# Patient Record
Sex: FEMALE | Race: WHITE | Employment: OTHER | URBAN - METROPOLITAN AREA
[De-identification: names, ages, dates, MRNs, and addresses within clinical notes are randomized per-mention and may not be internally consistent; named-entity substitution may affect disease eponyms.]

---

## 2019-09-18 ENCOUNTER — HOSPITAL ENCOUNTER (OUTPATIENT)
Dept: MAMMOGRAPHY | Age: 64
Discharge: HOME | End: 2019-09-18
Attending: NURSE PRACTITIONER
Payer: MEDICARE

## 2019-09-18 DIAGNOSIS — Z12.31 VISIT FOR SCREENING MAMMOGRAM: ICD-10-CM

## 2019-09-18 PROCEDURE — 77067 SCR MAMMO BI INCL CAD: CPT

## 2020-12-11 LAB — MAMMOGRAPHY, EXTERNAL: NORMAL

## 2021-02-18 ENCOUNTER — HOSPITAL ENCOUNTER (OUTPATIENT)
Dept: GENERAL RADIOLOGY | Age: 66
Discharge: HOME | End: 2021-02-18
Payer: MEDICARE

## 2021-02-18 DIAGNOSIS — J44.9 CHRONIC BRONCHITIS, OBSTRUCTIVE (HCC): ICD-10-CM

## 2021-02-18 PROCEDURE — 71046 X-RAY EXAM CHEST 2 VIEWS: CPT

## 2022-07-11 ENCOUNTER — OFFICE VISIT (OUTPATIENT)
Dept: ORTHOPEDIC SURGERY | Age: 67
End: 2022-07-11
Payer: MEDICARE

## 2022-07-11 VITALS — BODY MASS INDEX: 41.59 KG/M2 | HEIGHT: 62 IN | WEIGHT: 226 LBS

## 2022-07-11 DIAGNOSIS — M25.561 ACUTE PAIN OF BOTH KNEES: Primary | ICD-10-CM

## 2022-07-11 DIAGNOSIS — M17.12 PRIMARY OSTEOARTHRITIS OF LEFT KNEE: ICD-10-CM

## 2022-07-11 DIAGNOSIS — M25.562 ACUTE PAIN OF BOTH KNEES: Primary | ICD-10-CM

## 2022-07-11 DIAGNOSIS — M25.561 ACUTE PAIN OF RIGHT KNEE: ICD-10-CM

## 2022-07-11 DIAGNOSIS — M25.562 ACUTE PAIN OF LEFT KNEE: ICD-10-CM

## 2022-07-11 DIAGNOSIS — M17.11 PRIMARY OSTEOARTHRITIS OF RIGHT KNEE: ICD-10-CM

## 2022-07-11 PROCEDURE — 20610 DRAIN/INJ JOINT/BURSA W/O US: CPT | Performed by: ORTHOPAEDIC SURGERY

## 2022-07-11 PROCEDURE — 1123F ACP DISCUSS/DSCN MKR DOCD: CPT | Performed by: ORTHOPAEDIC SURGERY

## 2022-07-11 PROCEDURE — 99205 OFFICE O/P NEW HI 60 MIN: CPT | Performed by: ORTHOPAEDIC SURGERY

## 2022-07-11 PROCEDURE — G8399 PT W/DXA RESULTS DOCUMENT: HCPCS | Performed by: ORTHOPAEDIC SURGERY

## 2022-07-11 PROCEDURE — 1090F PRES/ABSN URINE INCON ASSESS: CPT | Performed by: ORTHOPAEDIC SURGERY

## 2022-07-11 PROCEDURE — G8427 DOCREV CUR MEDS BY ELIG CLIN: HCPCS | Performed by: ORTHOPAEDIC SURGERY

## 2022-07-11 PROCEDURE — G8417 CALC BMI ABV UP PARAM F/U: HCPCS | Performed by: ORTHOPAEDIC SURGERY

## 2022-07-11 PROCEDURE — 1036F TOBACCO NON-USER: CPT | Performed by: ORTHOPAEDIC SURGERY

## 2022-07-11 PROCEDURE — 3017F COLORECTAL CA SCREEN DOC REV: CPT | Performed by: ORTHOPAEDIC SURGERY

## 2022-07-11 RX ORDER — METHYLPREDNISOLONE ACETATE 40 MG/ML
40 INJECTION, SUSPENSION INTRA-ARTICULAR; INTRALESIONAL; INTRAMUSCULAR; SOFT TISSUE ONCE
Status: COMPLETED | OUTPATIENT
Start: 2022-07-11 | End: 2022-07-11

## 2022-07-11 RX ADMIN — METHYLPREDNISOLONE ACETATE 40 MG: 40 INJECTION, SUSPENSION INTRA-ARTICULAR; INTRALESIONAL; INTRAMUSCULAR; SOFT TISSUE at 09:13

## 2022-07-11 RX ADMIN — METHYLPREDNISOLONE ACETATE 40 MG: 40 INJECTION, SUSPENSION INTRA-ARTICULAR; INTRALESIONAL; INTRAMUSCULAR; SOFT TISSUE at 09:12

## 2022-07-11 NOTE — PROGRESS NOTES
Name: Landen Contreras  YOB: 1955  Gender: female  MRN: [id number]    CC: Bilateral knee pain    HPI: Landen Contreras is a 69 y.o. female who presents with longstanding bilateral knee pain left now worse than right. She states that many years ago she underwent arthroscopy of the right knee under my care and was treated with bracing which did seem to help that knee. She is been ambulating with a cane for greater than 1 year because of pain and insecurity on the left side. She has difficulty walking more than a few blocks without support. She avoids steps and has difficulty getting up and down from a seated position. She has difficulty with uneven ground. She is growing more frustrated with her increased pain on the left side and that she comes in there for further recommendations and treatment. Does have a history of a left hip fracture treated at [facility] with what sounds like a short IMH S type construct. History was obtained from patient and she has a friend with her who has undergone bilateral knee replacement surgery at a single sitting by Dr. Anthoney Leyden with the right complicated by postoperative infection requiring staged revision surgery ultimately by Dr. Demetrice Penn in [facility]. ROS/Meds/PSH/PMH/FH/SH: I personally reviewed the patients standard intake form. Below are the pertinents    Tobacco:  reports that she has never smoked.  She has never used smokeless tobacco.  Past Medical History:   Diagnosis Date    Allergic rhinitis     Anxiety     Arthritis     Chronic pain syndrome     Depression     Ear problems     Facet arthritis of lumbar region     GERD (gastroesophageal reflux disease)     Hearing reduced     Hyperlipemia     Hypertension     Menopausal disorder     Metabolic syndrome     Osteoporosis     Thickened endometrium       Past Surgical History:   Procedure Laterality Date    CARPAL TUNNEL RELEASE  03/09/2010    CHOLECYSTECTOMY      ORTHOPEDIC SURGERY Left 03/13/2014    hip fracture and sugery- fell at work    OTHER SURGICAL HISTORY  2001    sphincterotomy- Dr. Buckley Brunner        Physical Examination:  Physical exam: On examination of the patient's gait there is there is varus deformity in the bilateral knee she uses a cane properly in the right hand    On examination while standing there is decreased flexion in the lumbosacral spine without acute list or spasm. While seated ,  the Bilateral hip is examined there is full range of motion without obvious issue . On examination of the  Left knee :ROM is 0 to 125 The knee is in varus which corrects with valgus stress to some degree, There is significant tenderness to direct palpation and There is a mild effusion. On examination of the  Right knee :ROM is 0 to 125 The knee is in varus which corrects with valgus stress to some degree, There is significant tenderness to direct palpation and There is no obvious effusion. Patient is neurologically intact distally. Skin is intact. Imaging:   Radiographs: An AP standing, skiers, flexion lateral, and sunrise view of the bilateral knees were obtained  This demonstrated  Severe joint space narrowing of the medial compartment with bone-on-bone articulation and Marked osteophyte formation in all 3 compartments. Radiographic impression: severe DJD bilateral Knee    Assessment:   Degenerative joint disease of the bilateral Knee. Left more symptomatic than right with a previous left hip fracture and some history of back pain. I did discuss with the patient the source of the pain and treatment options. We discussed nonsurgical measures including:Injection therapy, Bracing, Weight Loss, Activity Modification and Use of assistive device. We also discussed the definitive option of total Knee arthroplasty. I counseled the patient regarding the nature of the procedure the preoperative preparation necessary postop recovery and expected outcome.     I counseled them regarding the risks of surgery including risk of infection, DVT formation, loss of motion, dislocation and stiffness. This patient has significant factors which may increase their surgical risk which include:, Morbid obesity with a BMI in excess of 40. This increases the risk for medical complications perioperatively including pulmonary issues, cardiovascular issues, and surgical complications including wound healing problems and increased risk for infection. and History of significant lumbar spinal issues which may complicate recovery and increased risk for persistent postoperative pain. We discussed time return to work , general activity and long-term expectations. We discussed in detail given the patient's friend's experience the complication of infection postoperatively in detail. While this is certainly always a possibility, I do not think this patient represents particular high risk for infection postoperatively. We talked about our perioperative protocols and measures that we use to prevent perioperative infection within our best abilities. I described the [address] program for the patient and expected time for hospitalization. This patient, with their level of home support, medical comorbidities, and general ambulatory function expected to have an overnight stay in the hospital prior to discharge. I would plan a Cemented Fixed Bearing Depuy Attune TKA. I would address the left knee first and the right knee at a later date per protocol. I would use Conventional Instrumentation  I discussed my implant selection process and rationale with the patient. Patient would like to consider options, if they would like to proceed with surgery they will let us know. If so, it will be a category 2 in technical complexity. This reflects my expectation for surgical time and difficulty but does not indicate the overall complexity of the patient's care.     Plan:       Follow up: Return for Surgery.      Jayden Emanuel MD      Name: Toya Moulton  YOB: 1955  Gender: female  MRN: [id number]        Current Outpatient Medications:     albuterol sulfate  (90 Base) MCG/ACT inhaler, Inhale 1 puff into the lungs every 4 hours as needed, Disp: , Rfl:     amLODIPine (NORVASC) 10 MG tablet, Take 10 mg by mouth daily, Disp: , Rfl:     aspirin 81 MG EC tablet, Take 81 mg by mouth daily, Disp: , Rfl:     carvedilol (COREG) 6.25 MG tablet, Take 6.25 mg by mouth 2 times daily (with meals), Disp: , Rfl:     Diclofenac Sodium 2 % SOLN, Apply 4 Act topically 2 times daily, Disp: , Rfl:     DULoxetine (CYMBALTA) 60 MG extended release capsule, Take 60 mg by mouth 2 times daily, Disp: , Rfl:     fenofibrate micronized (LOFIBRA) 200 MG capsule, Take 200 mg by mouth, Disp: , Rfl:     fluticasone (FLONASE) 50 MCG/ACT nasal spray, 1 to 2 sprays each nostril daily, Disp: , Rfl:     gabapentin (NEURONTIN) 100 MG capsule, Take 200 mg by mouth., Disp: , Rfl:     hydroCHLOROthiazide (HYDRODIURIL) 25 MG tablet, Take 25 mg by mouth daily, Disp: , Rfl:     Magnesium Oxide 500 MG TABS, Take by mouth, Disp: , Rfl:     meloxicam (MOBIC) 7.5 MG tablet, Take 7.5 mg by mouth daily, Disp: , Rfl:     naloxone 4 MG/0.1ML LIQD nasal spray, 1 spray by Nasal route once, Disp: , Rfl:     potassium chloride (KLOR-CON) 10 MEQ extended release tablet, Take 10 mEq by mouth 2 times daily, Disp: , Rfl:     QUEtiapine (SEROQUEL) 25 MG tablet, Take 25 mg by mouth, Disp: , Rfl:     rosuvastatin (CRESTOR) 20 MG tablet, Take 20 mg by mouth, Disp: , Rfl:     valsartan (DIOVAN) 320 MG tablet, Take 320 mg by mouth daily, Disp: , Rfl:   Allergies   Allergen Reactions    Benazepril Other (See Comments)    Erythromycin Other (See Comments)     Other reaction(s): Abdominal pain-I  Other reaction(s): Abdominal pain-I  GI upset       CC:bilateral knee pain    Impression: Osteoarthritis of the bilateral knee    Procedure: Depomedrol injection     Procedure Note: The bilateral knee was prepped with alcohol. Then the bilateral knee was injected with 1 mL of 0.5% Marcaine and 40mg of Depo-Medrol. The patient tolerated the procedure without difficulty.       Frank Duval MD

## 2022-07-28 DIAGNOSIS — M17.12 PRIMARY OSTEOARTHRITIS OF LEFT KNEE: Primary | ICD-10-CM

## 2022-08-23 ENCOUNTER — HOSPITAL ENCOUNTER (OUTPATIENT)
Dept: REHABILITATION | Age: 67
Discharge: HOME | End: 2022-08-26
Payer: MEDICARE

## 2022-08-23 ENCOUNTER — HOSPITAL ENCOUNTER (OUTPATIENT)
Dept: SURGERY | Age: 67
Discharge: HOME | End: 2022-08-26
Payer: MEDICARE

## 2022-08-23 DIAGNOSIS — Z96.652 STATUS POST LEFT KNEE REPLACEMENT: Primary | ICD-10-CM

## 2022-08-23 DIAGNOSIS — R29.818 SUSPECTED SLEEP APNEA: ICD-10-CM

## 2022-08-23 LAB
ANION GAP SERPL CALC-SCNC: 4 MMOL/L (ref 7–16)
APTT PPP: 26.7 SEC (ref 24.1–35.1)
BACTERIA SPEC CULT: NORMAL
BASOPHILS # BLD: 0 K/UL (ref 0–0.2)
BASOPHILS NFR BLD: 0 % (ref 0–2)
BUN SERPL-MCNC: 15 MG/DL (ref 8–23)
CALCIUM SERPL-MCNC: 9.2 MG/DL (ref 8.3–10.4)
CHLORIDE SERPL-SCNC: 106 MMOL/L (ref 98–107)
CO2 SERPL-SCNC: 29 MMOL/L (ref 21–32)
CREAT SERPL-MCNC: 0.87 MG/DL (ref 0.6–1)
DIFFERENTIAL METHOD BLD: ABNORMAL
EKG ATRIAL RATE: 79 BPM
EKG DIAGNOSIS: NORMAL
EKG P AXIS: 52 DEGREES
EKG P-R INTERVAL: 166 MS
EKG Q-T INTERVAL: 388 MS
EKG QRS DURATION: 116 MS
EKG QTC CALCULATION (BAZETT): 444 MS
EKG R AXIS: 76 DEGREES
EKG T AXIS: -24 DEGREES
EKG VENTRICULAR RATE: 79 BPM
EOSINOPHIL # BLD: 0.5 K/UL (ref 0–0.8)
EOSINOPHIL NFR BLD: 5 % (ref 0.5–7.8)
ERYTHROCYTE [DISTWIDTH] IN BLOOD BY AUTOMATED COUNT: 14.5 % (ref 11.9–14.6)
GLUCOSE SERPL-MCNC: 171 MG/DL (ref 65–100)
HCT VFR BLD AUTO: 41.8 % (ref 35.8–46.3)
HGB BLD-MCNC: 13.5 G/DL (ref 11.7–15.4)
IMM GRANULOCYTES # BLD AUTO: 0 K/UL (ref 0–0.5)
IMM GRANULOCYTES NFR BLD AUTO: 0 % (ref 0–5)
INR PPP: 0.9
LYMPHOCYTES # BLD: 1 K/UL (ref 0.5–4.6)
LYMPHOCYTES NFR BLD: 11 % (ref 13–44)
MCH RBC QN AUTO: 29.4 PG (ref 26.1–32.9)
MCHC RBC AUTO-ENTMCNC: 32.3 G/DL (ref 31.4–35)
MCV RBC AUTO: 91.1 FL (ref 79.6–97.8)
MONOCYTES # BLD: 0.9 K/UL (ref 0.1–1.3)
MONOCYTES NFR BLD: 10 % (ref 4–12)
NEUTS SEG # BLD: 6.6 K/UL (ref 1.7–8.2)
NEUTS SEG NFR BLD: 73 % (ref 43–78)
NRBC # BLD: 0 K/UL (ref 0–0.2)
PLATELET # BLD AUTO: 312 K/UL (ref 150–450)
PMV BLD AUTO: 10.5 FL (ref 9.4–12.3)
POTASSIUM SERPL-SCNC: 3.2 MMOL/L (ref 3.5–5.1)
PROTHROMBIN TIME: 12.3 SEC (ref 12.6–14.5)
RBC # BLD AUTO: 4.59 M/UL (ref 4.05–5.2)
SERVICE CMNT-IMP: NORMAL
SODIUM SERPL-SCNC: 139 MMOL/L (ref 136–145)
WBC # BLD AUTO: 9 K/UL (ref 4.3–11.1)

## 2022-08-23 PROCEDURE — 85730 THROMBOPLASTIN TIME PARTIAL: CPT

## 2022-08-23 PROCEDURE — 87641 MR-STAPH DNA AMP PROBE: CPT

## 2022-08-23 PROCEDURE — 80048 BASIC METABOLIC PNL TOTAL CA: CPT

## 2022-08-23 PROCEDURE — 85610 PROTHROMBIN TIME: CPT

## 2022-08-23 PROCEDURE — 85025 COMPLETE CBC W/AUTO DIFF WBC: CPT

## 2022-08-23 PROCEDURE — 93005 ELECTROCARDIOGRAM TRACING: CPT | Performed by: ANESTHESIOLOGY

## 2022-08-23 PROCEDURE — 97161 PT EVAL LOW COMPLEX 20 MIN: CPT

## 2022-08-23 RX ORDER — LORAZEPAM 1 MG/1
1 TABLET ORAL 2 TIMES DAILY PRN
COMMUNITY

## 2022-08-23 RX ORDER — TRAMADOL HYDROCHLORIDE 50 MG/1
50 TABLET ORAL EVERY 6 HOURS PRN
Status: ON HOLD | COMMUNITY
End: 2022-09-07 | Stop reason: HOSPADM

## 2022-08-23 ASSESSMENT — KOOS JR
RISING FROM SITTING: 1
KOOS JR TOTAL INTERVAL SCORE: 54.84
STANDING UPRIGHT: 1
TWISING OR PIVOTING ON KNEE: 1
BENDING TO THE FLOOR TO PICK UP OBJECT: 2
GOING UP OR DOWN STAIRS: 3
STRAIGHTENING KNEE FULLY: 2
HOW SEVERE IS YOUR KNEE STIFFNESS AFTER FIRST WAKING IN MORNING: 3

## 2022-08-23 ASSESSMENT — PAIN SCALES - GENERAL: PAINLEVEL_OUTOF10: 6

## 2022-08-23 NOTE — PERIOP NOTE
Patient verified name and . Order for consent NOT found in EHR; patient verified. Type 3 surgery, joint assessment complete. Labs per surgeon: No orders received. Labs per anesthesia protocol: cbc, bmp, pt/ptt, hgba1c; results pending. T&S DOS; order signed and held in EHR. EKG: Completed today; results to be reviewed by anesthesia. Charge nurse to f/u. Pt was seen by pulmonary on 21 with c/o SOB. Per note pt has restrictive lung disease and a 6 month f/u was recommended. Pt never followed up. Anesthesia to review the above-charge nurse to f/u. MRSA/MSSA swab collected; pharmacy to review and dose antibiotic as appropriate. Hospital approved surgical skin cleanser and instructions to return bottle on DOS given per hospital policy. Patient provided with handouts including Guide to Surgery, Pain Management, Hand Hygiene, Blood Transfusion Education, and [facility] Brochure. Patient answered medical/surgical history questions at their best of ability. All prior to admission medications documented in [facility]. Original medication prescription bottle NOT visualized during patient appointment. Patient instructed to hold all vitamins, supplements, herbals 3 weeks prior to surgery and NSAIDS 5 days prior to surgery. Patient teach back successful and patient demonstrates knowledge of instruction.

## 2022-08-23 NOTE — PROGRESS NOTES
Waldo Goodrich  : 1955  Primary: [facility]  Secondary:  [facility] at [facility]  [address]  [phone]        Physical Therapy Prehab Evaluation Summary:2022   Time In/Out   PT Charge Capture  Episode     MEDICAL/REFERRING DIAGNOSIS: Unilateral primary osteoarthritis, left knee [M17.12]  REFERRING PHYSICIAN: Sophie Maloney MD    ICD-10: Treatment Diagnosis:   Pain in Left Knee (M25.562)  Stiffness of Left Knee, Not elsewhere classified (M25.662)  Difficulty in walking, Not elsewhere classified (R26.2)    DATE OF SURGERY: 22  Assessment:   COMMENTS:  Patient presents prior to L TKA. She needs a R TKA as well. Patient plans on spending the night. She will go home with help from her sister. PROBLEM LIST:   (Impacting functional limitations):  Ms. Alvin Campa presents with the following lower extremity(s) problems:  Transfers  Gait  Strength  Range of Motion  Home Exercise Program  Pain INTERVENTIONS PLANNED:   (Benefits and precautions of physical therapy have been discussed with the patient.)  Home Exercise Program  Educational Discussion       GOALS: (Goals have been discussed and agreed upon with patient.)  Discharge Goals: Time Frame: 1 Day  Patient will demonstrate independence with a home exercise program designed to increase strength, range of motion, balance, coordination, functional technique, and pain control to minimize functional deficits and optimize patient for total joint replacement.     Subjective:   Past Medical History/Comorbidities:   Ms. Alvin Campa  has a past medical history of Allergic rhinitis, Anxiety, Arthritis, Autoimmune hepatitis ([address] Chronic pain syndrome, Depression, Ear problems, Elevated liver enzymes, Facet arthritis of lumbar region, GERD (gastroesophageal reflux disease), Hearing reduced, Hyperlipemia, Hypertension, Iron deficiency anemia, Menopausal disorder, Metabolic syndrome, Osteoporosis, Restrictive lung disease, and Thickened endometrium. Ms. Kimberley Garrett  has a past surgical history that includes orthopedic surgery (Left, 03/13/2014); Cholecystectomy; other surgical history (2001); and ERCP (05/2021).     Allergies:  Benazepril and Erythromycin    Current Medications:  Refer to pre-assessment nursing note    Home Set-Up/Prior Level of Function:  Lives With: Family  Type of Home: House  Home Layout: One level  Home Access: Stairs to enter with rails  Entrance Stairs - Number of Steps: 1  Entrance Stairs - Rails: Left  Bathroom Shower/Tub: Walk-in shower  Bathroom Equipment: Grab bars in shower, Built-in shower seat  Home Equipment: Cane  Ambulation Assistance: Independent  Occupation: Retired    Dominant Side:  No data recorded    Current Functional Status:   Ambulation:  [] Independent  [] [address] Only  [] Walk Outdoors  [x] Use Assistive Device  [] Use Wheelchair Only Dressing:  [x] [email or web address] from Someone for:  [] Sock/Shoes  [] Pants  [] Everything   Bathing/Showering:   [x] Independent  [] [address] from Someone  [] [address] Only Household Activities:  [x] Routine house and yard work  [] Light Housework Only  [] None     Objective:   PAIN:   Pre-Treatment Pain  Pain Assessment: 0-10  Pain Level: 6    Post Treatment: 6    Outcome Measure:   HOOS-JR:       KOOS-JR:  KOOS, Jr. Knee Survey Score: 13    LOWER EXTREMITY GROSS EVALUATION:  RIGHT LE   Within Functional Limits   Abnormal   Comments   Strength [] [] Strength RLE  R Hip Flexion: 4+/5  R Knee Flexion: 4+/5  R Knee Extension: 4+/5   Range of Motion [] []        LEFT LE Within Functional Limits   Abnormal   Comments   Strength [] [] Strength LLE  L Hip Flexion: 4+/5  L Knee Flexion: 4+/5  L Knee Extension: 4+/5   Range of Motion [] [] AROM LLE (degrees)  L Knee Flexion 0-145: 109  L Knee Extension 0: 0     UPPER EXTREMITY GROSS EVALUATION:     Within Functional Limits   Abnormal   Comments Strength [x] []    Range of Motion [x] []      SENSATION  Sensation []       COGNITION/  PERCEPTION: Intact Impaired (Comments):   Orientation [x]     Vision [x]     Hearing [x]     Cognition  [x]       TRANSFERS: I Mod I S SBA CGA Min Mod Max Total  NT x2 Comments:   Sit to Stand [x] [] [] [] [] [] [] [] [] [] []    Stand to Sit [x] [] [] [] [] [] [] [] [] [] []    Stand pivot [] [] [] [] [] [] [] [] [] [] []     [] [] [] [] [] [] [] [] [] [] []    I=Independent, Mod I=Modified Independent, S=Supervision, SBA=Standby Assistance, CGA=Contact Guard Assistance,   Min=Minimal Assistance, Mod=Moderate Assistance, Max=Maximal Assistance, Total=Total Assistance, NT=Not Tested    BALANCE: Good Fair+ Fair Fair- Poor NT Comments   Sitting Static [x] [] [] [] [] []    Sitting Dynamic [x] [] [] [] [] []              Standing Static [x] [] [] [] [] []    Standing Dynamic [] [x] [] [] [] []      Postural Assessment:   Genu Varus Right  Genu Varus Left    GAIT: I Mod I S SBA CGA Min Mod Max Total  NT x2 Comments:   Level of Assistance [] [x] [] [] [] [] [] [] [] [] []    Weightbearing Status       Distance  200 feet    Gait Quality Antalgic and Trunk sway increased    DME SPC     Stairs      Ramp     I=Independent, Mod I=Modified Independent, S=Supervision, SBA=Standby Assistance, CGA=Contact Guard Assistance,   Min=Minimal Assistance, Mod=Moderate Assistance, Max=Maximal Assistance, Total=Total Assistance, NT=Not Tested    TREATMENT:   EVALUATION: LOW COMPLEXITY: (Untimed Charge)    TREATMENT PLAN:   Effective Dates: 8/23/2022 TO 8/23/2022. Treatment/Session Assessment:  Patient was instructed in PT- HEP to increase strength and ROM in LEs. Answered all questions. Frequency/Duration: Patient to continue to perform home exercise program at least twice per day up until her surgery.     EDUCATION: Education Given To: Patient  Education Provided: Role of Therapy, Plan of Care, Home Exercise Program  Education Outcome: Verbalized understanding    MEDICAL NECESSITY: Ms. Fawad Shen is expected to optimize herlower extremity strength and ROM in preparation for joint replacement surgery. REASON FOR CONTINUED SERVICES: Achieve baseline assesment of musculoskeletal system, functional mobility and home environment. , educate in PT HEP in preparation for surgery, educate in hospital plan of care. COMPLIANCE WITH PROGRAM/EXERCISE: Will assess as treatment progresses. TOTAL TREATMENT DURATION:  Time In: 1015  Time Out: 56  Minutes: 15    Regarding Anjana Luke's therapy, I certify that the treatment plan above will be carried out by a therapist or under their direction.   Thank you for this referral,  Pia Abdi, PT

## 2022-08-23 NOTE — PERIOP NOTE
Glucose results to be reviewed by anesthesia. All other lab results listed below are within anesthesia protocol.       Latest Reference Range & Units 8/23/22 09:13   Sodium 136 - 145 mmol/L 139   Potassium 3.5 - 5.1 mmol/L 3.2 (L)   Chloride 98 - 107 mmol/L 106   CO2 21 - 32 mmol/L 29   BUN,BUNPL 8 - 23 MG/DL 15   Creatinine 0.6 - 1.0 MG/DL 0.87   Anion Gap 7 - 16 mmol/L 4 (L)   GFR Non-African American >60 ml/min/1.73m2 >60   GFR  >60 ml/min/1.73m2 >60   Glucose, Random 65 - 100 mg/dL 171 (H)   CALCIUM, SERUM, 234615 8.3 - 10.4 MG/DL 9.2   WBC 4.3 - 11.1 K/uL 9.0   RBC 4.05 - 5.2 M/uL 4.59   Hemoglobin Quant 11.7 - 15.4 g/dL 13.5   Hematocrit 35.8 - 46.3 % 41.8   MCV 79.6 - 97.8 FL 91.1   MCH 26.1 - 32.9 PG 29.4   MCHC 31.4 - 35.0 g/dL 32.3   MPV 9.4 - 12.3 FL 10.5   RDW 11.9 - 14.6 % 14.5   Platelet Count 065 - 450 K/uL 312   Absolute Mono # 0.1 - 1.3 K/UL 0.9   Eosinophils % 0.5 - 7.8 % 5   Basophils Absolute 0.0 - 0.2 K/UL 0.0   Differential Type -   AUTOMATED   Seg Neutrophils 43 - 78 % 73   Segs Absolute 1.7 - 8.2 K/UL 6.6   Lymphocytes 13 - 44 % 11 (L)   Absolute Lymph # 0.5 - 4.6 K/UL 1.0   Monocytes 4.0 - 12.0 % 10   Absolute Eos # 0.0 - 0.8 K/UL 0.5   Basophils 0.0 - 2.0 % 0   Immature Granulocytes 0.0 - 5.0 % 0   Nucleated Red Blood Cells 0.0 - 0.2 K/uL 0.00   Absolute Immature Granulocyte 0.0 - 0.5 K/UL 0.0   Prothrombin Time 12.6 - 14.5 sec 12.3 (L)   INR -   0.9   PTT 24.1 - 35.1 SEC 26.7   MSSA/MRSA SCREEN BY PCR  Rpt   (L): Data is abnormally low  (H): Data is abnormally high  Rpt: View report in Results Review for more information

## 2022-08-23 NOTE — CARE COORDINATION
Joint [facility] Case Management note:  Patient screened in Prehab for discharge planning needs. Patient scheduled for a future total joint replacement. We discussed Home Health and equipment needed after surgery. List of Home Health providers offered. Patient w/o preference towards provider. We will arrange [facility] on the day of surgery.   Will need a walker and 3-1 BSC

## 2022-08-23 NOTE — PERIOP NOTE
PLEASE CONTINUE TAKING ALL PRESCRIPTION MEDICATIONS UP TO THE DAY OF SURGERY UNLESS OTHERWISE DIRECTED BELOW. DISCONTINUE all vitamins, herbals and supplements 21 days prior to surgery. DISCONTINUE Non-Steriodal Anti-Inflammatory (NSAIDS) such as Advil, Ibuprofen, and Aleve 5 days prior to surgery. Home Medications to HOLD       All vitamins, supplements, and herbals stop today. *Please continue prescribed potassium up until the day of surgery*       All NSAIDs such as Meloxicam, Advil, Aleve, Ibuprofen, Diclofenac, Naproxen, etc. And Aspirin (aspirin products) stop 5 days prior to surgery. Home Medications to take  the day of surgery   Tramadol if needed, Ativan if needed, Albuterol inhaler if needed, Cymbalta, Rosuvastatin, Carvedilol, Amlodipine, Flonase if needed,           Comments      BRING:inhaler,  incentive spirometer             Please do not bring home medications with you on the day of surgery unless otherwise directed by your nurse. If you are instructed to bring home medications, please give them to your nurse as they will be administered by the nursing staff. If you have any questions, please call [facility] Melissa De Amaya [phone] or [address] [place] [address] [phone].

## 2022-08-24 VITALS
WEIGHT: 223 LBS | OXYGEN SATURATION: 96 % | SYSTOLIC BLOOD PRESSURE: 170 MMHG | BODY MASS INDEX: 39.51 KG/M2 | HEART RATE: 81 BPM | RESPIRATION RATE: 18 BRPM | DIASTOLIC BLOOD PRESSURE: 72 MMHG | TEMPERATURE: 97.6 F | HEIGHT: 63 IN

## 2022-08-24 PROBLEM — R29.818 SUSPECTED SLEEP APNEA: Status: ACTIVE | Noted: 2022-08-24

## 2022-08-24 PROCEDURE — 94664 DEMO&/EVAL PT USE INHALER: CPT

## 2022-08-24 PROCEDURE — 94760 N-INVAS EAR/PLS OXIMETRY 1: CPT

## 2022-08-24 ASSESSMENT — PULMONARY FUNCTION TESTS
FEV1 (%PREDICTED): 55
FEV1 (LITERS): 1.13

## 2022-08-24 NOTE — PROGRESS NOTES
Initial respiratory Assessment completed with pt. Pt was interviewed and evaluated in Joint camp prior to surgery. Patient ID:  Ricki Allen  [id number]  86 y.o.  1955  Surgeon: DR Cayden Garcia  Date of Surgery: [email or web address]  Procedure: Total Left Knee Arthroplasty  Primary Care Physician: Aundrea Bautista -[phone]  Specialists: [facility]    BS:DIMINISHED      Pt taught proper COUGH technique  IS REVIEWED WITH PT AS WELL AS BENEFITS OF USING IS IN SEDENTARY PTS. DIAPHRAGMATIC BREATHING EXERCISE INSTRUCTIONS GIVEN    History of smoking:   DENIES                 Quit date:         Secondhand smoke:PARENTS    Past procedures with Oxygen desaturation or delayed awakening:DENIES    Past Medical History:   Diagnosis Date    Allergic rhinitis     Anxiety     Arthritis     Autoimmune hepatitis ([address]     Chronic pain syndrome     Depression     Ear problems     Elevated liver enzymes     resolved per pt    Facet arthritis of lumbar region     GERD (gastroesophageal reflux disease)     Hearing reduced     Hyperlipemia     managed with medication    Hypertension     managed with medication    Iron deficiency anemia     hx    Menopausal disorder     Metabolic syndrome     Osteoporosis     Restrictive lung disease     PRN inhaler-has not had to use    Thickened endometrium     HX OF COVID JAN 2021- MILD  PNA April 2021, MILD RESTRICTIVE LUNG DISEASE  FREQUENT BRONCHITIS, HX OF WHEEZING  Respiratory history:                                 SOB  ON EXERTION                                    Respiratory meds:  PT uses MDI PRN with PROAIR. MDI instructions given verbally & written along with spacer.   Pt to use home MDI's morning of surgery & bring to [address]:             NO     PAST SLEEP STUDY:                         DENIES  HX OF ANTON:                                      DENIES  ANTON assessment:     DANGERS OF UNTREATED ANTON EXPLAINED TO PT. SLEEPS ON SIDE        PHYSICAL EXAM   Body mass index is 40.14 kg/m². Vitals:    08/24/22 1039   BP:    Pulse: (P) 81   Resp:    Temp:    SpO2: (P) 96%     Neck circumference:   49   cm    Loud snoring:                                                 YES            Witnessed apnea or wakening gasping or choking:        DENIES         Awakens with headaches:                                               DENIES  Morning or daytime tiredness/ sleepiness:                               TIRED  Dry mouth or sore throat in morning:                     DENIES                                   Del Valle stage:  4                                   SACS score:69  Stop Bang 6                                       CS HS  RESPIRATORY ASSESSMENT Q SHIFT   O2 PRN    ALBUTEROL  NEBULIZER Q6 PRN WHEEZING                                       Referrals:  [facility]  Pt.  Phone Number:

## 2022-08-24 NOTE — PROGRESS NOTES
Reconciliation     Allergies   Allergen Reactions    Benazepril Other (See Comments)    Erythromycin Other (See Comments)     Other reaction(s): Abdominal pain-I  Other reaction(s): Abdominal pain-I  GI upset          Objective:     Physical Exam:   No data found. ECG:    No results found for this or any previous visit (from the past 4464 hour(s)). Data Review:   Labs:   Labs reviewed    Problem List:  )  Patient Active Problem List   Diagnosis    Hypertension    Recurrent major depressive disorder, in partial remission (HCC)    Arthralgia of hip    Osteoporosis    Hyperlipemia    Unilateral primary osteoarthritis, left knee    Suspected sleep apnea       Total Joint Surgery Pre-Assessment Recommendations:           Patient reports the symptoms of snoring, fatigue, observed apnea and /or excessive daytime sleepiness. Will refer patient for HST based on above assessment. Recommend continuous saturation monitoring hours of sleep, during hospitalization.     Albuterol every 6 hours as need during hospitalization. ;    Signed By: FLY Calix - CNP-C    August 24, 2022

## 2022-08-24 NOTE — PROGRESS NOTES
Pt's symptoms include:    Snoring  Tiredness- excessive daytime sleepiness  HTN  GERD  Neck size        49      cm  Modified Del Valle stage 4  SACS Score 68  STOP BANG 6  Height    5  '   3 \"   Weight  223   lbs  BMI 40.14    Sleepiness Scale:     Sitting and reading 2    Watching TV 3    Sitting inactive in a public place 2    As a passenger in a car for an hour without a break 3    Lying down to rest in the afternoon when circumstances Permits 3    Sitting and talking to someone 1    Sitting quietly after lunch without alcohol 3    In a car, while stopped for a few minutes 0    Total :   15    Refer patient for sleep study based on above assessment.   [facility]  Phone number:  [phone]

## 2022-09-02 ENCOUNTER — OFFICE VISIT (OUTPATIENT)
Dept: ORTHOPEDIC SURGERY | Age: 67
End: 2022-09-02

## 2022-09-02 DIAGNOSIS — M17.12 PRIMARY OSTEOARTHRITIS OF LEFT KNEE: Primary | ICD-10-CM

## 2022-09-02 PROCEDURE — PREOPEXAM PRE-OP EXAM: Performed by: ORTHOPAEDIC SURGERY

## 2022-09-02 NOTE — H&P (VIEW-ONLY)
Name: Allison Bateman  YOB: 1955  Gender: female  MRN: [id number]    Pre-Op     CC: LEFT KNEE PAIN       This patient comes in for pre-op exam prior to LEFT TKA. The patient has been cleared preoperatively. I counseled the patient once again about the risks of infection, DVT formation, expected time of hospitalization, anticipated recovery time as well as rehab needs and expectations for recovery. The patient would like to proceed and we will do so as planned. The patient was provided with pain medications as well as DVT prophylaxis to have on hand postoperatively at the time of discharge from hospital. All pertinent questions asked by the patient were answered.     JENNIFER Burnham

## 2022-09-02 NOTE — PROGRESS NOTES
Name: Jd Whipple  YOB: 1955  Gender: female  MRN: [id number]    Pre-Op     CC: LEFT KNEE PAIN       This patient comes in for pre-op exam prior to LEFT TKA. The patient has been cleared preoperatively. I counseled the patient once again about the risks of infection, DVT formation, expected time of hospitalization, anticipated recovery time as well as rehab needs and expectations for recovery. The patient would like to proceed and we will do so as planned. The patient was provided with pain medications as well as DVT prophylaxis to have on hand postoperatively at the time of discharge from hospital. All pertinent questions asked by the patient were answered.     JENNIFER Pepper

## 2022-09-05 ENCOUNTER — PREP FOR PROCEDURE (OUTPATIENT)
Dept: ORTHOPEDIC SURGERY | Age: 67
End: 2022-09-05

## 2022-09-05 DIAGNOSIS — M17.12 PRIMARY OSTEOARTHRITIS OF LEFT KNEE: Primary | ICD-10-CM

## 2022-09-05 RX ORDER — ACETAMINOPHEN 325 MG/1
1000 TABLET ORAL ONCE
Status: CANCELLED | OUTPATIENT
Start: 2022-09-05 | End: 2022-09-05

## 2022-09-06 ENCOUNTER — HOME HEALTH ADMISSION (OUTPATIENT)
Dept: HOME HEALTH SERVICES | Facility: HOME HEALTH | Age: 67
End: 2022-09-06
Payer: MEDICARE

## 2022-09-06 ENCOUNTER — HOSPITAL ENCOUNTER (INPATIENT)
Age: 67
LOS: 1 days | Discharge: HOME HEALTH | DRG: 470 | End: 2022-09-07
Attending: ORTHOPAEDIC SURGERY | Admitting: ORTHOPAEDIC SURGERY
Payer: MEDICARE

## 2022-09-06 ENCOUNTER — ANESTHESIA EVENT (OUTPATIENT)
Dept: SURGERY | Age: 67
DRG: 470 | End: 2022-09-06
Payer: MEDICARE

## 2022-09-06 ENCOUNTER — ANESTHESIA (OUTPATIENT)
Dept: SURGERY | Age: 67
DRG: 470 | End: 2022-09-06
Payer: MEDICARE

## 2022-09-06 DIAGNOSIS — Z96.652 TOTAL KNEE REPLACEMENT STATUS, LEFT: Primary | ICD-10-CM

## 2022-09-06 PROBLEM — M17.12 ARTHRITIS OF LEFT KNEE: Status: ACTIVE | Noted: 2022-09-06

## 2022-09-06 PROCEDURE — 6370000000 HC RX 637 (ALT 250 FOR IP): Performed by: ORTHOPAEDIC SURGERY

## 2022-09-06 PROCEDURE — 6360000002 HC RX W HCPCS: Performed by: PHYSICIAN ASSISTANT

## 2022-09-06 PROCEDURE — 2720000010 HC SURG SUPPLY STERILE: Performed by: ORTHOPAEDIC SURGERY

## 2022-09-06 PROCEDURE — 2500000003 HC RX 250 WO HCPCS: Performed by: ORTHOPAEDIC SURGERY

## 2022-09-06 PROCEDURE — 97165 OT EVAL LOW COMPLEX 30 MIN: CPT

## 2022-09-06 PROCEDURE — 97535 SELF CARE MNGMENT TRAINING: CPT

## 2022-09-06 PROCEDURE — 97110 THERAPEUTIC EXERCISES: CPT

## 2022-09-06 PROCEDURE — C1776 JOINT DEVICE (IMPLANTABLE): HCPCS | Performed by: ORTHOPAEDIC SURGERY

## 2022-09-06 PROCEDURE — 97530 THERAPEUTIC ACTIVITIES: CPT

## 2022-09-06 PROCEDURE — 0SRD0J9 REPLACEMENT OF LEFT KNEE JOINT WITH SYNTHETIC SUBSTITUTE, CEMENTED, OPEN APPROACH: ICD-10-PCS | Performed by: ORTHOPAEDIC SURGERY

## 2022-09-06 PROCEDURE — 8E0Y0CZ ROBOTIC ASSISTED PROCEDURE OF LOWER EXTREMITY, OPEN APPROACH: ICD-10-PCS | Performed by: ORTHOPAEDIC SURGERY

## 2022-09-06 PROCEDURE — 2500000003 HC RX 250 WO HCPCS: Performed by: PHYSICIAN ASSISTANT

## 2022-09-06 PROCEDURE — 27447 TOTAL KNEE ARTHROPLASTY: CPT | Performed by: PHYSICIAN ASSISTANT

## 2022-09-06 PROCEDURE — 6370000000 HC RX 637 (ALT 250 FOR IP): Performed by: STUDENT IN AN ORGANIZED HEALTH CARE EDUCATION/TRAINING PROGRAM

## 2022-09-06 PROCEDURE — 6360000002 HC RX W HCPCS: Performed by: REGISTERED NURSE

## 2022-09-06 PROCEDURE — 2580000003 HC RX 258: Performed by: STUDENT IN AN ORGANIZED HEALTH CARE EDUCATION/TRAINING PROGRAM

## 2022-09-06 PROCEDURE — 6360000002 HC RX W HCPCS: Performed by: STUDENT IN AN ORGANIZED HEALTH CARE EDUCATION/TRAINING PROGRAM

## 2022-09-06 PROCEDURE — 7100000000 HC PACU RECOVERY - FIRST 15 MIN: Performed by: ORTHOPAEDIC SURGERY

## 2022-09-06 PROCEDURE — 3600000015 HC SURGERY LEVEL 5 ADDTL 15MIN: Performed by: ORTHOPAEDIC SURGERY

## 2022-09-06 PROCEDURE — 1100000000 HC RM PRIVATE

## 2022-09-06 PROCEDURE — C1713 ANCHOR/SCREW BN/BN,TIS/BN: HCPCS | Performed by: ORTHOPAEDIC SURGERY

## 2022-09-06 PROCEDURE — 2580000003 HC RX 258: Performed by: ORTHOPAEDIC SURGERY

## 2022-09-06 PROCEDURE — 2500000003 HC RX 250 WO HCPCS: Performed by: REGISTERED NURSE

## 2022-09-06 PROCEDURE — 3700000000 HC ANESTHESIA ATTENDED CARE: Performed by: ORTHOPAEDIC SURGERY

## 2022-09-06 PROCEDURE — 2700000000 HC OXYGEN THERAPY PER DAY

## 2022-09-06 PROCEDURE — 7100000001 HC PACU RECOVERY - ADDTL 15 MIN: Performed by: ORTHOPAEDIC SURGERY

## 2022-09-06 PROCEDURE — 27447 TOTAL KNEE ARTHROPLASTY: CPT | Performed by: ORTHOPAEDIC SURGERY

## 2022-09-06 PROCEDURE — 6370000000 HC RX 637 (ALT 250 FOR IP): Performed by: PHYSICIAN ASSISTANT

## 2022-09-06 PROCEDURE — 3600000005 HC SURGERY LEVEL 5 BASE: Performed by: ORTHOPAEDIC SURGERY

## 2022-09-06 PROCEDURE — 3700000001 HC ADD 15 MINUTES (ANESTHESIA): Performed by: ORTHOPAEDIC SURGERY

## 2022-09-06 PROCEDURE — 97161 PT EVAL LOW COMPLEX 20 MIN: CPT

## 2022-09-06 PROCEDURE — 64447 NJX AA&/STRD FEMORAL NRV IMG: CPT | Performed by: STUDENT IN AN ORGANIZED HEALTH CARE EDUCATION/TRAINING PROGRAM

## 2022-09-06 PROCEDURE — 2709999900 HC NON-CHARGEABLE SUPPLY: Performed by: ORTHOPAEDIC SURGERY

## 2022-09-06 PROCEDURE — 94760 N-INVAS EAR/PLS OXIMETRY 1: CPT

## 2022-09-06 PROCEDURE — 6360000002 HC RX W HCPCS: Performed by: ORTHOPAEDIC SURGERY

## 2022-09-06 DEVICE — ATTUNE KNEE SYSTEM TIBIAL INSERT FIXED BEARING POSTERIOR STABILIZED 5 10MM AOX
Type: IMPLANTABLE DEVICE | Site: KNEE | Status: FUNCTIONAL
Brand: ATTUNE

## 2022-09-06 DEVICE — ATTUNE KNEE SYSTEM FEMORAL POSTERIOR STABILIZED SIZE 5 LEFT CEMENTED
Type: IMPLANTABLE DEVICE | Site: KNEE | Status: FUNCTIONAL
Brand: ATTUNE

## 2022-09-06 DEVICE — ATTUNE KNEE SYSTEM REVISION FIXED BEARING TIBIAL BASE CEMENTED SIZE 5
Type: IMPLANTABLE DEVICE | Site: KNEE | Status: FUNCTIONAL
Brand: ATTUNE

## 2022-09-06 DEVICE — CEMENT BONE 40GM HI VISC PALACOS R: Type: IMPLANTABLE DEVICE | Site: KNEE | Status: FUNCTIONAL

## 2022-09-06 DEVICE — KNEE K1 TOT HEMI STD CEM IMPL CAPPED SYNTHES: Type: IMPLANTABLE DEVICE | Status: FUNCTIONAL

## 2022-09-06 DEVICE — ATTUNE KNEE SYSTEM REVISION CEMENTED STEM CEMENTED 14X50MM
Type: IMPLANTABLE DEVICE | Site: KNEE | Status: FUNCTIONAL
Brand: ATTUNE

## 2022-09-06 RX ORDER — ACETAMINOPHEN 500 MG
1000 TABLET ORAL ONCE
Status: DISCONTINUED | OUTPATIENT
Start: 2022-09-06 | End: 2022-09-06 | Stop reason: SDUPTHER

## 2022-09-06 RX ORDER — SODIUM CHLORIDE 9 MG/ML
INJECTION, SOLUTION INTRAVENOUS PRN
Status: DISCONTINUED | OUTPATIENT
Start: 2022-09-06 | End: 2022-09-07 | Stop reason: HOSPADM

## 2022-09-06 RX ORDER — SODIUM CHLORIDE 9 MG/ML
INJECTION, SOLUTION INTRAVENOUS PRN
Status: DISCONTINUED | OUTPATIENT
Start: 2022-09-06 | End: 2022-09-06 | Stop reason: HOSPADM

## 2022-09-06 RX ORDER — DULOXETIN HYDROCHLORIDE 60 MG/1
60 CAPSULE, DELAYED RELEASE ORAL 2 TIMES DAILY
Status: DISCONTINUED | OUTPATIENT
Start: 2022-09-06 | End: 2022-09-07 | Stop reason: HOSPADM

## 2022-09-06 RX ORDER — OXYCODONE HYDROCHLORIDE 5 MG/1
10 TABLET ORAL PRN
Status: COMPLETED | OUTPATIENT
Start: 2022-09-06 | End: 2022-09-06

## 2022-09-06 RX ORDER — HYDROCHLOROTHIAZIDE 25 MG/1
25 TABLET ORAL DAILY
Status: DISCONTINUED | OUTPATIENT
Start: 2022-09-07 | End: 2022-09-07 | Stop reason: HOSPADM

## 2022-09-06 RX ORDER — LABETALOL HYDROCHLORIDE 5 MG/ML
10 INJECTION, SOLUTION INTRAVENOUS
Status: DISCONTINUED | OUTPATIENT
Start: 2022-09-06 | End: 2022-09-06 | Stop reason: HOSPADM

## 2022-09-06 RX ORDER — PROMETHAZINE HYDROCHLORIDE 25 MG/1
25 TABLET ORAL EVERY 6 HOURS PRN
Status: DISCONTINUED | OUTPATIENT
Start: 2022-09-06 | End: 2022-09-07 | Stop reason: HOSPADM

## 2022-09-06 RX ORDER — ROSUVASTATIN CALCIUM 5 MG/1
10 TABLET, COATED ORAL NIGHTLY
Status: DISCONTINUED | OUTPATIENT
Start: 2022-09-06 | End: 2022-09-07 | Stop reason: HOSPADM

## 2022-09-06 RX ORDER — CARVEDILOL 6.25 MG/1
6.25 TABLET ORAL 2 TIMES DAILY WITH MEALS
Status: DISCONTINUED | OUTPATIENT
Start: 2022-09-06 | End: 2022-09-07 | Stop reason: HOSPADM

## 2022-09-06 RX ORDER — SODIUM CHLORIDE 0.9 % (FLUSH) 0.9 %
5-40 SYRINGE (ML) INJECTION PRN
Status: DISCONTINUED | OUTPATIENT
Start: 2022-09-06 | End: 2022-09-07 | Stop reason: HOSPADM

## 2022-09-06 RX ORDER — SODIUM CHLORIDE, SODIUM LACTATE, POTASSIUM CHLORIDE, CALCIUM CHLORIDE 600; 310; 30; 20 MG/100ML; MG/100ML; MG/100ML; MG/100ML
INJECTION, SOLUTION INTRAVENOUS CONTINUOUS
Status: DISCONTINUED | OUTPATIENT
Start: 2022-09-06 | End: 2022-09-06 | Stop reason: HOSPADM

## 2022-09-06 RX ORDER — OXYCODONE HYDROCHLORIDE 5 MG/1
5 TABLET ORAL PRN
Status: COMPLETED | OUTPATIENT
Start: 2022-09-06 | End: 2022-09-06

## 2022-09-06 RX ORDER — POTASSIUM CHLORIDE 750 MG/1
10 TABLET, EXTENDED RELEASE ORAL 2 TIMES DAILY
Status: DISCONTINUED | OUTPATIENT
Start: 2022-09-06 | End: 2022-09-07 | Stop reason: HOSPADM

## 2022-09-06 RX ORDER — MIDAZOLAM HYDROCHLORIDE 2 MG/2ML
2 INJECTION, SOLUTION INTRAMUSCULAR; INTRAVENOUS
Status: COMPLETED | OUTPATIENT
Start: 2022-09-06 | End: 2022-09-06

## 2022-09-06 RX ORDER — EPHEDRINE SULFATE/0.9% NACL/PF 50 MG/5 ML
SYRINGE (ML) INTRAVENOUS PRN
Status: DISCONTINUED | OUTPATIENT
Start: 2022-09-06 | End: 2022-09-06 | Stop reason: SDUPTHER

## 2022-09-06 RX ORDER — DEXAMETHASONE SODIUM PHOSPHATE 10 MG/ML
INJECTION INTRAMUSCULAR; INTRAVENOUS PRN
Status: DISCONTINUED | OUTPATIENT
Start: 2022-09-06 | End: 2022-09-06 | Stop reason: SDUPTHER

## 2022-09-06 RX ORDER — LIDOCAINE HYDROCHLORIDE 10 MG/ML
1 INJECTION, SOLUTION INFILTRATION; PERINEURAL
Status: DISCONTINUED | OUTPATIENT
Start: 2022-09-06 | End: 2022-09-06 | Stop reason: HOSPADM

## 2022-09-06 RX ORDER — SODIUM CHLORIDE 9 MG/ML
INJECTION, SOLUTION INTRAVENOUS CONTINUOUS
Status: DISCONTINUED | OUTPATIENT
Start: 2022-09-06 | End: 2022-09-07 | Stop reason: HOSPADM

## 2022-09-06 RX ORDER — ASPIRIN 81 MG/1
81 TABLET ORAL 2 TIMES DAILY
Status: DISCONTINUED | OUTPATIENT
Start: 2022-09-06 | End: 2022-09-07 | Stop reason: HOSPADM

## 2022-09-06 RX ORDER — HYDROMORPHONE HYDROCHLORIDE 1 MG/ML
0.5 INJECTION, SOLUTION INTRAMUSCULAR; INTRAVENOUS; SUBCUTANEOUS
Status: DISCONTINUED | OUTPATIENT
Start: 2022-09-06 | End: 2022-09-07 | Stop reason: HOSPADM

## 2022-09-06 RX ORDER — MAGNESIUM HYDROXIDE/ALUMINUM HYDROXICE/SIMETHICONE 120; 1200; 1200 MG/30ML; MG/30ML; MG/30ML
15 SUSPENSION ORAL EVERY 6 HOURS PRN
Status: DISCONTINUED | OUTPATIENT
Start: 2022-09-06 | End: 2022-09-07 | Stop reason: HOSPADM

## 2022-09-06 RX ORDER — ACETAMINOPHEN 500 MG
1000 TABLET ORAL EVERY 6 HOURS PRN
COMMUNITY

## 2022-09-06 RX ORDER — DIPHENHYDRAMINE HYDROCHLORIDE 50 MG/ML
12.5 INJECTION INTRAMUSCULAR; INTRAVENOUS
Status: DISCONTINUED | OUTPATIENT
Start: 2022-09-06 | End: 2022-09-06 | Stop reason: HOSPADM

## 2022-09-06 RX ORDER — OXYCODONE HYDROCHLORIDE 5 MG/1
10 TABLET ORAL EVERY 4 HOURS PRN
Status: DISCONTINUED | OUTPATIENT
Start: 2022-09-06 | End: 2022-09-07 | Stop reason: HOSPADM

## 2022-09-06 RX ORDER — ROPIVACAINE HYDROCHLORIDE 2 MG/ML
INJECTION, SOLUTION EPIDURAL; INFILTRATION; PERINEURAL
Status: COMPLETED | OUTPATIENT
Start: 2022-09-06 | End: 2022-09-06

## 2022-09-06 RX ORDER — FENTANYL CITRATE 50 UG/ML
100 INJECTION, SOLUTION INTRAMUSCULAR; INTRAVENOUS
Status: COMPLETED | OUTPATIENT
Start: 2022-09-06 | End: 2022-09-06

## 2022-09-06 RX ORDER — ACETAMINOPHEN 500 MG
1000 TABLET ORAL ONCE
Status: COMPLETED | OUTPATIENT
Start: 2022-09-06 | End: 2022-09-06

## 2022-09-06 RX ORDER — SODIUM CHLORIDE 0.9 % (FLUSH) 0.9 %
5-40 SYRINGE (ML) INJECTION PRN
Status: DISCONTINUED | OUTPATIENT
Start: 2022-09-06 | End: 2022-09-06 | Stop reason: HOSPADM

## 2022-09-06 RX ORDER — HALOPERIDOL 5 MG/ML
1 INJECTION INTRAMUSCULAR
Status: DISCONTINUED | OUTPATIENT
Start: 2022-09-06 | End: 2022-09-06 | Stop reason: HOSPADM

## 2022-09-06 RX ORDER — FENTANYL CITRATE 50 UG/ML
25 INJECTION, SOLUTION INTRAMUSCULAR; INTRAVENOUS
Status: COMPLETED | OUTPATIENT
Start: 2022-09-06 | End: 2022-09-06

## 2022-09-06 RX ORDER — LIDOCAINE HYDROCHLORIDE 20 MG/ML
INJECTION, SOLUTION EPIDURAL; INFILTRATION; INTRACAUDAL; PERINEURAL PRN
Status: DISCONTINUED | OUTPATIENT
Start: 2022-09-06 | End: 2022-09-06 | Stop reason: SDUPTHER

## 2022-09-06 RX ORDER — TRANEXAMIC ACID 100 MG/ML
INJECTION, SOLUTION INTRAVENOUS PRN
Status: DISCONTINUED | OUTPATIENT
Start: 2022-09-06 | End: 2022-09-06 | Stop reason: SDUPTHER

## 2022-09-06 RX ORDER — IPRATROPIUM BROMIDE AND ALBUTEROL SULFATE 2.5; .5 MG/3ML; MG/3ML
1 SOLUTION RESPIRATORY (INHALATION)
Status: DISCONTINUED | OUTPATIENT
Start: 2022-09-06 | End: 2022-09-06 | Stop reason: HOSPADM

## 2022-09-06 RX ORDER — ONDANSETRON 2 MG/ML
INJECTION INTRAMUSCULAR; INTRAVENOUS PRN
Status: DISCONTINUED | OUTPATIENT
Start: 2022-09-06 | End: 2022-09-06 | Stop reason: SDUPTHER

## 2022-09-06 RX ORDER — ALBUTEROL SULFATE 2.5 MG/3ML
2.5 SOLUTION RESPIRATORY (INHALATION) EVERY 6 HOURS PRN
Status: DISCONTINUED | OUTPATIENT
Start: 2022-09-06 | End: 2022-09-07 | Stop reason: HOSPADM

## 2022-09-06 RX ORDER — HYDRALAZINE HYDROCHLORIDE 20 MG/ML
10 INJECTION INTRAMUSCULAR; INTRAVENOUS
Status: DISCONTINUED | OUTPATIENT
Start: 2022-09-06 | End: 2022-09-06 | Stop reason: HOSPADM

## 2022-09-06 RX ORDER — ONDANSETRON 2 MG/ML
4 INJECTION INTRAMUSCULAR; INTRAVENOUS EVERY 6 HOURS PRN
Status: DISCONTINUED | OUTPATIENT
Start: 2022-09-06 | End: 2022-09-07 | Stop reason: HOSPADM

## 2022-09-06 RX ORDER — FLUTICASONE PROPIONATE 50 MCG
2 SPRAY, SUSPENSION (ML) NASAL DAILY
Status: DISCONTINUED | OUTPATIENT
Start: 2022-09-07 | End: 2022-09-07 | Stop reason: HOSPADM

## 2022-09-06 RX ORDER — ROPIVACAINE HYDROCHLORIDE 2 MG/ML
INJECTION, SOLUTION EPIDURAL; INFILTRATION; PERINEURAL PRN
Status: DISCONTINUED | OUTPATIENT
Start: 2022-09-06 | End: 2022-09-06 | Stop reason: ALTCHOICE

## 2022-09-06 RX ORDER — LORAZEPAM 1 MG/1
1 TABLET ORAL 2 TIMES DAILY PRN
Status: DISCONTINUED | OUTPATIENT
Start: 2022-09-06 | End: 2022-09-07 | Stop reason: HOSPADM

## 2022-09-06 RX ORDER — CELECOXIB 200 MG/1
200 CAPSULE ORAL DAILY
Status: DISCONTINUED | OUTPATIENT
Start: 2022-09-07 | End: 2022-09-07 | Stop reason: HOSPADM

## 2022-09-06 RX ORDER — SODIUM CHLORIDE 0.9 % (FLUSH) 0.9 %
5-40 SYRINGE (ML) INJECTION EVERY 12 HOURS SCHEDULED
Status: DISCONTINUED | OUTPATIENT
Start: 2022-09-06 | End: 2022-09-07 | Stop reason: HOSPADM

## 2022-09-06 RX ORDER — AMLODIPINE BESYLATE 10 MG/1
10 TABLET ORAL DAILY
Status: DISCONTINUED | OUTPATIENT
Start: 2022-09-07 | End: 2022-09-07 | Stop reason: HOSPADM

## 2022-09-06 RX ORDER — PROPOFOL 10 MG/ML
INJECTION, EMULSION INTRAVENOUS PRN
Status: DISCONTINUED | OUTPATIENT
Start: 2022-09-06 | End: 2022-09-06 | Stop reason: SDUPTHER

## 2022-09-06 RX ORDER — DIPHENHYDRAMINE HYDROCHLORIDE 50 MG/ML
25 INJECTION INTRAMUSCULAR; INTRAVENOUS EVERY 6 HOURS PRN
Status: DISCONTINUED | OUTPATIENT
Start: 2022-09-06 | End: 2022-09-07 | Stop reason: HOSPADM

## 2022-09-06 RX ORDER — HYDROMORPHONE HYDROCHLORIDE 1 MG/ML
0.5 INJECTION, SOLUTION INTRAMUSCULAR; INTRAVENOUS; SUBCUTANEOUS EVERY 5 MIN PRN
Status: DISCONTINUED | OUTPATIENT
Start: 2022-09-06 | End: 2022-09-06 | Stop reason: HOSPADM

## 2022-09-06 RX ORDER — CARVEDILOL 6.25 MG/1
6.25 TABLET ORAL ONCE
Status: COMPLETED | OUTPATIENT
Start: 2022-09-06 | End: 2022-09-06

## 2022-09-06 RX ORDER — OXYCODONE HYDROCHLORIDE 5 MG/1
5 TABLET ORAL EVERY 4 HOURS PRN
Status: DISCONTINUED | OUTPATIENT
Start: 2022-09-06 | End: 2022-09-07 | Stop reason: HOSPADM

## 2022-09-06 RX ORDER — ACETAMINOPHEN 325 MG/1
650 TABLET ORAL EVERY 6 HOURS
Status: DISCONTINUED | OUTPATIENT
Start: 2022-09-06 | End: 2022-09-07 | Stop reason: HOSPADM

## 2022-09-06 RX ORDER — AMLODIPINE BESYLATE 10 MG/1
10 TABLET ORAL ONCE
Status: COMPLETED | OUTPATIENT
Start: 2022-09-06 | End: 2022-09-06

## 2022-09-06 RX ORDER — ALBUTEROL SULFATE 90 UG/1
1 AEROSOL, METERED RESPIRATORY (INHALATION) EVERY 4 HOURS PRN
Status: DISCONTINUED | OUTPATIENT
Start: 2022-09-06 | End: 2022-09-07 | Stop reason: HOSPADM

## 2022-09-06 RX ORDER — PROCHLORPERAZINE EDISYLATE 5 MG/ML
5 INJECTION INTRAMUSCULAR; INTRAVENOUS
Status: DISCONTINUED | OUTPATIENT
Start: 2022-09-06 | End: 2022-09-06 | Stop reason: HOSPADM

## 2022-09-06 RX ORDER — SODIUM CHLORIDE 0.9 % (FLUSH) 0.9 %
5-40 SYRINGE (ML) INJECTION EVERY 12 HOURS SCHEDULED
Status: DISCONTINUED | OUTPATIENT
Start: 2022-09-06 | End: 2022-09-06 | Stop reason: HOSPADM

## 2022-09-06 RX ORDER — HYDROMORPHONE HYDROCHLORIDE 1 MG/ML
1 INJECTION, SOLUTION INTRAMUSCULAR; INTRAVENOUS; SUBCUTANEOUS
Status: DISCONTINUED | OUTPATIENT
Start: 2022-09-06 | End: 2022-09-07 | Stop reason: HOSPADM

## 2022-09-06 RX ORDER — SENNA AND DOCUSATE SODIUM 50; 8.6 MG/1; MG/1
1 TABLET, FILM COATED ORAL 2 TIMES DAILY
Status: DISCONTINUED | OUTPATIENT
Start: 2022-09-06 | End: 2022-09-07 | Stop reason: HOSPADM

## 2022-09-06 RX ORDER — KETOROLAC TROMETHAMINE 30 MG/ML
INJECTION, SOLUTION INTRAMUSCULAR; INTRAVENOUS PRN
Status: DISCONTINUED | OUTPATIENT
Start: 2022-09-06 | End: 2022-09-06 | Stop reason: ALTCHOICE

## 2022-09-06 RX ORDER — QUETIAPINE FUMARATE 25 MG/1
25 TABLET, FILM COATED ORAL NIGHTLY
Status: DISCONTINUED | OUTPATIENT
Start: 2022-09-06 | End: 2022-09-07 | Stop reason: HOSPADM

## 2022-09-06 RX ORDER — DIPHENHYDRAMINE HCL 25 MG
25 CAPSULE ORAL EVERY 6 HOURS PRN
Status: DISCONTINUED | OUTPATIENT
Start: 2022-09-06 | End: 2022-09-07 | Stop reason: HOSPADM

## 2022-09-06 RX ADMIN — Medication 3 AMPULE: at 08:45

## 2022-09-06 RX ADMIN — LIDOCAINE HYDROCHLORIDE 30 MG: 20 INJECTION, SOLUTION EPIDURAL; INFILTRATION; INTRACAUDAL; PERINEURAL at 11:06

## 2022-09-06 RX ADMIN — Medication 5 MG: at 11:13

## 2022-09-06 RX ADMIN — Medication 15 MG: at 11:42

## 2022-09-06 RX ADMIN — PROPOFOL 80 MG: 10 INJECTION, EMULSION INTRAVENOUS at 11:06

## 2022-09-06 RX ADMIN — ASPIRIN 81 MG: 81 TABLET, COATED ORAL at 20:20

## 2022-09-06 RX ADMIN — CARVEDILOL 6.25 MG: 6.25 TABLET, FILM COATED ORAL at 09:31

## 2022-09-06 RX ADMIN — HYDROMORPHONE HYDROCHLORIDE 0.5 MG: 1 INJECTION, SOLUTION INTRAMUSCULAR; INTRAVENOUS; SUBCUTANEOUS at 13:05

## 2022-09-06 RX ADMIN — Medication 2000 MG: at 10:50

## 2022-09-06 RX ADMIN — SODIUM CHLORIDE, SODIUM LACTATE, POTASSIUM CHLORIDE, AND CALCIUM CHLORIDE: 600; 310; 30; 20 INJECTION, SOLUTION INTRAVENOUS at 11:33

## 2022-09-06 RX ADMIN — ACETAMINOPHEN 650 MG: 325 TABLET, FILM COATED ORAL at 17:37

## 2022-09-06 RX ADMIN — OXYCODONE 10 MG: 5 TABLET ORAL at 17:41

## 2022-09-06 RX ADMIN — ONDANSETRON 4 MG: 2 INJECTION INTRAMUSCULAR; INTRAVENOUS at 11:09

## 2022-09-06 RX ADMIN — DEXAMETHASONE SODIUM PHOSPHATE 10 MG: 10 INJECTION INTRAMUSCULAR; INTRAVENOUS at 11:09

## 2022-09-06 RX ADMIN — GABAPENTIN 400 MG: 300 CAPSULE ORAL at 20:20

## 2022-09-06 RX ADMIN — POTASSIUM CHLORIDE 10 MEQ: 10 TABLET, EXTENDED RELEASE ORAL at 20:20

## 2022-09-06 RX ADMIN — SODIUM CHLORIDE, SODIUM LACTATE, POTASSIUM CHLORIDE, AND CALCIUM CHLORIDE 125 ML/HR: 600; 310; 30; 20 INJECTION, SOLUTION INTRAVENOUS at 08:44

## 2022-09-06 RX ADMIN — SENNOSIDES AND DOCUSATE SODIUM 1 TABLET: 50; 8.6 TABLET ORAL at 20:20

## 2022-09-06 RX ADMIN — MEPIVACAINE HYDROCHLORIDE 60 MG: 20 INJECTION, SOLUTION EPIDURAL; INFILTRATION at 10:52

## 2022-09-06 RX ADMIN — PHENYLEPHRINE HYDROCHLORIDE 100 MCG: 0.1 INJECTION, SOLUTION INTRAVENOUS at 12:10

## 2022-09-06 RX ADMIN — ROPIVACAINE HYDROCHLORIDE 20 ML: 2 INJECTION, SOLUTION EPIDURAL; INFILTRATION at 09:58

## 2022-09-06 RX ADMIN — ACETAMINOPHEN 1000 MG: 500 TABLET, FILM COATED ORAL at 08:45

## 2022-09-06 RX ADMIN — HYDROMORPHONE HYDROCHLORIDE 0.5 MG: 1 INJECTION, SOLUTION INTRAMUSCULAR; INTRAVENOUS; SUBCUTANEOUS at 13:00

## 2022-09-06 RX ADMIN — OXYCODONE 5 MG: 5 TABLET ORAL at 13:15

## 2022-09-06 RX ADMIN — CEFAZOLIN 2000 MG: 10 INJECTION, POWDER, FOR SOLUTION INTRAVENOUS at 18:21

## 2022-09-06 RX ADMIN — PHENYLEPHRINE HYDROCHLORIDE 100 MCG: 0.1 INJECTION, SOLUTION INTRAVENOUS at 11:50

## 2022-09-06 RX ADMIN — Medication 10 MG: at 11:27

## 2022-09-06 RX ADMIN — PROPOFOL 75 MCG/KG/MIN: 10 INJECTION, EMULSION INTRAVENOUS at 11:07

## 2022-09-06 RX ADMIN — ROSUVASTATIN CALCIUM 10 MG: 5 TABLET, FILM COATED ORAL at 20:20

## 2022-09-06 RX ADMIN — FENTANYL CITRATE 50 MCG: 50 INJECTION, SOLUTION INTRAMUSCULAR; INTRAVENOUS at 09:58

## 2022-09-06 RX ADMIN — AMLODIPINE BESYLATE 10 MG: 10 TABLET ORAL at 09:30

## 2022-09-06 RX ADMIN — MIDAZOLAM HYDROCHLORIDE 2 MG: 1 INJECTION, SOLUTION INTRAMUSCULAR; INTRAVENOUS at 09:58

## 2022-09-06 RX ADMIN — Medication 10 MG: at 11:38

## 2022-09-06 RX ADMIN — Medication 5 MG: at 11:08

## 2022-09-06 RX ADMIN — Medication 5 MG: at 11:22

## 2022-09-06 RX ADMIN — TRANEXAMIC ACID 1000 MG: 100 INJECTION, SOLUTION INTRAVENOUS at 10:51

## 2022-09-06 RX ADMIN — CARVEDILOL 6.25 MG: 6.25 TABLET, FILM COATED ORAL at 17:40

## 2022-09-06 ASSESSMENT — PAIN - FUNCTIONAL ASSESSMENT
PAIN_FUNCTIONAL_ASSESSMENT: 0-10
PAIN_FUNCTIONAL_ASSESSMENT: PREVENTS OR INTERFERES SOME ACTIVE ACTIVITIES AND ADLS

## 2022-09-06 ASSESSMENT — PAIN DESCRIPTION - LOCATION
LOCATION: KNEE

## 2022-09-06 ASSESSMENT — PAIN DESCRIPTION - ORIENTATION
ORIENTATION: LEFT

## 2022-09-06 ASSESSMENT — PAIN SCALES - GENERAL
PAINLEVEL_OUTOF10: 7
PAINLEVEL_OUTOF10: 8
PAINLEVEL_OUTOF10: 5
PAINLEVEL_OUTOF10: 10
PAINLEVEL_OUTOF10: 4
PAINLEVEL_OUTOF10: 5
PAINLEVEL_OUTOF10: 5

## 2022-09-06 ASSESSMENT — PAIN DESCRIPTION - DESCRIPTORS
DESCRIPTORS: ACHING;THROBBING
DESCRIPTORS: ACHING;BURNING
DESCRIPTORS: ACHING

## 2022-09-06 ASSESSMENT — PAIN SCALES - WONG BAKER: WONGBAKER_NUMERICALRESPONSE: 2

## 2022-09-06 ASSESSMENT — PAIN DESCRIPTION - PAIN TYPE: TYPE: SURGICAL PAIN

## 2022-09-06 NOTE — ANESTHESIA PRE PROCEDURE
Department of Anesthesiology  Preprocedure Note       Name:  Azeb Yeung   Age:  69 y.o.  :  1955                                          MRN:  [id number]         Date:  2022      Surgeon: Paulette Jesus):  Enid Randhawa MD    Procedure: Procedure(s):  LEFT KNEE TOTAL ARTHROPLASTY    Medications prior to admission:   Prior to Admission medications    Medication Sig Start Date End Date Taking? Authorizing Provider   acetaminophen (TYLENOL) 500 MG tablet Take 1,000 mg by mouth every 6 hours as needed for Pain   Yes Historical Provider, MD   traMADol (ULTRAM) 50 MG tablet Take 50 mg by mouth every 6 hours as needed for Pain. Historical Provider, MD   LORazepam (ATIVAN) 1 MG tablet Take 1 mg by mouth 2 times daily as needed. Historical Provider, MD   linaclotide (LINZESS) 145 MCG capsule Take 145 mcg by mouth every morning (before breakfast)    Historical Provider, MD   albuterol sulfate  (90 Base) MCG/ACT inhaler Inhale 1 puff into the lungs every 4 hours as needed 21   Ar Automatic Reconciliation   amLODIPine (NORVASC) 10 MG tablet Take 10 mg by mouth daily 18   Ar Automatic Reconciliation   aspirin 81 MG EC tablet Take 81 mg by mouth daily    Ar Automatic Reconciliation   carvedilol (COREG) 6.25 MG tablet Take 6.25 mg by mouth 2 times daily (with meals) 17   Ar Automatic Reconciliation   Diclofenac Sodium 2 % SOLN Apply 4 Act topically 2 times daily  Patient not taking: No sig reported 17   Ar Automatic Reconciliation   DULoxetine (CYMBALTA) 60 MG extended release capsule Take 60 mg by mouth 2 times daily 17   Ar Automatic Reconciliation   fenofibrate micronized (LOFIBRA) 200 MG capsule Take 200 mg by mouth 17   Ar Automatic Reconciliation   fluticasone (FLONASE) 50 MCG/ACT nasal spray 1 to 2 sprays each nostril daily    Ar Automatic Reconciliation   gabapentin (NEURONTIN) 100 MG capsule Take 400 mg by mouth at bedtime.  17   Ar Automatic Reconciliation hydroCHLOROthiazide (HYDRODIURIL) 25 MG tablet Take 25 mg by mouth daily 3/3/17   Ar Automatic Reconciliation   Magnesium Oxide 500 MG TABS Take by mouth  Patient not taking: Reported on 9/6/2022    Ar Automatic Reconciliation   meloxicam (MOBIC) 7.5 MG tablet Take 7.5 mg by mouth daily 3/3/17   Ar Automatic Reconciliation   naloxone 4 MG/0.1ML LIQD nasal spray 1 spray by Nasal route once  Patient not taking: Reported on 9/6/2022 2/12/18   Ar Automatic Reconciliation   potassium chloride (KLOR-CON) 10 MEQ extended release tablet Take 10 mEq by mouth 2 times daily 4/27/17   Ar Automatic Reconciliation   QUEtiapine (SEROQUEL) 25 MG tablet Take 25 mg by mouth at bedtime 5/30/17   Ar Automatic Reconciliation   rosuvastatin (CRESTOR) 20 MG tablet Take 20 mg by mouth 9/18/17   Ar Automatic Reconciliation       Current medications:    Current Facility-Administered Medications   Medication Dose Route Frequency Provider Last Rate Last Admin    lidocaine 1 % injection 1 mL  1 mL IntraDERmal Once PRN Linda Guzmán MD        lactated ringers infusion   IntraVENous Continuous Linda Guzmán  mL/hr at 09/06/22 0934 NoRateChange at 09/06/22 0934    sodium chloride flush 0.9 % injection 5-40 mL  5-40 mL IntraVENous 2 times per day Linda Guzmán MD        sodium chloride flush 0.9 % injection 5-40 mL  5-40 mL IntraVENous PRN Linda Guzmán MD        0.9 % sodium chloride infusion   IntraVENous PRN Linda Guzmán MD        ceFAZolin (ANCEF) 2000 mg in sterile water 20 mL IV syringe  2,000 mg IntraVENous On Call to [address] [place] [address] Rd, [place]           Allergies:     Allergies   Allergen Reactions    Benazepril Other (See Comments)    Erythromycin Other (See Comments)     Other reaction(s): Abdominal pain-I  Other reaction(s): Abdominal pain-I  GI upset       Problem List:    Patient Active Problem List   Diagnosis Code    Hypertension I10    Recurrent major depressive disorder, in partial remission ([facility] [address] F33.41    Arthralgia of hip M25.559    Osteoporosis M81.0    Hyperlipemia E78.5    Unilateral primary osteoarthritis, left knee M17.12    Suspected sleep apnea R29.818    Arthritis of left knee M17.12       Past Medical History:        Diagnosis Date    Allergic rhinitis     Anxiety     Arthritis     Autoimmune hepatitis ([address]     Chronic pain syndrome     Depression     Ear problems     Elevated liver enzymes     resolved per pt    Facet arthritis of lumbar region     GERD (gastroesophageal reflux disease)     Hearing reduced     Hyperlipemia     managed with medication    Hypertension     managed with medication    Iron deficiency anemia     hx    Menopausal disorder     Metabolic syndrome     Osteoporosis     Restrictive lung disease     PRN inhaler-has not had to use    Thickened endometrium        Past Surgical History:        Procedure Laterality Date    CHOLECYSTECTOMY      ERCP  05/2021    ORTHOPEDIC SURGERY Left 03/13/2014    hip fracture and sugery- fell at work    OTHER SURGICAL HISTORY  2001    sphincterotomy- Dr. Buzzy Bernheim       Social History:    Social History     Tobacco Use    Smoking status: Never    Smokeless tobacco: Never   Substance Use Topics    Alcohol use:  No                                Counseling given: Not Answered      Vital Signs (Current):   Vitals:    09/06/22 0820 09/06/22 0958 09/06/22 1001 09/06/22 1006   BP:  (!) 166/81 (!) 170/81 (!) 165/79   Pulse:  79 76 74   Resp:  16 16 16   Temp:       TempSrc:       SpO2:  98% 98% 97%   Weight:       Height: 5' 2\" (1.575 m)                                                 BP Readings from Last 3 Encounters:   09/06/22 (!) 165/79   08/23/22 (!) 170/72   06/25/21 (!) 148/85       NPO Status: Time of last liquid consumption: 2355                        Time of last solid consumption: 2000                        Date of last liquid consumption: 09/05/22                        Date of last solid food consumption: 09/05/22    BMI:   Wt Readings from Last 3 Encounters:   09/06/22 221 lb 3.2 oz (100.3 kg)   08/23/22 223 lb (101.2 kg)   07/11/22 226 lb (102.5 kg)     Body mass index is 40.46 kg/m². CBC:   Lab Results   Component Value Date/Time    WBC 9.0 08/23/2022 09:13 AM    RBC 4.59 08/23/2022 09:13 AM    HGB 13.5 08/23/2022 09:13 AM    HCT 41.8 08/23/2022 09:13 AM    MCV 91.1 08/23/2022 09:13 AM    RDW 14.5 08/23/2022 09:13 AM     08/23/2022 09:13 AM       CMP:   Lab Results   Component Value Date/Time     08/23/2022 09:13 AM    K 3.2 08/23/2022 09:13 AM     08/23/2022 09:13 AM    CO2 29 08/23/2022 09:13 AM    BUN 15 08/23/2022 09:13 AM    CREATININE 0.87 08/23/2022 09:13 AM    GFRAA >60 08/23/2022 09:13 AM    LABGLOM >60 08/23/2022 09:13 AM    GLUCOSE 171 08/23/2022 09:13 AM    CALCIUM 9.2 08/23/2022 09:13 AM       POC Tests: No results for input(s): POCGLU, POCNA, POCK, POCCL, POCBUN, POCHEMO, POCHCT in the last 72 hours. Coags:   Lab Results   Component Value Date/Time    PROTIME 12.3 08/23/2022 09:13 AM    INR 0.9 08/23/2022 09:13 AM    APTT 26.7 08/23/2022 09:13 AM       HCG (If Applicable): No results found for: PREGTESTUR, PREGSERUM, HCG, HCGQUANT     ABGs: No results found for: PHART, PO2ART, HGJ6DGB, JCI0XRV, BEART, C1VOVIKZ     Type & Screen (If Applicable):  No results found for: LABABO, LABRH    Drug/Infectious Status (If Applicable):  No results found for: HIV, HEPCAB    COVID-19 Screening (If Applicable): No results found for: COVID19        Anesthesia Evaluation  Patient summary reviewed and Nursing notes reviewed no history of anesthetic complications:   Airway: Mallampati: II  TM distance: <3 FB   Neck ROM: full  Mouth opening: < 3 FB   Dental: normal exam         Pulmonary:normal exam                              ROS comment: PFT 2021: Abnormal spirometry consistent with a mild restrictive defect. This is confirmed by lung volumes which are also reduced.   This is at least in part secondary to obesity. Diffusion was normal.    Cardiovascular:  Exercise tolerance: no interval change,   (+) hypertension:, hyperlipidemia                  Neuro/Psych:   (+) depression/anxiety             GI/Hepatic/Renal:   (+) GERD:, liver disease:, morbid obesity          Endo/Other:    (+) : arthritis:., .                 Abdominal:             Vascular: Other Findings:           Anesthesia Plan      spinal     ASA 3             Anesthetic plan and risks discussed with patient.               Post-op pain plan if not by surgeon: single peripheral nerve block            Santiago Momin MD   9/6/2022

## 2022-09-06 NOTE — PERIOP NOTE
TRANSFER - OUT REPORT:    Verbal report given to ADRIAN Amaral on [employer]  being transferred to [address] [place] [address] for routine progression of patient care       Report consisted of patient's Situation, Background, Assessment and   Recommendations(SBAR). Information from the following report(s) Nurse Handoff Report and Cardiac Rhythm SR  was reviewed with the receiving nurse. Lines:   Peripheral IV 09/06/22 Left; Anterior Forearm (Active)   Site Assessment Clean, dry & intact 09/06/22 1337   Line Status Infusing 09/06/22 1337   Phlebitis Assessment No symptoms 09/06/22 1337   Infiltration Assessment 0 09/06/22 1337   Alcohol Cap Used No 09/06/22 1337   Dressing Status Clean, dry & intact 09/06/22 1337   Dressing Type Transparent 09/06/22 1337        Opportunity for questions and clarification was provided.       Patient transported with:  O2 @ 4lpm

## 2022-09-06 NOTE — PROGRESS NOTES
OCCUPATIONAL THERAPY Initial Assessment, Daily Note, and PM      (Link to Caseload Tracking:    OT Orders   Time  OT Charge Capture  Rehab Caseload Tracker  Episode     Merlene Perdue is a 69 y.o. female   PRIMARY DIAGNOSIS: Arthritis of left knee  Unilateral primary osteoarthritis, left knee [M17.12]  Arthritis of left knee [M17.12]  Procedure(s) (LRB):  ROBOTIC ASSISTED LEFT KNEE TOTAL ARTHROPLASTY (Left)  Day of Surgery  Reason for Referral: Pain in Left Knee (M25.562)  Stiffness of Left Knee, Not elsewhere classified (F70.204)  Inpatient: Payor: Juan Breed / Plan: [employer] / Product Type: *No Product type* /     ASSESSMENT:     REHAB RECOMMENDATIONS:   Recommendation to date pending progress:  Setting:  Home Health Therapy    Equipment:    3 in 1 Bedside Commode  Rolling Walker     ASSESSMENT:  Ms. Fawad Shen is s/p left TKA and presents with decreased independence with functional mobility and activities of daily living as compared to baseline level of function and safety. Patient would benefit from skilled Occupational Therapy to maximize independence and safety with self-care task and functional mobility. Patient up to edge of bed declined dressing but donned a second gown as a robe at edge of bed with stand by assist assist.  Mobilized from hospital bed to hallway using a rolling walker with assist. Patient is hopeful to spend the night to better prepare for safe discharge home. Pt should do well tomorrow and will go home with support from sister.       [address] [address] AM-PAC 6 Clicks Daily Activity Inpatient Short Form:    AM-PAC Daily Activity Inpatient   How much help for putting on and taking off regular lower body clothing?: A Little  How much help for Bathing?: A Little  How much help for Toileting?: A Little  How much help for putting on and taking off regular upper body clothing?: None  How much help for taking care of personal grooming?: None  How much help for eating meals?: None  AM-PAC Inpatient Daily Activity Raw Score: 21  AM-PAC Inpatient ADL T-Scale Score : 44.27  ADL Inpatient CMS 0-100% Score: 32.79  ADL Inpatient CMS G-Code Modifier : CJ     SUBJECTIVE:     Ms. Tawnya Muniz states she is staying the night        Social/Functional   Lives With: Family  Type of Home: House  Home Layout: One level  Home Access: Stairs to enter with rails  Entrance Stairs - Number of Steps: 1  Entrance Stairs - Rails: Left  Bathroom Shower/Tub: Walk-in shower  Bathroom Equipment: Built-in shower seat, Grab bars in shower  Home Equipment: Cane    OBJECTIVE:     Crista Sours / Green Bindu / Melanie Fillers: None    RESTRICTIONS/PRECAUTIONS:  Restrictions/Precautions: Weight Bearing  Left Lower Extremity Weight Bearing: Weight Bearing As Tolerated    PAIN: VITALS / O2:   Pre Treatment:          Post Treatment: none Vitals          Oxygen        GROSS EVALUATION: INTACT IMPAIRED   (See Comments)   UE AROM [x] []   UE PROM [x] []   Strength [x]       Posture / Balance []  Slightly decreased standing balance    Sensation [x]     Coordination [x]       Tone [x]       Edema []    Activity Tolerance [x]       Hand Dominance R [] L []      COGNITION/  PERCEPTION: INTACT IMPAIRED   (See Comments)   Orientation [x]     Vision [x]     Hearing [x]     Cognition  [x]     Perception [x]       MOBILITY: I Mod I S SBA CGA Min Mod Max Total  NT x2 Comments:   Bed Mobility    Rolling [] [] [] [] [] [] [] [] [] [] []    Supine to Sit [] [] [] [x] [] [] [] [] [] [] []    Scooting [] [] [] [x] [] [] [] [] [] [] []    Sit to Supine [] [] [] [] [] [] [] [] [] [] []    Transfers    Sit to Stand [] [] [] [] [] [x] [] [] [] [] []    Bed to Chair [] [] [] [] [] [x] [] [] [] [] []    Stand to Sit [] [] [] [] [] [x] [] [] [] [] []    Tub/Shower [] [] [] [] [] [x] [] [] [] [] []       Toilet [] [] [] [] [] [x] [] [] [] [] []        [] [] [] [] [] [] [] [] [] [] []    I=Independent, Mod I=Modified Independent, S=Supervision/Setup, SBA=Standby Assistance, CGA=Contact Abdulaziz Schwab, Min=Minimal Assistance, Mod=Moderate Assistance, Max=Maximal Assistance, Total=Total Assistance, NT=Not Tested    ACTIVITIES OF DAILY LIVING: I Mod I S SBA CGA Min Mod Max Total NT Comments   BASIC ADLs:              Upper Body Bathing [] [] [] [x] [] [] [] [] [] []    Lower Body Bathing [] [] [] [] [x] [] [] [] [] []    Toileting [] [] [] [] [] [x] [] [] [] []    Upper Body Dressing [] [] [] [x] [] [] [] [] [] []    Lower Body Dressing [] [] [] [] [] [x] [] [] [] []    Feeding [x] [] [] [] [] [] [] [] [] []    Grooming [] [] [] [x] [] [] [] [] [] []    Personal Device Care [] [] [] [] [] [] [] [] [] []    Functional Mobility [] [] [] [] [] [x] [] [] [] []    I=Independent, Mod I=Modified Independent, S=Supervision/Setup, SBA=Standby Assistance, CGA=Contact Guard Assistance, Min=Minimal Assistance, Mod=Moderate Assistance, Max=Maximal Assistance, Total=Total Assistance, NT=Not Tested    PLAN:     FREQUENCY/DURATION   OT Plan of Care: 1 time/week for duration of hospital stay or until stated goals are met, whichever comes first.    ACUTE OCCUPATIONAL THERAPY GOALS:   (Developed with and agreed upon by patient and/or caregiver.)    GOALS:   DISCHARGE GOALS (in preparation for going home/rehab):  3 days  1. Ms. Kate Santamaria will perform lower body dressing activity with stand by assist required to demonstrate improved functional mobility and safety. 2.  Ms. Kate Santamaria will perform bathing activity with stand by assist required to demonstrate improved functional mobility and safety. 3.  Ms. Kate Santamaria will perform toileting activity with  stand by assist to demonstrate improved functional mobility and safety. 4.  Ms. Kate Santamaria will perform all functional transfers transfer with stand by assist to demonstrate improved functional mobility and safety.       PROBLEM LIST:   (Skilled intervention is medically necessary to address:)  Decreased ADL/Functional Activities  Decreased Balance  Increased Pain   INTERVENTIONS PLANNED:   (Benefits and precautions of occupational therapy have been discussed with the patient.)  Self Care Training  Education         TREATMENT:     EVALUATION: LOW COMPLEXITY: (Untimed Charge)    TREATMENT:   Self Care: (10 min): Procedure(s) (per grid) utilized to improve and/or restore self-care/home management as related to dressing and functional mobility . Required minimal verbal, manual, and   cueing to facilitate activities of daily living skills and compensatory activities.       AFTER TREATMENT PRECAUTIONS: Bed/Chair Locked, Call light within reach, Chair, Needs within reach, and Visitors at bedside    INTERDISCIPLINARY COLLABORATION:  RN/ PCT, PT/ PTA, and OT/ MERINO    EDUCATION:  Education Given To: Patient, Family  Education Provided: Role of Therapy, Plan of Care, Transfer Training, Equipment, Fall Prevention Strategies  [] Adrienne And Brandon Alonzo  [x] Fall Precautions  [] Hip Precautions  [] D/C Instruction Review [] Prosthesis Review  [x] Walker Management/Safety  [x] [employer] Equipment as Needed  [x] Therapeutic Resting Position of Joint       TOTAL TREATMENT DURATION AND TIME:  Time In: 1306  Time Out: 1600  Minutes: [address], OT

## 2022-09-06 NOTE — PROGRESS NOTES
09/06/22 1521   Oxygen Therapy/Pulse Ox   O2 Therapy Room air   O2 Flow Rate (L/min) 0 L/min   Heart Rate 81   SpO2 91 %   Patient achieved 1200 MI/sec on IS. Patient encouraged to do 10 breaths every hour while awake-patient agreed and demonstrated. No shortness of breath or distress noted. BS are clear b/l. [facility] [place] notes reviewed- continuous Sat monitor order noted HS.

## 2022-09-06 NOTE — PROGRESS NOTES
PHYSICAL THERAPY JOINT CAMP: TOTAL KNEE ARTHROPLASTY Initial Assessment and PM  (Link to Caseload Tracking:    Acknowledge Orders  Time In/Out  PT Charge Capture  Rehab Caseload Tracker  Episode   Isidoro Mann is a 69 y.o. female   PRIMARY DIAGNOSIS: Arthritis of left knee  Unilateral primary osteoarthritis, left knee [M17.12]  Arthritis of left knee [M17.12]  Procedure(s) (LRB):  ROBOTIC ASSISTED LEFT KNEE TOTAL ARTHROPLASTY (Left)  Day of Surgery  Reason for Referral: Pain in Left Knee (M25.562)  Stiffness of Left Knee, Not elsewhere classified (M25.662)  Difficulty in walking, Not elsewhere classified (R26.2)  Inpatient: Payor: Magdaleno Rendon / Plan: [employer] / Product Type: *No Product type* /     REHAB RECOMMENDATIONS:   Recommendation to date pending progress:  Setting:  Home Health Therapy    Equipment:    Rolling Walker     RANGE OF MOTION:   Will assess at next session     GAIT: I Mod I S SBA CGA Min Mod Max Total  NT x2 Comments:   Level of Assistance [] [] [] [x] [x] [] [] [] [] [] []            Weightbearing Status  Left Lower Extremity Weight Bearing: Weight Bearing As Tolerated    Distance  85 feet    Gait Quality Antalgic, Decreased adams , Decreased step clearance, Decreased step length, and Decreased stance    DME Rolling Walker     Stairs      Ramp     I=Independent, Mod I=Modified Independent, S=Supervision, SBA=Standby Assistance, CGA=Contact Guard Assistance,   Min=Minimal Assistance, Mod=Moderate Assistance, Max=Maximal Assistance, Total=Total Assistance, NT=Not Tested    ASSESSMENT:   ASSESSMENT:  Ms. Chucho Guerrero presents with decreased strength and range of motion left lower extremity and with decreased independence with functional mobility s/p left total knee arthroplasty. Pt will benefit from skilled PT interventions to maximize independence with functional mobility and TKA management. Pt did well with assessment and had already been up to the bathroom twice.    Today's treatment focused on transfer and gait training. Worked on walking in the lucio with RW and verbal cues. Pt practiced TKA exercises as below with verbal cues while reviewing HEP. Reviewed use of cryocuff as needed for pain and swelling. Pt instructed not to get up without assist. Pt plans to discharge to home from the hospital with continued therapy for follow up. Patient is hopeful to spend the night to better prepare for safe discharge home.  .     Outcome Measure:   KOOS-JR:   KOJr JOHN. Knee Survey Score: 13    SUBJECTIVE:   Ms. Christie Gee states, \"I have already been up\"     Home Environment/Prior Level of Function Lives With: Family  Type of Home: House  Home Layout: One level  Home Access: Stairs to enter with rails  Entrance Stairs - Number of Steps: 1  Entrance Stairs - Rails: Left  Bathroom Shower/Tub: Walk-in shower  Bathroom Equipment: Built-in shower seat, Grab bars in shower  Home Equipment: Cane    OBJECTIVE:     PAIN: VITAL SIGNS: LINES/DRAINS:   Pre Treatment: no reports of pain         Post Treatment: no reports of pain Vitals        Oxygen    None    RESTRICTIONS/PRECAUTIONS:  Restrictions/Precautions: Weight Bearing  Left Lower Extremity Weight Bearing: Weight Bearing As Tolerated        Restrictions/Precautions: Weight Bearing        LOWER EXTREMITY GROSS EVALUATION:  RIGHT LE   Within Functional Limits   Abnormal   Comments   Strength [x] []     Range of Motion [x] []        LEFT LE Within Functional Limits   Abnormal   Comments   Strength [] []  Generally decreased, s/p TKA   Range of Motion [] []  Generally decreased, s/p TKA     UPPER EXTREMITY GROSS EVALUATION:     Within Functional Limits   Abnormal   Comments   Strength [x] []    Range of Motion [x] []      SENSATION  Sensation [x]       COGNITION/  PERCEPTION: Intact Impaired (Comments):   Orientation [x]     Vision [x]     Hearing [x]     Cognition  [x]       MOBILITY: I Mod I S SBA CGA Min Mod Max Total  NT x2 Comments:   Bed Mobility Ability, Decreased Strength, Decreased Transfer Abilities, and Increased Pain   INTERVENTIONS PLANNED:   (Benefits and precautions of physical therapy have been discussed with the patient.)  Therapeutic Activity, Therapeutic Exercise/HEP, Gait Training, Modalities, and Education       TREATMENT:   EVALUATION: LOW COMPLEXITY: (Untimed Charge)    TREATMENT:   Therapeutic Activity (13 Minutes): Therapeutic activity included Supine to Sit, Transfer Training, Ambulation on level ground, Sitting balance , and Standing balance to improve functional Activity tolerance, Mobility, and Strength. Therapeutic Exercise (10 Minutes): Therapeutic exercises noted below to improve functional AROM, strength, and mobility. TREATMENT GRID:  THERAPEUTIC  EXERCISES: DATE:  9/6 DATE:   DATE:      AM PM AM PM AM PM    [] [] [] [] [] []   Ankle Pumps  10       Quad Sets  10       Gluteal Sets  10       Hip Abd/ADduction  10       Straight Leg Raises  10       Knee Slides  10       Short Arc Quads         Chair Slides                           B = bilateral; AA = active assistive; A = active; P = passive      EDUCATION: Education Given To: Patient  Education Provided: Role of Therapy, Plan of Care, Home Exercise Program  Barriers to Learning: None  Education Outcome: Verbalized understanding, Demonstrated understanding  EDUCATION:  [x] Home Exercises  [x] Fall Precautions  [] No Pillow Under Knee  [] D/C Instruction Review [] Cryocuff  [] Walker Management/Safety  [] Adaptive Equipment as Needed     AFTER TREATMENT PRECAUTIONS: Bed/Chair Locked, Chair, Needs within reach, and Visitors at bedside    INTERDISCIPLINARY COLLABORATION:  RN/ PCT and OT/ MERINO    COMPLIANCE WITH PROGRAM/EXERCISE: compliant all of the time, Will assess as treatment progresses. RECOMMENDATIONS/INTENT FOR NEXT TREATMENT SESSION: Treatment next visit will focus on increasing Ms. Luke's independence with bed mobility, transfers, gait training, strength/ROM exercises, modalities for pain, and patient education.      TIME IN/OUT:  Time In: 1538  Time Out: [address]  Minutes: [address] [facility],

## 2022-09-06 NOTE — ANESTHESIA PROCEDURE NOTES
Peripheral Block    Patient location during procedure: pre-op  Reason for block: post-op pain management and at surgeon's request  Start time: 9/6/2022 9:58 AM  End time: 9/6/2022 10:01 AM  Staffing  Performed: anesthesiologist   Anesthesiologist: Mary Lima MD  Preanesthetic Checklist  Completed: patient identified, IV checked, site marked, risks and benefits discussed, surgical/procedural consents, equipment checked, pre-op evaluation, timeout performed, anesthesia consent given, oxygen available and monitors applied/VS acknowledged  Peripheral Block   Patient position: supine  Prep: ChloraPrep  Provider prep: mask  Patient monitoring: cardiac monitor, continuous pulse ox, frequent blood pressure checks, IV access, oxygen and responsive to questions  Block type: Femoral  Adductor canal  Laterality: left  Injection technique: single-shot  Guidance: ultrasound guided    Needle   Needle type: insulated echogenic nerve stimulator needle   Needle gauge: 20 G  Needle localization: ultrasound guidance  Assessment   Injection assessment: negative aspiration for heme, no paresthesia on injection, local visualized surrounding nerve on ultrasound and no intravascular symptoms  Paresthesia pain: none  Slow fractionated injection: yes  Hemodynamics: stable  Real-time US image taken/store: yes  Outcomes: patient tolerated procedure well and uncomplicated    Additional Notes  Ultrasound image taken/on chart.   Medications Administered  ropivacaine (NAROPIN) injection 0.2% - Perineural   20 mL - 9/6/2022 9:58:00 AM  dexamethasone 4 MG/ML - Perineural   4 mg - 9/6/2022 9:58:00 AM

## 2022-09-06 NOTE — OP NOTE
[address] - PS  Patient:Anjana Luke   : 1955  Medical Record Number:[id number]  Pre-operative Diagnosis:  Unilateral primary osteoarthritis, left knee [M17.12]  Arthritis of left knee [M17.12]  Post-operative Diagnosis: Unilateral primary osteoarthritis, left knee [M17.12]  Arthritis of left knee [M17.12]    Surgeon: Clarence Vivas MD  Assistant: Andrew Garcia PA-C    This surgical assistant was present for the procedure and vital for the performance of the procedure. He assisted with exposure and retraction during the procedure as well as wound closure and dressing application after the procedure. Anesthesia: Spinal    Procedure: Total Knee Arthroplasty   The complexity of the total joint surgery requires the use of a first assistant for positioning, retraction and assistance in closure. The patient's Body mass index is 40.46 kg/m²., BMI's greater then 35 make surgical exposure and retraction extremely difficult and increase operative time. Tourniquet Time: none  EBL: 200cc  Additional Findings: Severe Varus DJD/ Osteoporosis  Releases none    Anjana Espinosa was brought to the operating room and positioned on the operating table. She was anethestized  IV antibiotics were administered per CMS protocol. Prior to the incision being made a timeout was called identifying the patient, procedure ,operative side and surgeon. The left leg was prepped and draped in the usual sterile manner  An anterior longitudinal incision was accomplished just medial to the tibial tubercle and extending approximal 6 centimeters proximal to the superior pole of the patella. A medial parapatellar capsular incision was performed. The medial capsular flap was elevated around to the insertion of the semimembranous tendon. The patella was everted and the knee flexed and externally rotated. The medial and external menisci were excised.   The

## 2022-09-06 NOTE — CONSULTS
[facility] Hospitalist Consult   Admit Date:  2022  7:37 AM   Name:  Waldo Goodrich   Age:  69 y.o. Sex:  female  :  1955   MRN:  [id number]   Room:  [facility] [place] 2]    Presenting Complaint: No chief complaint on file. Reason(s) for Admission: Unilateral primary osteoarthritis, left knee [M17.12]  Arthritis of left knee [M17.12]     Hospitalists consulted by Leobardo Lawson MD for: medical management    History of Presenting Illness:   Waldo Goodrich is a 69 y.o. female with history of HTN, ANTON, HLD who was admitted for left knee arthroplasty. Hospitalist asked to consult for medical management. A&O x3, left knee dressing dry/intact. Pain controlled. Review of Systems:  10 systems reviewed and negative except as noted in HPI. Assessment & Plan:     Principal Problem:    Arthritis of left knee  Plan: per ortho. Active Problems:    Suspected sleep apnea  Plan:       Hypertension  Plan: home meds resumed  Controlled      Recurrent major depressive disorder, in partial remission ([address]  Plan: home meds      Hyperlipemia  Plan: statin resumed      Discharge Planning:      Per ortho. Diet:  ADULT DIET; Regular  DVT PPx: ASA, SCDs  Code status: Prior    Principal Problem:    Arthritis of left knee  Active Problems:    Suspected sleep apnea    Hypertension    Recurrent major depressive disorder, in partial remission ([address]    Hyperlipemia  Resolved Problems:    * No resolved hospital problems.  *      Past History:    Past Medical History:   Diagnosis Date    Allergic rhinitis     Anxiety     Arthritis     Autoimmune hepatitis ([address]     Chronic pain syndrome     Depression     Ear problems     Elevated liver enzymes     resolved per pt    Facet arthritis of lumbar region     GERD (gastroesophageal reflux disease)     Hearing reduced     Hyperlipemia     managed with medication    Hypertension     managed with medication    Iron deficiency anemia     hx    Menopausal disorder     Metabolic syndrome     Osteoporosis     Restrictive lung disease     PRN inhaler-has not had to use    Thickened endometrium        Past Surgical History:   Procedure Laterality Date    CHOLECYSTECTOMY      ERCP  05/2021    ORTHOPEDIC SURGERY Left 03/13/2014    hip fracture and sugery- fell at work    OTHER SURGICAL HISTORY  2001    sphincterotomy- Dr. Servin Duty History     Tobacco Use    Smoking status: Never    Smokeless tobacco: Never   Substance Use Topics    Alcohol use: No      Social History     Substance and Sexual Activity   Drug Use Not on file       Family History   Problem Relation Age of Onset    Cancer Brother         lung    Cancer Mother         breast    Breast Cancer Mother 15          Immunization History   Administered Date(s) Administered    COVID-19, MODERNA BLUE border, Primary or Immunocompromised, (age 12y+), IM, 100 mcg/0.5mL 05/04/2021, 06/07/2021    INFLUENZA, INTRADERMAL, QUADRIVALENT, PRESERVATIVE FREE 10/10/2017    Influenza Quadv 10/01/2019    Influenza Virus Vaccine 10/13/2015, 10/14/2016    Influenza, FLUAD, (age 72 y+), Adjuvanted, 0.5mL 10/19/2020    Influenza, FLUARIX, FLULAVAL, FLUZONE (age 10 mo+) AND AFLURIA, (age 1 y+), PF, 0.5mL 10/09/2018    Influenza, Triv, inactivated, subunit, adjuvanted, IM (Fluad 65 yrs and older) 10/17/2019    Pneumococcal Conjugate 13-valent (Ofuttyr31) 09/04/2019    Pneumococcal Polysaccharide (Xpeinzlzr51) 09/30/2020    Tdap (Boostrix, Adacel) 07/14/2006    Zoster Live (Zostavax) 05/06/2015     Allergies   Allergen Reactions    Benazepril Other (See Comments)    Erythromycin Other (See Comments)     Other reaction(s): Abdominal pain-I  Other reaction(s): Abdominal pain-I  GI upset      Current Facility-Administered Medications   Medication Dose Route Frequency    amLODIPine (NORVASC) tablet 10 mg  10 mg Oral Daily    albuterol sulfate HFA (PROVENTIL;VENTOLIN;PROAIR) 108 (90 Base) MCG/ACT inhaler 1 puff  1 puff Inhalation Q4H PRN    carvedilol (COREG) tablet 6.25 mg  6.25 mg Oral BID WC    DULoxetine (CYMBALTA) extended release capsule 60 mg  60 mg Oral BID    [START ON 9/7/2022] fluticasone (FLONASE) 50 MCG/ACT nasal spray 2 spray  2 spray Each Nostril Daily    gabapentin (NEURONTIN) capsule 400 mg  400 mg Oral Nightly    hydroCHLOROthiazide (HYDRODIURIL) tablet 25 mg  25 mg Oral Daily    [START ON 9/7/2022] linaclotide (LINZESS) capsule 145 mcg  145 mcg Oral QAM AC    LORazepam (ATIVAN) tablet 1 mg  1 mg Oral BID PRN    potassium chloride (KLOR-CON) extended release tablet 10 mEq  10 mEq Oral BID    QUEtiapine (SEROQUEL) tablet 25 mg  25 mg Oral Nightly    rosuvastatin (CRESTOR) tablet 20 mg  20 mg Oral Nightly    0.9 % sodium chloride infusion   IntraVENous Continuous    sodium chloride flush 0.9 % injection 5-40 mL  5-40 mL IntraVENous 2 times per day    sodium chloride flush 0.9 % injection 5-40 mL  5-40 mL IntraVENous PRN    0.9 % sodium chloride infusion   IntraVENous PRN    acetaminophen (TYLENOL) tablet 650 mg  650 mg Oral Q6H    oxyCODONE (ROXICODONE) immediate release tablet 5 mg  5 mg Oral Q4H PRN    Or    oxyCODONE (ROXICODONE) immediate release tablet 10 mg  10 mg Oral Q4H PRN    HYDROmorphone HCl PF (DILAUDID) injection 0.5 mg  0.5 mg IntraVENous Q3H PRN    Or    HYDROmorphone HCl PF (DILAUDID) injection 1 mg  1 mg IntraVENous Q3H PRN    ceFAZolin (ANCEF) 2000 mg in sterile water 20 mL IV syringe  2,000 mg IntraVENous Q8H    promethazine (PHENERGAN) tablet 25 mg  25 mg Oral Q6H PRN    Or    ondansetron (ZOFRAN) injection 4 mg  4 mg IntraVENous Q6H PRN    sennosides-docusate sodium (SENOKOT-S) 8.6-50 MG tablet 1 tablet  1 tablet Oral BID    aspirin EC tablet 81 mg  81 mg Oral BID    aluminum & magnesium hydroxide-simethicone (MAALOX) 200-200-20 MG/5ML suspension 15 mL  15 mL Oral Q6H PRN    diphenhydrAMINE (BENADRYL) capsule 25 mg  25 mg Oral Q6H PRN    Or    diphenhydrAMINE (BENADRYL) injection 25 mg  25 mg IntraVENous Q6H PRN    [START ON 9/7/2022] celecoxib (CELEBREX) capsule 200 mg  200 mg Oral Daily    albuterol (PROVENTIL) nebulizer solution 2.5 mg  2.5 mg Nebulization Q6H PRN       Objective:   Patient Vitals for the past 24 hrs:   Temp Pulse Resp BP SpO2   09/06/22 1407 -- 63 18 (!) 170/71 98 %   09/06/22 1340 -- 79 -- (!) 119/54 96 %   09/06/22 1335 -- 78 (P) 18 (!) 140/64 94 %   09/06/22 1330 -- 78 18 137/60 93 %   09/06/22 1325 -- 79 -- (!) 148/56 91 %   09/06/22 1320 -- 80 18 135/64 91 %   09/06/22 1315 -- 79 18 137/63 94 %   09/06/22 1310 -- 79 -- 139/66 96 %   09/06/22 1305 -- 78 16 130/67 96 %   09/06/22 1300 -- 76 16 135/71 96 %   09/06/22 1259 98 °F (36.7 °C) 75 12 135/71 96 %   09/06/22 1043 -- 74 16 (!) 160/72 98 %   09/06/22 1021 -- 73 16 (!) 169/79 97 %   09/06/22 1016 -- 75 16 (!) 173/79 99 %   09/06/22 1011 -- 74 16 (!) 167/79 97 %   09/06/22 1006 -- 74 16 (!) 165/79 97 %   09/06/22 1001 -- 76 16 (!) 170/81 98 %   09/06/22 0958 -- 79 16 (!) 166/81 98 %   09/06/22 0745 98.1 °F (36.7 °C) 81 16 (!) 186/90 97 %       Oxygen Therapy  SpO2: 98 %  Pulse via Oximetry: 79 beats per minute  Pulse Oximeter Device Mode: Continuous  Pulse Oximeter Device Location: Finger, Right  O2 Device: Nasal cannula  O2 Flow Rate (L/min): 4 L/min  O2 Delivery Method: Nasal cannula    Estimated body mass index is 40.46 kg/m² as calculated from the following:    Height as of this encounter: 5' 2\" (1.575 m). Weight as of this encounter: 221 lb 3.2 oz (100.3 kg). Intake/Output Summary (Last 24 hours) at 9/6/2022 1512  Last data filed at 9/6/2022 1250  Gross per 24 hour   Intake 1500 ml   Output 150 ml   Net 1350 ml         Physical Exam:  Blood pressure (!) 170/71, pulse 63, temperature 98 °F (36.7 °C), temperature source Temporal, resp. rate 18, height 5' 2\" (1.575 m), weight 221 lb 3.2 oz (100.3 kg), SpO2 98 %. General:    Well nourished. Head:  Normocephalic, atraumatic  Eyes:  Sclerae appear normal.  Pupils equally round.   ENT:  Nares appear normal, no drainage. Moist oral mucosa  Neck:  No restricted ROM. Trachea midline   CV:   RRR. No m/r/g. No jugular venous distension. Lungs:   CTAB. No wheezing, rhonchi, or rales. Symmetric expansion. Abdomen: Bowel sounds present. Soft, nontender, nondistended. Extremities: No cyanosis or clubbing. No edema, left knee dressing dry/intact. Skin:     No rashes and normal coloration. Warm and dry. Neuro:  CN II-XII grossly intact. Sensation intact. A&Ox3  Psych:  Normal mood and affect. I have personally reviewed labs and tests showing:  Recent Labs:  No results found for this or any previous visit (from the past 24 hour(s)). I have personally reviewed imaging studies showing:  No results found. Echocardiogram:  No results found for this or any previous visit.         Signed:  Claude Pouch, APRN - [facility]

## 2022-09-06 NOTE — INTERVAL H&P NOTE
Update History & Physical    The patient's History and Physical of September 2, 22 was reviewed with the patient and I examined the patient. There was no change. The surgical site was confirmed by the patient and me. Plan: The risks, benefits, expected outcome, and alternative to the recommended procedure have been discussed with the patient. Patient understands and wants to proceed with the procedure.      Electronically signed by JENNIFER Fleming on 9/6/2022 at 10:21 AM

## 2022-09-06 NOTE — ANESTHESIA PROCEDURE NOTES
Spinal Block    Patient location during procedure: OR  End time: 9/6/2022 11:02 AM  Reason for block: primary anesthetic  Staffing  Performed: anesthesiologist   Anesthesiologist: Grace Raymond MD  Spinal Block  Patient position: sitting  Prep: ChloraPrep  Patient monitoring: cardiac monitor, continuous pulse ox, frequent blood pressure checks and oxygen  Approach: midline  Location: L3/L4  Provider prep: mask and sterile gloves  Local infiltration: lidocaine  Needle  Needle type: Quincke   Needle gauge: 22 G  Assessment  CSF: clear  Attempts: 3+  Hemodynamics: stable  Additional Notes  First attempt anette, second spinocan, third 22g quincke.    Preanesthetic Checklist  Completed: patient identified, IV checked, risks and benefits discussed, surgical/procedural consents, equipment checked, pre-op evaluation, timeout performed, anesthesia consent given, oxygen available and monitors applied/VS acknowledged

## 2022-09-06 NOTE — PERIOP NOTE
PT'S SISTER JORGE L MAY BE REACHED -[phone]. PT'S BELONGINGS X2 BAGS LABELED AND PLACED IN LOCKER ROOM.

## 2022-09-06 NOTE — PROGRESS NOTES
1400 Patient oriented to room. Ambulate to bathroom with assistance with walker. SCDs on when returned to bed. Ice on knee. Gripper socks on    1748 Patient walked to bathroom with assistance and walker. Ambulated back to the bed.

## 2022-09-06 NOTE — CARE COORDINATION
Patient is a 69y.o. year old female admitted for Left TKA . Patient plans to return home on discharge. Order received to arrange home health. Patient without preference towards agency. Referral sent to [facility]. Patient requesting we arrange a walker and bedside commode. Referral sent to [address] [place], [place] [address] delivered to the hospital room prior to discharge. Will follow until discharge. 09/06/22 7897   Service Assessment   Patient Orientation Alert and Oriented   History Provided By Patient   Primary Caregiver Self   Community Resources Other (Comment)   Services At/After Discharge   Transition of Care Consult (CM Consult) [facility];PT   Condition of Participation: Discharge Planning   The Plan for Transition of Care is related to the following treatment goals: improve mobility   The Patient and/or Patient Representative was provided with a Choice of Provider? Patient   The Patient and/Or Patient Representative agree with the Discharge Plan? Yes   Freedom of Choice list was provided with basic dialogue that supports the patient's individualized plan of care/goals, treatment preferences, and shares the quality data associated with the providers?   Yes

## 2022-09-07 ENCOUNTER — TELEPHONE (OUTPATIENT)
Dept: ORTHOPEDIC SURGERY | Age: 67
End: 2022-09-07

## 2022-09-07 VITALS
BODY MASS INDEX: 40.7 KG/M2 | TEMPERATURE: 98.1 F | DIASTOLIC BLOOD PRESSURE: 61 MMHG | WEIGHT: 221.2 LBS | OXYGEN SATURATION: 93 % | HEART RATE: 86 BPM | HEIGHT: 62 IN | SYSTOLIC BLOOD PRESSURE: 137 MMHG | RESPIRATION RATE: 18 BRPM

## 2022-09-07 PROCEDURE — 97535 SELF CARE MNGMENT TRAINING: CPT

## 2022-09-07 PROCEDURE — 6370000000 HC RX 637 (ALT 250 FOR IP): Performed by: PHYSICIAN ASSISTANT

## 2022-09-07 PROCEDURE — 2500000003 HC RX 250 WO HCPCS: Performed by: PHYSICIAN ASSISTANT

## 2022-09-07 PROCEDURE — 97530 THERAPEUTIC ACTIVITIES: CPT

## 2022-09-07 PROCEDURE — 6360000002 HC RX W HCPCS: Performed by: PHYSICIAN ASSISTANT

## 2022-09-07 RX ORDER — ASPIRIN 81 MG/1
81 TABLET ORAL 2 TIMES DAILY
Qty: 60 TABLET | Refills: 0 | Status: SHIPPED | OUTPATIENT
Start: 2022-09-07 | End: 2022-10-07

## 2022-09-07 RX ORDER — OXYCODONE HYDROCHLORIDE 5 MG/1
5-10 TABLET ORAL EVERY 4 HOURS PRN
Qty: 60 TABLET | Refills: 0 | Status: SHIPPED | OUTPATIENT
Start: 2022-09-07 | End: 2022-09-14

## 2022-09-07 RX ADMIN — CARVEDILOL 6.25 MG: 6.25 TABLET, FILM COATED ORAL at 08:22

## 2022-09-07 RX ADMIN — CELECOXIB 200 MG: 200 CAPSULE ORAL at 08:22

## 2022-09-07 RX ADMIN — CEFAZOLIN 2000 MG: 10 INJECTION, POWDER, FOR SOLUTION INTRAVENOUS at 05:26

## 2022-09-07 RX ADMIN — ACETAMINOPHEN 650 MG: 325 TABLET, FILM COATED ORAL at 05:24

## 2022-09-07 RX ADMIN — ACETAMINOPHEN 650 MG: 325 TABLET, FILM COATED ORAL at 00:08

## 2022-09-07 RX ADMIN — HYDROCHLOROTHIAZIDE 25 MG: 25 TABLET ORAL at 08:22

## 2022-09-07 RX ADMIN — OXYCODONE 10 MG: 5 TABLET ORAL at 00:08

## 2022-09-07 RX ADMIN — ASPIRIN 81 MG: 81 TABLET, COATED ORAL at 08:22

## 2022-09-07 RX ADMIN — POTASSIUM CHLORIDE 10 MEQ: 10 TABLET, EXTENDED RELEASE ORAL at 08:22

## 2022-09-07 RX ADMIN — OXYCODONE 10 MG: 5 TABLET ORAL at 05:25

## 2022-09-07 RX ADMIN — SENNOSIDES AND DOCUSATE SODIUM 1 TABLET: 50; 8.6 TABLET ORAL at 08:22

## 2022-09-07 RX ADMIN — DULOXETINE HYDROCHLORIDE 60 MG: 60 CAPSULE, DELAYED RELEASE ORAL at 08:22

## 2022-09-07 RX ADMIN — AMLODIPINE BESYLATE 10 MG: 10 TABLET ORAL at 08:22

## 2022-09-07 ASSESSMENT — PAIN DESCRIPTION - LOCATION: LOCATION: KNEE

## 2022-09-07 ASSESSMENT — PAIN DESCRIPTION - DESCRIPTORS: DESCRIPTORS: ACHING

## 2022-09-07 ASSESSMENT — PAIN DESCRIPTION - ORIENTATION: ORIENTATION: LEFT

## 2022-09-07 ASSESSMENT — PAIN SCALES - GENERAL
PAINLEVEL_OUTOF10: 5
PAINLEVEL_OUTOF10: 3

## 2022-09-07 NOTE — PROGRESS NOTES
OCCUPATIONAL THERAPY Daily Note, Discharge, and AM      (Link to Caseload Tracking:    OT Orders   Time  OT Charge Capture  Rehab Caseload Tracker  Episode     Landen Contreras is a 69 y.o. female   PRIMARY DIAGNOSIS: Arthritis of left knee  Unilateral primary osteoarthritis, left knee [M17.12]  Arthritis of left knee [M17.12]  Procedure(s) (LRB):  ROBOTIC ASSISTED LEFT KNEE TOTAL ARTHROPLASTY (Left)  1 Day Post-Op  Reason for Referral: Pain in Left Knee (M25.562)  Stiffness of Left Knee, Not elsewhere classified (T58.444)  Inpatient: Payor: Paula Carnes / Plan: [employer]MO / Product Type: *No Product type* /     ASSESSMENT:     REHAB RECOMMENDATIONS:   Recommendation to date pending progress:  Setting:  Home Health Therapy    Equipment:    3 in 1 Bedside Commode  Rolling Walker     ASSESSMENT:  Ms. Sergio Aguayo is s/p left TKA and presents with decreased independence with functional mobility and activities of daily living as compared to baseline level of function and safety. Patient would benefit from skilled Occupational Therapy to maximize independence and safety with self-care task and functional mobility. Patient up to edge of bed declined dressing but donned a second gown as a robe at edge of bed with stand by assist assist.  Mobilized from hospital bed to hallway using a rolling walker with assist. Patient is hopeful to spend the night to better prepare for safe discharge home. Pt should do well tomorrow and will go home with support from sister. 9/7/2022  Ms. Sergio Aguayo is s/p left TKA. Patient completed shower and dressing as charted below in ADL grid and is ambulating with rolling walker and stand by assist.  Patient has met 4/4 goals and plans to return home with good family support. Family able to provide patient with appropriate level of assistance at this time. OT reviewed safety precautions throughout session and therapy schedule for the remainder of today.   Patient instructed to call for assistance when needing to get up from recliner and all needs in reach. Patient verbalized understanding of call light. Pt's daughter present and assisted with self care as needed. Pt will discharge with daughter and should do well.       [address]\"" [address] [address] AM-PAC 6 Clicks Daily Activity Inpatient Short Form:    AM-PAC Daily Activity Inpatient   How much help for putting on and taking off regular lower body clothing?: A Little  How much help for Bathing?: A Little  How much help for Toileting?: A Little  How much help for putting on and taking off regular upper body clothing?: None  How much help for taking care of personal grooming?: None  How much help for eating meals?: None  AM-PAC Inpatient Daily Activity Raw Score: 21  AM-PAC Inpatient ADL T-Scale Score : 44.27  ADL Inpatient CMS 0-100% Score: 32.79  ADL Inpatient CMS G-Code Modifier : CJ     SUBJECTIVE:     Ms. Ruben Jhaveri states she is ready for a shower        Social/Functional   Lives With: Family  Type of Home: House  Home Layout: One level  Home Access: Stairs to enter with rails  Entrance Stairs - Number of Steps: 1  Entrance Stairs - Rails: Left  Bathroom Shower/Tub: Walk-in shower  Bathroom Equipment: Built-in shower seat, Grab bars in shower  Home Equipment: Cane    OBJECTIVE:     Faheem Banker / Genesis Hidden / Tyson Magdaleno: None    RESTRICTIONS/PRECAUTIONS:  Restrictions/Precautions: Weight Bearing  Left Lower Extremity Weight Bearing: Weight Bearing As Tolerated    PAIN: VITALS / O2:   Pre Treatment:          Post Treatment: none Vitals          Oxygen        GROSS EVALUATION: INTACT IMPAIRED   (See Comments)   UE AROM [x] []   UE PROM [x] []   Strength [x]       Posture / Balance []  Slightly decreased standing balance    Sensation [x]     Coordination [x]       Tone [x]       Edema []    Activity Tolerance [x]       Hand Dominance R [] L []      COGNITION/  PERCEPTION: INTACT IMPAIRED   (See Comments)   Orientation [x]     Vision [x]     Hearing [x] Cognition  [x]     Perception [x]       MOBILITY: I Mod I S SBA CGA Min Mod Max Total  NT x2 Comments:   Bed Mobility    Rolling [] [] [] [] [] [] [] [] [] [] []    Supine to Sit [] [] [] [x] [] [] [] [] [] [] []    Scooting [] [] [] [x] [] [] [] [] [] [] []    Sit to Supine [] [] [] [] [] [] [] [] [] [] []    Transfers    Sit to Stand [] [] [] [x] [] [] [] [] [] [] []    Bed to Chair [] [] [] [x] [] [] [] [] [] [] []    Stand to Sit [] [] [] [x] [] [] [] [] [] [] []    Tub/Shower [] [] [] [x] [] [] [] [] [] [] []       Toilet [] [] [] [x] [] [] [] [] [] [] []        [] [] [] [] [] [] [] [] [] [] []    I=Independent, Mod I=Modified Independent, S=Supervision/Setup, SBA=Standby Assistance, CGA=Contact Guard Assistance, Min=Minimal Assistance, Mod=Moderate Assistance, Max=Maximal Assistance, Total=Total Assistance, NT=Not Tested    ACTIVITIES OF DAILY LIVING: I Mod I S SBA CGA Min Mod Max Total NT Comments   BASIC ADLs:              Upper Body Bathing [] [] [] [x] [] [] [] [] [] []    Lower Body Bathing [] [] [] [x] [x] [] [] [] [] []    Toileting [] [] [] [x] [] [] [] [] [] []    Upper Body Dressing [] [] [] [x] [] [] [] [] [] []    Lower Body Dressing [] [] [] [x] [] [] [] [] [] []    Feeding [x] [] [] [] [] [] [] [] [] []    Grooming [] [] [] [x] [] [] [] [] [] []    Personal Device Care [] [] [] [] [] [] [] [] [] []    Functional Mobility [] [] [] [x] [] [] [] [] [] []    I=Independent, Mod I=Modified Independent, S=Supervision/Setup, SBA=Standby Assistance, CGA=Contact Guard Assistance, Min=Minimal Assistance, Mod=Moderate Assistance, Max=Maximal Assistance, Total=Total Assistance, NT=Not Tested    PLAN:     FREQUENCY/DURATION   OT Plan of Care: 1 time/week for duration of hospital stay or until stated goals are met, whichever comes first.    ACUTE OCCUPATIONAL THERAPY GOALS:   (Developed with and agreed upon by patient and/or caregiver.)    GOALS:   DISCHARGE GOALS (in preparation for going home/rehab):  3 days  1. Ms. Tawnya Muniz will perform lower body dressing activity with stand by assist required to demonstrate improved functional mobility and safety. 2.  Ms. Tawnya Muniz will perform bathing activity with stand by assist required to demonstrate improved functional mobility and safety. 3.  Ms. Tawnya Muniz will perform toileting activity with  stand by assist to demonstrate improved functional mobility and safety. 4.  Ms. Tawnya Muniz will perform all functional transfers transfer with stand by assist to demonstrate improved functional mobility and safety. PROBLEM LIST:   (Skilled intervention is medically necessary to address:)  Decreased ADL/Functional Activities  Decreased Balance  Increased Pain   INTERVENTIONS PLANNED:   (Benefits and precautions of occupational therapy have been discussed with the patient.)  Self Care Training  Education         TREATMENT:       TREATMENT:   Self Care: (60 min min): Procedure(s) (per grid) utilized to improve and/or restore self-care/home management as related to dressing, bathing, toileting, grooming, and functional mobility . Required min verbal cueing to facilitate activities of daily living skills and compensatory activities.       AFTER TREATMENT PRECAUTIONS: Bed/Chair Locked, Call light within reach, Chair, Needs within reach, and Visitors at bedside    INTERDISCIPLINARY COLLABORATION:  RN/ PCT, PT/ PTA, and OT/ MERINO    EDUCATION:  Education Given To: Patient, Family  Education Provided: Role of Therapy, Plan of Care, Transfer Training, Equipment, Fall Prevention Strategies  [] Adrienne And Brandon Alonzo  [x] Fall Precautions  [] Hip Precautions  [x] D/C Instruction Review [] Prosthesis Review  [x] Walker Management/Safety  [x] [employer] Equipment as Needed  [x] Therapeutic Resting Position of Joint       TOTAL TREATMENT DURATION AND TIME:  Time In: 0910  Time Out: [address]  Minutes: [address] [facility],

## 2022-09-07 NOTE — PLAN OF CARE
Problem: Pain  Goal: Verbalizes/displays adequate comfort level or baseline comfort level  Outcome: Progressing     Problem: Safety - Adult  Goal: Free from fall injury  Outcome: Progressing     Problem: Discharge Planning  Goal: Discharge to home or other facility with appropriate resources  Outcome: Progressing     Problem: ABCDS Injury Assessment  Goal: Absence of physical injury  Outcome: Progressing

## 2022-09-07 NOTE — DISCHARGE INSTRUCTIONS
[facility]      Patient Discharge Instructions    Azeb Yeung / [id number] : 1955    Admitted 2022 Discharged: 2022     IF YOU HAVE ANY PROBLEMS ONCE YOU ARE AT HOME CALL THE FOLLOWING NUMBERS:   Main office number: [phone]      Medications    The medications you are to continue on are listed on the medication reconciliation sheet. Narcotic pain medications as well as supplemental iron can cause constipation. If this occurs try stopping the narcotic pain medication and/or the iron. It is important that you take the medication exactly as they are prescribed. Medications which increase your risk of blood clots are listed to stop for 5 weeks after surgery as well as medications or supplements which increase your risk of bleeding complications. Keep your medication in the bottles provided by the pharmacist and keep a list of the medication names, dosages, and times to be taken in your wallet. Do not take other medications without consulting your doctor. Important Information    Do NOT smoke as this will greatly increase your risk of infection! Resume your prehospital diet. If you have excessive nausea or vomitting call your doctor's office     Leg swelling and warmth is normal for 6 months after surgery. If you experience swelling in your leg elevate you leg while laying down with your toes above your heart. If you have sudden onset severe swelling with leg pain call our office. Use Isreal Hose stockings until we see you in the office for your follow up appointment. The stitches deep inside take approximately 6 months to dissolve. There will be sharp shooting, stinging and burning pain. This is normal and will resolve between 3-6 months after surgery. Difficulty sleeping is normal following total Knee and Hip replacement. You may try melatonin, an over-the-counter sleep aid or benadryl to help with sleep.  Most patients will resume sleeping through the night 8 weeks after surgery. Home Physical Therapy is arranged. Home Health will contact you within 48 hrs of discharge that you have chosen. If you have not received a call within this time frame please contact that provider you chose. You should be given this information before you leave the hospital.     You are at a risk for falls. Use the rolling walker when walking. Patients who have had a joint replacement should not drive if they are still taking narcotic pain mediation during the daytime hours. Most patients wean themselves off of pain medication within 2-5 weeks after surgery. When to Call the office    - If you have a temperature greater then 101  - Uncontrolled vomiting   - Loose control of your bladder or bowel function  - Are unable to bear any wieght   - Need a pain medication refill          Total Knee Replacement: What to Expect at  [address] had a total knee replacement. The doctor replaced the worn ends of the bones that connect to your knee (thighbone and lower leg bone) with plastic andmetal parts. When you leave the hospital, you should be able to move around with a walker or crutches. But you will need someone to help you at home until you have moreenergy and can move around better. You will go home with a bandage and stitches, staples, skin glue, or tape strips. Change the bandage as your doctor tells you to. If you have stitches or staples, your doctor will remove them about 2 weeks after your surgery. Glue or tape strips will fall off on their own over time. You may still have some mildpain, and the area may be swollen for a few months after surgery. Your knee will continue to improve for up to a year. You will probably use a walker for some time after surgery. When you are ready, you can use a cane. You may be able to walk without support after a couple weeks, or when you arecomfortable.   You will need to do months of physical rehabilitation (rehab) after a knee replacement. Rehab will help you strengthen the muscles of the knee and help you regain movement. After you recover, your artificial knee will allow you to do normal daily activities with less pain or no pain at all. You may be able to hike, dance, or ride a bike. Talk to your doctor about whether you can do more strenuous activities. Always tell your caregivers that you have an artificialknee. How long it will take to walk on your own, return to normal activities, and go back to work depends on your health and how well your rehabilitation (rehab) program goes. The better you do with your rehab exercises, the quicker you willget your strength and movement back. This care sheet gives you a general idea about how long it will take for you to recover. But each person recovers at a different pace. Follow the steps belowto get better as quickly as possible. How can you care for yourself at home? Activity    Rest when you feel tired. You may take a nap, but don't stay in bed all day. When you sit, use a chair with arms. You can use the arms to help you stand up. Work with your physical therapist to find the best way to exercise. What you can do as your knee heals will depend on whether your new knee is cemented or uncemented. You may not be able to do certain things for a while if your new knee is uncemented. After your knee has healed enough, you can do more strenuous activities with caution. You can golf, but you may want to use a golf cart for some time. And don't wear shoes with spikes. You can bike on a flat road or on a stationary bike. Talk to your doctor before biking uphill. Your doctor may suggest that you stay away from activities that put stress on your knee. These include tennis, badminton, contact sports like football, jumping (such as in basketball), jogging, and running. Avoid activities where you might fall. Do not sit for more than 1 hour at a time.  Get up and walk around for a while before you sit again. If you must sit for a long time, prop up your leg with a chair or footstool. This will help you avoid swelling. Ask your doctor when you can drive again. It may take several weeks after knee replacement surgery before it's safe for you to drive. When you get into a car, sit on the edge of the seat. Then pull in your legs, and turn to face the front. You should be able to do many everyday activities 3 to 6 weeks after your surgery. You will probably need to take 4 to 16 weeks off from work. When you can go back to work depends on the type of work you do and how you feel. Ask your doctor when it is okay for you to have sex. For 12 weeks, do not lift anything heavier than 10 pounds and do not lift weights. Diet    By the time you leave the hospital, you should be eating your normal diet. If your stomach is upset, try bland, low-fat foods like plain rice, broiled chicken, toast, and yogurt. Your doctor may suggest that you take iron and vitamin supplements. Drink plenty of fluids (unless your doctor tells you not to). Eat healthy foods, and watch your portion sizes. Try to stay at your ideal weight. Too much weight puts more stress on your new knee. You may notice that your bowel movements are not regular right after your surgery. This is common. Try to avoid constipation and straining with bowel movements. Drinking enough fluids, taking a stool softener, and eating foods that are good sources of fiber can help you avoid constipation. If you have not had a bowel movement after a couple of days, talk to your doctor. Medicines    Your doctor will tell you if and when you can restart your medicines. You will also get instructions about taking any new medicines. If you take aspirin or some other blood thinner, ask your doctor if and when to start taking it again. Make sure that you understand exactly what your doctor wants you to do.      Your doctor may give you a blood-thinning medicine to prevent blood clots. If you take a blood thinner, be sure you get instructions about how to take your medicine safely. Blood thinners can cause serious bleeding problems. This medicine could be in pill form or as a shot (injection). If a shot is needed, your doctor will tell you how to do this. Be safe with medicines. Take pain medicines exactly as directed. If the doctor gave you a prescription medicine for pain, take it as prescribed. If you are not taking a prescription pain medicine, ask your doctor if you can take an over-the-counter medicine. Plan to take your pain medicine 30 minutes before exercises. It is easier to prevent pain before it starts than to stop it after it has started. If you think your pain medicine is making you sick to your stomach: Take your medicine after meals (unless your doctor has told you not to). Ask your doctor for a different pain medicine. If your doctor prescribed antibiotics, take them as directed. Do not stop taking them just because you feel better. You need to take the full course of antibiotics. Incision care    If your doctor told you how to care for your cut (incision), follow your doctor's instructions. You will have a dressing over the cut. A dressing helps the incision heal and protects it. Your doctor will tell you how to take care of this. If you did not get instructions, follow this general advice: If you have strips of tape on the cut the doctor made, leave the tape on for a week or until it falls off. If you have stitches or staples, your doctor will tell you when to come back to have them removed. If you have skin glue on the cut, leave it on until it falls off. Skin glue is also called skin adhesive or liquid stitches. Change the bandage every day. Wash the area daily with warm water, and pat it dry. Don't use hydrogen peroxide or alcohol. They can slow healing.   You may cover the area with a gauze bandage if it oozes fluid or rubs against clothing. You may shower 24 to 48 hours after surgery. Pat the incision dry. Don't swim or take a bath for the first 2 weeks, or until your doctor tells you it is okay. Exercise    Your rehab program will give you a number of exercises to do to help you get back your knee's range of motion and strength. Always do them as your therapist tells you. Ice    For pain and swelling, put ice or a cold pack on the area for 10 to 20 minutes at a time. Put a thin cloth between the ice and your skin. If your doctor recommended cold therapy using a portable machine, follow the instructions that came with the machine. Other instructions    Wear compression stockings if your doctor told you to. These may help to prevent blood clots. Your doctor will tell you how long you need to keep wearing the compression stockings. Carry a medical alert card that says you have an artificial joint. You have metal pieces in your knee. These may set off some airport metal detectors. Follow-up care is a key part of your treatment and safety. Be sure to make and go to all appointments, and call your doctor if you are having problems. It's also a good idea to know your test results and keep alist of the medicines you take. When should you call for help? Call 911 anytime you think you may need emergency care. For example, call if:    You passed out (lost consciousness). You have severe trouble breathing. You have sudden chest pain and shortness of breath, or you cough up blood. Call your doctor now or seek immediate medical care if:    You have signs of infection, such as: Increased pain, swelling, warmth, or redness. Red streaks leading from the incision. Pus draining from the incision. A fever. You have signs of a blood clot, such as:  Pain in your calf, back of the knee, thigh, or groin. Redness and swelling in your leg or groin.      Your incision comes open and begins to bleed, or the bleeding increases. You have pain that does not get better after you take pain medicine. Watch closely for changes in your health, and be sure to contact your doctor if:    You do not have a bowel movement after taking a laxative. Where can you learn more? Go to [email or web address]. org and sign in to your [employer] account. Enter P316 in the [facility] box to learn more about \"Total Knee Replacement: What to Expect at Home. \"     If you do not have an account, please click on the \"Sign Up Now\" link. Current as of: March 9, 2022               Content Version: 13.3  © 1641-3743 [employer], [place]. Care instructions adapted under license by [facility] ([facility]). If you have questions about a medical condition or this instruction, always ask your healthcare professional. [place] [address] any warranty or liability for your use of this information. Information obtained by :  I understand that if any problems occur once I am at home I am to contact my physician. I understand and acknowledge receipt of the instructions indicated above.                                                                                                                                            Physician's or R.N.'s Signature                                                                  Date/Time                                                                                                                                              Patient or Representative Signature                                                          Date/Time

## 2022-09-07 NOTE — PROGRESS NOTES
Had therapy. Prescriptions were sent to pharmacy. Home health arranged for therapy. Has follow up appt scheduled with Dr Craig Asher. Instructed to call doctor if having fever or excessive drainage. Discharge instructions given. Going to car via wheelchair.

## 2022-09-07 NOTE — PROGRESS NOTES
Name: Alan Fam  YOB: 1955  Gender: female  MRN: [id number]           2022         Post Op day: 1 Day Post-Op     Admit Date: 2022  Admit Diagnosis: Unilateral primary osteoarthritis, left knee [M17.12]  Arthritis of left knee [M17.12]  Procedure: Procedure(s) (LRB):  ROBOTIC ASSISTED LEFT KNEE TOTAL ARTHROPLASTY (Left)     LAB:    No results found for this or any previous visit (from the past 24 hour(s)). Vital Signs:    Patient Vitals for the past 8 hrs:   BP Temp Temp src Pulse Resp SpO2   22 0243 118/69 98 °F (36.7 °C) Temporal 84 18 95 %     Temp (24hrs), Av °F (36.7 °C), Min:97.8 °F (36.6 °C), Max:98.1 °F (36.7 °C)    Body mass index is 40.46 kg/m².   Pain Control:        Subjective: Awake and alert     Objective: Vital Signs are Stable, No Acute Distress, Alert and Oriented, Dressing is Dry, Calves soft, Neurovascular exam is normal.        PT/OT:     Ambulation/Gait (if applicable):   Ambulation  Distance: 85      Weight Bearing Status: WBAT    Meds:  amLODIPine, 10 mg, Daily  carvedilol, 6.25 mg, BID WC  DULoxetine, 60 mg, BID  fluticasone, 2 spray, Daily  gabapentin, 400 mg, Nightly  hydroCHLOROthiazide, 25 mg, Daily  linaclotide, 145 mcg, QAM AC  potassium chloride, 10 mEq, BID  QUEtiapine, 25 mg, Nightly  rosuvastatin, 10 mg, Nightly  sodium chloride flush, 5-40 mL, 2 times per day  acetaminophen, 650 mg, Q6H  sennosides-docusate sodium, 1 tablet, BID  aspirin, 81 mg, BID  celecoxib, 200 mg, Daily      sodium chloride  sodium chloride      albuterol sulfate HFA, 1 puff, Q4H PRN  LORazepam, 1 mg, BID PRN  sodium chloride flush, 5-40 mL, PRN  sodium chloride, , PRN  oxyCODONE, 5 mg, Q4H PRN   Or  oxyCODONE, 10 mg, Q4H PRN  HYDROmorphone, 0.5 mg, Q3H PRN   Or  HYDROmorphone, 1 mg, Q3H PRN  promethazine, 25 mg, Q6H PRN   Or  ondansetron, 4 mg, Q6H PRN  aluminum & magnesium hydroxide-simethicone, 15 mL, Q6H PRN  diphenhydramine, 25 mg, Q6H PRN

## 2022-09-07 NOTE — ANESTHESIA POSTPROCEDURE EVALUATION
Department of Anesthesiology  Postprocedure Note    Patient: Beata Giles  MRN: [id number]  YOB: 1955  Date of evaluation: 9/7/2022      Procedure Summary     Date: 09/06/22 Room / [facility]  / [facility]    Anesthesia Start: 2276 Anesthesia Stop: 1300    Procedure: ROBOTIC ASSISTED LEFT KNEE TOTAL ARTHROPLASTY (Left: Knee) Diagnosis:       Unilateral primary osteoarthritis, left knee      (Unilateral primary osteoarthritis, left knee [M17.12])    Surgeons: Kailyn Vallejo MD Responsible Provider: Glen Marquez MD    Anesthesia Type: spinal ASA Status: 3          Anesthesia Type: No value filed.     Basim Phase I: Basim Score: 10    Basim Phase II:        Anesthesia Post Evaluation    Patient location during evaluation: bedside  Patient participation: complete - patient participated  Level of consciousness: awake and alert  Pain score: 1  Airway patency: patent  Nausea & Vomiting: no vomiting  Complications: no  Cardiovascular status: hemodynamically stable  Respiratory status: acceptable  Hydration status: euvolemic

## 2022-09-07 NOTE — PROGRESS NOTES
PHYSICAL THERAPY JOINT CAMP: TOTAL KNEE ARTHROPLASTY Daily Note and AM  (Link to Caseload Tracking: PT Visit Days : 2  Acknowledge Orders  Time In/Out  PT Charge Capture  Rehab Caseload Tracker  Episode   Tata Mckenzie is a 69 y.o. female   PRIMARY DIAGNOSIS: Arthritis of left knee  Unilateral primary osteoarthritis, left knee [M17.12]  Arthritis of left knee [M17.12]  Procedure(s) (LRB):  ROBOTIC ASSISTED LEFT KNEE TOTAL ARTHROPLASTY (Left)  1 Day Post-Op  Reason for Referral: Pain in Left Knee (M25.562)  Stiffness of Left Knee, Not elsewhere classified (M25.662)  Difficulty in walking, Not elsewhere classified (R26.2)  Inpatient: Payor: Xu Jang / Plan: [facility] HMO / Product Type: *No Product type* /     REHAB RECOMMENDATIONS:   Recommendation to date pending progress:  Setting:  Home Health Therapy    Equipment:    Rolling Walker     RANGE OF MOTION:   Left Knee Flexion:  95  Left Knee Extension:  5     GAIT: I Mod I S SBA CGA Min Mod Max Total  NT x2 Comments:   Level of Assistance [] [] [] [x] [] [] [] [] [] [] []            Weightbearing Status  Left Lower Extremity Weight Bearing: Weight Bearing As Tolerated    Distance  172 (+ 172) feet    Gait Quality Antalgic, Decreased adams , Decreased step clearance, Decreased step length, and Decreased stance    DME Rolling Walker     Stairs Stairs/Curb  Stairs?: Yes  Stairs  # Steps : 6  Rails: Bilateral    Ramp     I=Independent, Mod I=Modified Independent, S=Supervision, SBA=Standby Assistance, CGA=Contact Guard Assistance,   Min=Minimal Assistance, Mod=Moderate Assistance, Max=Maximal Assistance, Total=Total Assistance, NT=Not Tested    ASSESSMENT:   ASSESSMENT:  Ms. Carmen Dougherty presents with decreased strength and range of motion left lower extremity and with decreased independence with functional mobility s/p left total knee arthroplasty.  Pt will benefit from skilled PT interventions to maximize independence with functional mobility and TKA management. Pt did well with assessment and had already been up to the bathroom twice. 9/7 supine upon arrival.  Participated well with therapy. Performs and review L knee HEP  with good technique. Work on bed mobility as follows:supine>eob with SBA. Sit>stand with walker in front and SBA. Ambulated 172 ft down to the gym using RW with SBA. Therapist instructed/pt demonstrated going up/down the stairs with B rails and good technique. Rested few min. Then ambulated another 172 ft back to the room. Return to the eob with SBA. Therapist issued and review PT D/C sheet. Therapist explain the purpose for the ice is to help with swelling. Left sitting on the eob with needs in reach, ice to L knee and instructed to call for assists. All question answer and ready for D/C.      Outcome Measure:   KOOS-JR:   Jr ROBIN. Knee Survey Score: 13    SUBJECTIVE:   Ms. Ana Hsu states, \"I am leaving this morning\"    Home Environment/Prior Level of Function Lives With: Family  Type of Home: House  Home Layout: One level  Home Access: Stairs to enter with rails  Entrance Stairs - Number of Steps: 1  Entrance Stairs - Rails: Left  Bathroom Shower/Tub: Walk-in shower  Bathroom Equipment: Built-in shower seat, Grab bars in shower  Home Equipment: Cane    OBJECTIVE:     PAIN: VITAL SIGNS: LINES/DRAINS:   Pre Treatment: 2/10         Post Treatment: ice to L knee Vitals        Oxygen    None    RESTRICTIONS/PRECAUTIONS:  Restrictions/Precautions: Weight Bearing  Left Lower Extremity Weight Bearing: Weight Bearing As Tolerated        Restrictions/Precautions: Weight Bearing        LOWER EXTREMITY GROSS EVALUATION:  RIGHT LE   Within Functional Limits   Abnormal   Comments   Strength [] []     Range of Motion [] []        LEFT LE Within Functional Limits   Abnormal   Comments   Strength [] []     Range of Motion [] []       UPPER EXTREMITY GROSS EVALUATION:     Within Functional Limits   Abnormal   Comments   Strength [] [] Range of Motion [] []      SENSATION  Sensation []       COGNITION/  PERCEPTION: Intact Impaired (Comments):   Orientation []     Vision []     Hearing []     Cognition  []       MOBILITY: I Mod I S SBA CGA Min Mod Max Total  NT x2 Comments:   Bed Mobility    Rolling [] [] [] [x] [] [] [] [] [] [] []    Supine to Sit [] [] [] [x] [] [] [] [] [] [] []    Scooting [] [] [] [x] [] [] [] [] [] [] []    Sit to Supine [] [] [] [] [] [] [] [] [] [] [] On eob   Transfers    Sit to Stand [] [] [] [x] [] [] [] [] [] [] []    Bed to Chair [] [] [] [x] [] [] [] [] [] [] []    Stand to Sit [] [] [] [x] [] [] [] [] [] [] []    Stand Pivot [] [] [] [x] [] [] [] [] [] [] []    Toilet [] [] [] [] [] [] [] [] [] [] []     [] [] [] [] [] [] [] [] [] [] []    I=Independent, Mod I=Modified Independent, S=Supervision, SBA=Standby Assistance, CGA=Contact Guard Assistance,   Min=Minimal Assistance, Mod=Moderate Assistance, Max=Maximal Assistance, Total=Total Assistance, NT=Not Tested    BALANCE: Good Fair+ Fair Fair- Poor NT Comments   Sitting Static [] [x] [] [] [] []    Sitting Dynamic [] [] [] [] [] []              Standing Static [] [x] [] [] [] []    Standing Dynamic [] [] [] [] [] []      PLAN:   ACUTE PHYSICAL THERAPY GOALS:   (Developed with and agreed upon by patient and/or caregiver.)  GOALS (1-4 days):  (1.)Ms. Tawnya Muniz will move from supine to sit and sit to supine  in bed with SUPERVISION. (2.)Ms. Tawnya Muniz will transfer from bed to chair and chair to bed with STAND BY ASSIST using the least restrictive device. -GOAL MET 9/7/22    (3.)Ms. Tawnya Muniz will ambulate with STAND BY ASSIST for 300 feet with the least restrictive device. -GOAL MET 9/7/22    (4.)Ms. Tawnya Muniz will ambulate up/down 3 steps with bilateral  railing with CONTACT GUARD ASSIST.-GOAL MET 9/7/22    (5.)Ms. Tawnya Muniz will increase left knee ROM to 0-90°.  ________________________________________________________________________________________________    FREQUENCY AND DURATION: BID for duration of hospital stay or until stated goals are met, whichever comes first.    THERAPY PROGNOSIS: Good    PROBLEM LIST:   (Skilled intervention is medically necessary to address:)  Decreased ADL/Functional Activities, Decreased Activity Tolerance, Decreased AROM/PROM, Decreased Gait Ability, Decreased Strength, Decreased Transfer Abilities, and Increased Pain   INTERVENTIONS PLANNED:   (Benefits and precautions of physical therapy have been discussed with the patient.)  Therapeutic Activity, Therapeutic Exercise/HEP, Gait Training, Modalities, and Education       TREATMENT:   EVALUATION: LOW COMPLEXITY: (Untimed Charge)    TREATMENT:   Therapeutic Activity (40 Minutes): Therapeutic activity included Supine to Sit, Transfer Training, Ambulation on level ground, Sitting balance , and Standing balance to improve functional Activity tolerance, Mobility, Strength, and ROM.     TREATMENT GRID:  THERAPEUTIC  EXERCISES: DATE:  9/6 DATE:  9/7   DATE:      AM PM AM PM AM PM    [] [] [] [] [] []   Ankle Pumps  10 15      Quad Sets  10 15      Gluteal Sets  10 15      Hip Abd/ADduction  10 15      Straight Leg Raises  10 15      Knee Slides  10 15      Short Arc Quads   15      Chair Slides   15                        B = bilateral; AA = active assistive; A = active; P = passive      EDUCATION: Education Given To: Patient  Education Provided: Role of Therapy, Home Exercise Program  Education Method: Demonstration, Verbal  EDUCATION:  [x] Home Exercises  [x] Fall Precautions  [] No Pillow Under Knee  [] D/C Instruction Review [] Cryocuff  [] Walker Management/Safety  [] Adaptive Equipment as Needed     AFTER TREATMENT PRECAUTIONS: Bed/Chair Locked, Chair, Needs within reach, RN notified, and Visitors at bedside    INTERDISCIPLINARY COLLABORATION:  RN/ PCT    COMPLIANCE WITH PROGRAM/EXERCISE: compliant all of the time, Will assess as treatment progresses. RECOMMENDATIONS/INTENT FOR NEXT TREATMENT SESSION: Treatment next visit will focus on increasing Ms. Luke's independence with bed mobility, transfers, gait training, strength/ROM exercises, modalities for pain, and patient education.      TIME IN/OUT:  Time In: 0700  Time Out: 0740  Minutes: [facility], [facility]

## 2022-09-07 NOTE — DISCHARGE SUMMARY
[address] [place] [address]  Total Joint Discharge Summary      Patient ID:  Tata Mckenzie  [id number]  6 y.o.  1955    Admit date: 9/6/2022  Discharge date and time: 9-7-22  Admitting Physician: Lawanda Holden MD  Surgeon: Same  Admission Diagnoses: Unilateral primary osteoarthritis, left knee [M17.12]  Arthritis of left knee [M17.12]  Discharge Diagnoses: Principal Problem:    Arthritis of left knee  Active Problems:    Suspected sleep apnea    Hypertension    Recurrent major depressive disorder, in partial remission ([facility] [address]    Hyperlipemia  Resolved Problems:    * No resolved hospital problems. *                              Perioperative Antibiotics: Ancef 1 to 3 g was given depending on patient's weight.  If allergic to Ancef or due to other indications, patient was given Vancomycin/Gent per protocol      Hospital Medications given:   amLODIPine, 10 mg, Daily  carvedilol, 6.25 mg, BID WC  DULoxetine, 60 mg, BID  fluticasone, 2 spray, Daily  gabapentin, 400 mg, Nightly  hydroCHLOROthiazide, 25 mg, Daily  linaclotide, 145 mcg, QAM AC  potassium chloride, 10 mEq, BID  QUEtiapine, 25 mg, Nightly  rosuvastatin, 10 mg, Nightly  sodium chloride flush, 5-40 mL, 2 times per day  acetaminophen, 650 mg, Q6H  sennosides-docusate sodium, 1 tablet, BID  aspirin, 81 mg, BID  celecoxib, 200 mg, Daily      sodium chloride  sodium chloride      albuterol sulfate HFA, 1 puff, Q4H PRN  LORazepam, 1 mg, BID PRN  sodium chloride flush, 5-40 mL, PRN  sodium chloride, , PRN  oxyCODONE, 5 mg, Q4H PRN   Or  oxyCODONE, 10 mg, Q4H PRN  HYDROmorphone, 0.5 mg, Q3H PRN   Or  HYDROmorphone, 1 mg, Q3H PRN  promethazine, 25 mg, Q6H PRN   Or  ondansetron, 4 mg, Q6H PRN  aluminum & magnesium hydroxide-simethicone, 15 mL, Q6H PRN  diphenhydramine, 25 mg, Q6H PRN   Or  diphenhydrAMINE, 25 mg, Q6H PRN  albuterol, 2.5 mg, Q6H PRN        Discharge Medications given:  Current Discharge Medication List        START taking these medications Details   oxyCODONE (ROXICODONE) 5 MG immediate release tablet Take 1-2 tablets by mouth every 4 hours as needed for Pain for up to 7 days. Qty: 60 tablet, Refills: 0    Comments: Reduce doses taken as pain becomes manageable  Associated Diagnoses: Total knee replacement status, left           CONTINUE these medications which have CHANGED    Details   aspirin 81 MG EC tablet Take 1 tablet by mouth 2 times daily  Qty: 60 tablet, Refills: 0           CONTINUE these medications which have NOT CHANGED    Details   acetaminophen (TYLENOL) 500 MG tablet Take 1,000 mg by mouth every 6 hours as needed for Pain      LORazepam (ATIVAN) 1 MG tablet Take 1 mg by mouth 2 times daily as needed. linaclotide (LINZESS) 145 MCG capsule Take 145 mcg by mouth every morning (before breakfast)      albuterol sulfate  (90 Base) MCG/ACT inhaler Inhale 1 puff into the lungs every 4 hours as needed      amLODIPine (NORVASC) 10 MG tablet Take 10 mg by mouth daily      carvedilol (COREG) 6.25 MG tablet Take 6.25 mg by mouth 2 times daily (with meals)      DULoxetine (CYMBALTA) 60 MG extended release capsule Take 60 mg by mouth 2 times daily      fluticasone (FLONASE) 50 MCG/ACT nasal spray 1 to 2 sprays each nostril daily      gabapentin (NEURONTIN) 100 MG capsule Take 400 mg by mouth at bedtime.       hydroCHLOROthiazide (HYDRODIURIL) 25 MG tablet Take 25 mg by mouth daily      potassium chloride (KLOR-CON) 10 MEQ extended release tablet Take 10 mEq by mouth 2 times daily      QUEtiapine (SEROQUEL) 25 MG tablet Take 25 mg by mouth at bedtime      rosuvastatin (CRESTOR) 20 MG tablet Take 20 mg by mouth           STOP taking these medications       traMADol (ULTRAM) 50 MG tablet Comments:   Reason for Stopping:         Diclofenac Sodium 2 % SOLN Comments:   Reason for Stopping:         fenofibrate micronized (LOFIBRA) 200 MG capsule Comments:   Reason for Stopping:         Magnesium Oxide 500 MG TABS Comments:   Reason for Stopping:         meloxicam (MOBIC) 7.5 MG tablet Comments:   Reason for Stopping:         naloxone 4 MG/0.1ML LIQD nasal spray Comments:   Reason for Stopping:                Additional DVT Prophylaxis:  MAINE Hose,Plexi-Pulse    Postoperative transfusions:   none  Post Op complications: none    Hemoglobin at discharge:   Lab Results   Component Value Date/Time    HGB 13.5 08/23/2022 09:13 AM       Wound appears to be healing without any evidence of infection. Physical Therapy started on the day following surgery and progressed to independent ambulation with the aid of a walker. At the time of discharge, able to go up and down stairs and had understanding of precautions needed following surgery. PT/OT:         Ambulation Response:  Tolerated well                   Discharged to: home    Discharge instructions:  -Rx pain medication given  - Anticoagulate with: Ecotrin 81 mg PO BID x 4 weeks  -Resume pre hospital diet             -Resume home medications per medical continuation form     -Ambulate with walker, appropriate total joint protocol  -Follow up in office as scheduled       Signed:  JENNIFER Bliss  9/7/2022  8:34 AM

## 2022-09-07 NOTE — TELEPHONE ENCOUNTER
Ms. Andria Gross was discharged from the hospital today and said that her pharmacy has questions about the oxycodone prescription.   [facility] in [place] - [phone]

## 2022-09-08 ENCOUNTER — HOME CARE VISIT (OUTPATIENT)
Dept: SCHEDULING | Facility: HOME HEALTH | Age: 67
End: 2022-09-08
Payer: MEDICARE

## 2022-09-08 VITALS
SYSTOLIC BLOOD PRESSURE: 140 MMHG | RESPIRATION RATE: 16 BRPM | TEMPERATURE: 98.2 F | HEART RATE: 80 BPM | OXYGEN SATURATION: 98 % | DIASTOLIC BLOOD PRESSURE: 80 MMHG

## 2022-09-08 PROCEDURE — G0151 HHCP-SERV OF PT,EA 15 MIN: HCPCS

## 2022-09-08 PROCEDURE — 1090000001 HH PPS REVENUE CREDIT

## 2022-09-08 PROCEDURE — 400013 HH SOC

## 2022-09-08 PROCEDURE — 0221000100 HH NO PAY CLAIM PROCEDURE

## 2022-09-08 PROCEDURE — 1090000002 HH PPS REVENUE DEBIT

## 2022-09-08 ASSESSMENT — ENCOUNTER SYMPTOMS
HEMOPTYSIS: 0
PAIN LOCATION - PAIN QUALITY: THROBBING

## 2022-09-09 PROCEDURE — 1090000002 HH PPS REVENUE DEBIT

## 2022-09-09 PROCEDURE — 1090000001 HH PPS REVENUE CREDIT

## 2022-09-10 PROCEDURE — 1090000001 HH PPS REVENUE CREDIT

## 2022-09-10 PROCEDURE — 1090000002 HH PPS REVENUE DEBIT

## 2022-09-11 PROCEDURE — 1090000001 HH PPS REVENUE CREDIT

## 2022-09-11 PROCEDURE — 1090000002 HH PPS REVENUE DEBIT

## 2022-09-12 ENCOUNTER — HOME CARE VISIT (OUTPATIENT)
Dept: SCHEDULING | Facility: HOME HEALTH | Age: 67
End: 2022-09-12
Payer: MEDICARE

## 2022-09-12 VITALS
SYSTOLIC BLOOD PRESSURE: 148 MMHG | DIASTOLIC BLOOD PRESSURE: 90 MMHG | OXYGEN SATURATION: 97 % | RESPIRATION RATE: 16 BRPM | TEMPERATURE: 98.7 F | HEART RATE: 81 BPM

## 2022-09-12 PROCEDURE — 1090000002 HH PPS REVENUE DEBIT

## 2022-09-12 PROCEDURE — 1090000001 HH PPS REVENUE CREDIT

## 2022-09-12 PROCEDURE — G0151 HHCP-SERV OF PT,EA 15 MIN: HCPCS

## 2022-09-12 ASSESSMENT — ENCOUNTER SYMPTOMS: PAIN LOCATION - PAIN QUALITY: ACHE

## 2022-09-13 PROCEDURE — 1090000001 HH PPS REVENUE CREDIT

## 2022-09-13 PROCEDURE — 1090000002 HH PPS REVENUE DEBIT

## 2022-09-14 ENCOUNTER — POST-OP TELEPHONE (OUTPATIENT)
Dept: ORTHOPEDICS UNIT | Age: 67
End: 2022-09-14

## 2022-09-14 ENCOUNTER — HOME CARE VISIT (OUTPATIENT)
Dept: SCHEDULING | Facility: HOME HEALTH | Age: 67
End: 2022-09-14
Payer: MEDICARE

## 2022-09-14 VITALS
OXYGEN SATURATION: 95 % | SYSTOLIC BLOOD PRESSURE: 150 MMHG | RESPIRATION RATE: 16 BRPM | TEMPERATURE: 98.4 F | HEART RATE: 92 BPM | DIASTOLIC BLOOD PRESSURE: 80 MMHG

## 2022-09-14 PROCEDURE — 1090000002 HH PPS REVENUE DEBIT

## 2022-09-14 PROCEDURE — G0151 HHCP-SERV OF PT,EA 15 MIN: HCPCS

## 2022-09-14 PROCEDURE — 1090000001 HH PPS REVENUE CREDIT

## 2022-09-14 NOTE — TELEPHONE ENCOUNTER
Patient called navigator to report uncontrolled post op pain. Pain levels have remained around 8 or 9/10. Taking oxycodone 5 mg Q 4 hours PRN pain. Advised to take oxycodone 10 mg Q 4 hours PRN pain ( as ordered by surgeon). Advised how to alternate narcotic and ES tylenol. Verbalized understanding.

## 2022-09-15 DIAGNOSIS — M17.12 PRIMARY OSTEOARTHRITIS OF LEFT KNEE: Primary | ICD-10-CM

## 2022-09-15 PROCEDURE — 1090000002 HH PPS REVENUE DEBIT

## 2022-09-15 PROCEDURE — 1090000001 HH PPS REVENUE CREDIT

## 2022-09-16 ENCOUNTER — HOME CARE VISIT (OUTPATIENT)
Dept: SCHEDULING | Facility: HOME HEALTH | Age: 67
End: 2022-09-16
Payer: MEDICARE

## 2022-09-16 VITALS
OXYGEN SATURATION: 96 % | RESPIRATION RATE: 16 BRPM | SYSTOLIC BLOOD PRESSURE: 164 MMHG | HEART RATE: 69 BPM | TEMPERATURE: 98.1 F | DIASTOLIC BLOOD PRESSURE: 92 MMHG

## 2022-09-16 PROCEDURE — 1090000001 HH PPS REVENUE CREDIT

## 2022-09-16 PROCEDURE — G0151 HHCP-SERV OF PT,EA 15 MIN: HCPCS

## 2022-09-16 PROCEDURE — 1090000002 HH PPS REVENUE DEBIT

## 2022-09-16 RX ORDER — OXYCODONE HYDROCHLORIDE 5 MG/1
5-10 TABLET ORAL EVERY 4 HOURS PRN
Qty: 60 TABLET | Refills: 0 | Status: SHIPPED | OUTPATIENT
Start: 2022-09-16 | End: 2022-09-23

## 2022-09-16 ASSESSMENT — ENCOUNTER SYMPTOMS: PAIN LOCATION - PAIN QUALITY: ACHE

## 2022-09-17 PROCEDURE — 1090000001 HH PPS REVENUE CREDIT

## 2022-09-17 PROCEDURE — 1090000002 HH PPS REVENUE DEBIT

## 2022-09-18 PROCEDURE — 1090000001 HH PPS REVENUE CREDIT

## 2022-09-18 PROCEDURE — 1090000002 HH PPS REVENUE DEBIT

## 2022-09-19 ENCOUNTER — HOME CARE VISIT (OUTPATIENT)
Dept: SCHEDULING | Facility: HOME HEALTH | Age: 67
End: 2022-09-19
Payer: MEDICARE

## 2022-09-19 VITALS
SYSTOLIC BLOOD PRESSURE: 160 MMHG | RESPIRATION RATE: 16 BRPM | TEMPERATURE: 98.4 F | OXYGEN SATURATION: 94 % | DIASTOLIC BLOOD PRESSURE: 92 MMHG | HEART RATE: 96 BPM

## 2022-09-19 PROCEDURE — G0151 HHCP-SERV OF PT,EA 15 MIN: HCPCS

## 2022-09-19 PROCEDURE — 1090000001 HH PPS REVENUE CREDIT

## 2022-09-19 PROCEDURE — 1090000002 HH PPS REVENUE DEBIT

## 2022-09-19 ASSESSMENT — ENCOUNTER SYMPTOMS: PAIN LOCATION - PAIN QUALITY: ACHE

## 2022-09-20 PROCEDURE — 1090000001 HH PPS REVENUE CREDIT

## 2022-09-20 PROCEDURE — 1090000002 HH PPS REVENUE DEBIT

## 2022-09-21 ENCOUNTER — HOME CARE VISIT (OUTPATIENT)
Dept: SCHEDULING | Facility: HOME HEALTH | Age: 67
End: 2022-09-21
Payer: MEDICARE

## 2022-09-21 VITALS
RESPIRATION RATE: 17 BRPM | SYSTOLIC BLOOD PRESSURE: 140 MMHG | TEMPERATURE: 97.3 F | OXYGEN SATURATION: 94 % | DIASTOLIC BLOOD PRESSURE: 82 MMHG | HEART RATE: 73 BPM

## 2022-09-21 PROCEDURE — 1090000001 HH PPS REVENUE CREDIT

## 2022-09-21 PROCEDURE — 1090000002 HH PPS REVENUE DEBIT

## 2022-09-21 PROCEDURE — G0157 HHC PT ASSISTANT EA 15: HCPCS

## 2022-09-22 PROCEDURE — 1090000001 HH PPS REVENUE CREDIT

## 2022-09-22 PROCEDURE — 1090000002 HH PPS REVENUE DEBIT

## 2022-09-23 ENCOUNTER — HOME CARE VISIT (OUTPATIENT)
Dept: SCHEDULING | Facility: HOME HEALTH | Age: 67
End: 2022-09-23
Payer: MEDICARE

## 2022-09-23 VITALS
SYSTOLIC BLOOD PRESSURE: 138 MMHG | DIASTOLIC BLOOD PRESSURE: 88 MMHG | TEMPERATURE: 98.1 F | OXYGEN SATURATION: 96 % | RESPIRATION RATE: 15 BRPM | HEART RATE: 88 BPM

## 2022-09-23 PROCEDURE — 1090000001 HH PPS REVENUE CREDIT

## 2022-09-23 PROCEDURE — G0157 HHC PT ASSISTANT EA 15: HCPCS

## 2022-09-23 PROCEDURE — 1090000002 HH PPS REVENUE DEBIT

## 2022-09-24 PROCEDURE — 1090000002 HH PPS REVENUE DEBIT

## 2022-09-24 PROCEDURE — 1090000001 HH PPS REVENUE CREDIT

## 2022-09-25 ENCOUNTER — HOME CARE VISIT (OUTPATIENT)
Dept: HOME HEALTH SERVICES | Facility: HOME HEALTH | Age: 67
End: 2022-09-25
Payer: MEDICARE

## 2022-09-25 PROCEDURE — 1090000002 HH PPS REVENUE DEBIT

## 2022-09-25 PROCEDURE — 1090000001 HH PPS REVENUE CREDIT

## 2022-09-26 PROCEDURE — 1090000002 HH PPS REVENUE DEBIT

## 2022-09-26 PROCEDURE — 1090000001 HH PPS REVENUE CREDIT

## 2022-09-27 ENCOUNTER — HOME CARE VISIT (OUTPATIENT)
Dept: SCHEDULING | Facility: HOME HEALTH | Age: 67
End: 2022-09-27
Payer: MEDICARE

## 2022-09-27 VITALS
RESPIRATION RATE: 15 BRPM | OXYGEN SATURATION: 95 % | DIASTOLIC BLOOD PRESSURE: 84 MMHG | TEMPERATURE: 97.4 F | SYSTOLIC BLOOD PRESSURE: 138 MMHG | HEART RATE: 89 BPM

## 2022-09-27 PROCEDURE — 1090000001 HH PPS REVENUE CREDIT

## 2022-09-27 PROCEDURE — 1090000002 HH PPS REVENUE DEBIT

## 2022-09-27 PROCEDURE — G0157 HHC PT ASSISTANT EA 15: HCPCS

## 2022-09-27 ASSESSMENT — ENCOUNTER SYMPTOMS: PAIN LOCATION - PAIN QUALITY: THROBBING

## 2022-09-28 ENCOUNTER — POST-OP TELEPHONE (OUTPATIENT)
Dept: ORTHOPEDICS UNIT | Age: 67
End: 2022-09-28

## 2022-09-28 DIAGNOSIS — M17.12 PRIMARY OSTEOARTHRITIS OF LEFT KNEE: Primary | ICD-10-CM

## 2022-09-28 PROCEDURE — 1090000002 HH PPS REVENUE DEBIT

## 2022-09-28 PROCEDURE — 1090000001 HH PPS REVENUE CREDIT

## 2022-09-28 RX ORDER — HYDROCODONE BITARTRATE AND ACETAMINOPHEN 7.5; 325 MG/1; MG/1
1-2 TABLET ORAL EVERY 4 HOURS PRN
Qty: 60 TABLET | Refills: 0 | Status: SHIPPED | OUTPATIENT
Start: 2022-09-28 | End: 2022-10-05

## 2022-09-28 NOTE — TELEPHONE ENCOUNTER
Patient called navigator to report \" episodes of crying since surgery. \"  3 weeks s/p TKA. Advised may be side effect from narcotics. Will try to wean as tolerated. Patient reports being social and enjoys being around people. Advised may ride in car for short distances. Should go outside for fresh air. Will try above interventions. If no relief from crying in a couple of days, advised to contact PCP to report the above. Patient verbalized understanding.

## 2022-09-28 NOTE — TELEPHONE ENCOUNTER
She had surgery Sept 6. She is needing a refill but wants to stepdown from Oxycodone. Can she try hydrocodone instead? She starts outpt PT next week. The [facility] on file is correct.

## 2022-09-29 ENCOUNTER — HOME CARE VISIT (OUTPATIENT)
Dept: SCHEDULING | Facility: HOME HEALTH | Age: 67
End: 2022-09-29
Payer: MEDICARE

## 2022-09-29 PROCEDURE — G0151 HHCP-SERV OF PT,EA 15 MIN: HCPCS

## 2022-09-29 PROCEDURE — 1090000001 HH PPS REVENUE CREDIT

## 2022-09-29 PROCEDURE — 1090000002 HH PPS REVENUE DEBIT

## 2022-09-29 ASSESSMENT — ENCOUNTER SYMPTOMS: PAIN LOCATION - PAIN QUALITY: ACHE

## 2022-10-03 ENCOUNTER — HOSPITAL ENCOUNTER (OUTPATIENT)
Dept: PHYSICAL THERAPY | Age: 67
Setting detail: RECURRING SERIES
End: 2022-10-03
Payer: MEDICARE

## 2022-10-06 ENCOUNTER — HOSPITAL ENCOUNTER (OUTPATIENT)
Dept: PHYSICAL THERAPY | Age: 67
Setting detail: RECURRING SERIES
Discharge: HOME | End: 2022-10-09
Payer: MEDICARE

## 2022-10-06 PROCEDURE — 97110 THERAPEUTIC EXERCISES: CPT

## 2022-10-06 PROCEDURE — 97161 PT EVAL LOW COMPLEX 20 MIN: CPT

## 2022-10-06 ASSESSMENT — PAIN SCALES - GENERAL: PAINLEVEL_OUTOF10: 6

## 2022-10-06 NOTE — PROGRESS NOTES
Facundo Vegas  : 1955  Primary: [facility]  Secondary:  [address] [facility] [address]  Phone: [phone]  Fax: [phone] Plan Frequency: 2 sessions per week for 8 weeks    Plan of Care/Certification Expiration Date: 23      PT Visit Info:  No data recorded   Visit Count:  1   OUTPATIENT PHYSICAL THERAPY:OP NOTE TYPE: Treatment Note 10/6/2022       Episode  }Appt Desk             Treatment Diagnosis:  Pain in Left Knee (M25.562)  Stiffness of Left Knee, Not elsewhere classified (M25.662)  Difficulty in walking, Not elsewhere classified (R26.2)  Generalized Muscle Weakness (M62.81)Aftercare following joint replacement surgery (Z47.1)  Presence of left artificial knee joint (B18.233)   Medical/Referring Diagnosis:  Presence of left artificial knee joint [X19.789]  Referring Physician:  FLY Iyer CNP, MD Orders:  PT Eval and Treat   Date of Onset:  Onset Date: 22 (S/p L TKA on 22)   Allergies:   Benazepril and Erythromycin  Restrictions/Precautions:  Restrictions/Precautions: Weight Bearing  Left Lower Extremity Weight Bearing: Weight Bearing As Tolerated     Interventions Planned (Treatment may consist of any combination of the following):    Current Treatment Recommendations: Strengthening; ROM; Balance training; Functional mobility training; Transfer training; Endurance training; Gait training; Stair training; Neuromuscular re-education; Manual Therapy - Soft Tissue Mobilization; Manual Therapy - Joint Manipulation; Pain management; Home exercise program; Modalities; Integrated dry needling;  Therapeutic activities     Subjective Comments:  Patient reports L knee pain and stiffness secondary to L TKA  Initial:}    6/10Post Session:        (NA)/10  Medications Last Reviewed:  10/6/2022  Updated Objective Findings:  See evaluation note from today  Treatment   THERAPEUTIC EXERCISE: (10 minutes): Exercises per grid below to improve mobility, strength, balance, and coordination. Required moderate visual, verbal, manual, and tactile cues to promote proper body alignment, promote proper body posture, promote proper body mechanics, and promote proper body breathing techniques. Progressed resistance, range, repetitions, and complexity of movement as indicated. Date:  10/6/2022  Date:   Date:     Activity/Exercise Parameters Parameters Parameters   Heel Prop 5 minutes     Heel Slides X10 with strap for overpressure, supine     Quad Sets X10, supine                       Education Treatment, home exercise plan, plan of care, prognosis, pain modulation      [place] Access Code: [email or web address]    Time spent with patient reviewing proper muscle recruitment and technique with exercises. MANUAL THERAPY: (0 minutes):   Joint mobilization and Soft tissue mobilization was utilized and necessary because of the patient's restricted joint motion, painful spasm, loss of articular motion, and restricted motion of soft tissue. None today    MODALITIES: (0 minutes):        None today      HEP: As above; handouts given to patient for all exercises. Treatment/Session Summary:    Treatment Assessment:  See evaluation note from today for objective data  Communication/Consultation:   Treatment, home exercise plan, plan of care, prognosis, pain modulation   Equipment provided today:  HEP  Recommendations/Intent for next treatment session: Next visit will focus on advancements to more challenging activities to include progressing strength, functional mobility, pain tolerance, and ROM as tolerated.        Total Treatment Billable Duration:  60 minutes: 50 minute low complexity evaluation, 10 minutes therapeutic exercise  Time In: 1000  Time Out: 1100    Tomasa Escalona, PT       Charge Capture  }Post Session Pain  PT Visit Info  [facility] Portal  MD Guidelines  Scanned Media  Benefits  Savored    Future Appointments   Date Time Provider Catherine Parryi   10/10/2022  2:50 PM Wally Centeno MD [facility]

## 2022-10-06 NOTE — THERAPY EVALUATION
Alejandrina Eckert  : 1955  Primary: [facility]  Secondary:  [address] @ [address] [place] [address]  Phone: [phone]  Fax: [phone] Plan Frequency: 2 sessions per week for 8 weeks    Plan of Care/Certification Expiration Date: 23      PT Visit Info:         Visit Count:  1    OUTPATIENT PHYSICAL THERAPY:OP NOTE TYPE: Initial Assessment 10/6/2022               Episode  Appt Desk         Treatment Diagnosis:  Pain in Left Knee (M25.562)  Stiffness of Left Knee, Not elsewhere classified (M25.662)  Difficulty in walking, Not elsewhere classified (R26.2)  Generalized Muscle Weakness (M62.81)Aftercare following joint replacement surgery (Z47.1)  Presence of left artificial knee joint (N42.245)   Medical/Referring Diagnosis:  Presence of left artificial knee joint [P39.726]  Referring Physician:  FLY West CNP, MD Orders:  PT Eval and Treat   Return MD Appt:  10/10/22  Date of Onset:  Onset Date: 22 (S/p L TKA on 22)     Allergies:  Benazepril and Erythromycin  Restrictions/Precautions:    Restrictions/Precautions: Weight Bearing  Left Lower Extremity Weight Bearing: Weight Bearing As Tolerated     Medications Last Reviewed:  10/6/2022     SUBJECTIVE   History of Injury/Illness (Reason for Referral):  Ms. Alejandrina Eckert has attended 1 physical therapy session including initial evaluation as of 10/3/2022. Patient is a 69 y.o. reporting to physical therapy with L knee pain and stiffness secondary to a L TKA on 22. She reports L knee pain, stiffness, and throbbing. She states her pain usually presents in the anterior medial knee and it lasts 10 minutes at a time. The patient has been compliant with her home health physical therapy exercises. She is a retired  and did not exercise prior to her surgery.  She uses a wheeled walker most of the time and reports using a single point cane at home occasionally. She reports sleeping well and maintains her heel propped. She is aware and compliant with all s/p TKA precautions. She is unable to stand for long durations nor walk for great distances. Patient Stated Goal(s):  \"Patient wishes to walk without pain or impairments\"  Initial:     6/10 Post Session:      (NA)/10  Past Medical History/Comorbidities:   Ms. Boris Caceres  has a past medical history of Allergic rhinitis, Anxiety, Arthritis, Autoimmune hepatitis ([address] Chronic pain syndrome, Depression, Ear problems, Elevated liver enzymes, Facet arthritis of lumbar region, GERD (gastroesophageal reflux disease), Hearing reduced, Hyperlipemia, Hypertension, Iron deficiency anemia, Menopausal disorder, Metabolic syndrome, Osteoporosis, Restrictive lung disease, and Thickened endometrium. Ms. Boris Caceres  has a past surgical history that includes orthopedic surgery (Left, 03/13/2014); Cholecystectomy; other surgical history (2001); ERCP (05/2021); and Total knee arthroplasty (Left, 9/6/2022). Social History/Living Environment:   Lives With: Family  Type of Home: House  Home Layout: One level  Home Access: Stairs to enter with rails  Entrance Stairs - Rails: Left  Entrance Stairs - Number of Steps: 1  Bathroom Shower/Tub: Walk-in shower  Bathroom Equipment: Built-in shower seat; Grab bars in [address]     Prior Level of Function/Work/Activity:   Occupation: Retired  Ambulation Assistance: Independent        Learning:   Does the patient/guardian have any barriers to learning?: No barriers  What is the preferred language of the patient/guardian?: English  How does the patient/guardian prefer to learn new concepts?: Listening; Reading; Demonstration; Pictures/Videos     Fall Risk Scale:    Lewis Total Score: 50  Lewis Fall Risk: High (45 and higher)     Dominant Side:  right handed        OBJECTIVE       ROM Date:  10/3/2022   KNEE ROM (TESTED IN SUPINE)    RIGHT LEFT   Flexion 110° 94°   Extension -5° -6°     PALPATION/TONE/TISSUE TEXTURE:   Date:   10/3/2022   SOFT TISSUE:   Quads Unremarkable    Hamstrings Unremarkable    TFL/IT band Unremarkable    Gastroc/soleus/post tib Increased tone noted on L LE   Skin integrity   Incision site healing appropriately, steri-strips intact, no signs of infection, warm to touch     BALANCE Date: 10/3/2022   Right NT   Left NT     STRENGTH   Date: 10/3/2022     Right Left   Hip Abduction NT NT   Hip IR 4 5   Hip ER 4- 5   Hip Flexion 4+ 5   Knee Extension 4+ 5   Knee Flexion 5 5   Ankle DF 5 5   Ankle PF 5 5   Ankle IV 5 5   Ankle EV 5 5     FUNCTIONAL MOBILITY   Date:   10/3/2022   Transfers Independent with moderate use of UE support   Gait deviations Antalgic gait with lateral sway favoring the unaffected LE   Assistive device Wheeled walker mostly, occasional use of single point cane at home   Stairs NA   Bed mobility Independent, slow pace     EDEMA Date:  10/3/2022      Activity/Exercise Left  Right   Mid patella  51 44         NEUROLOGICAL SCREEN: Assessed @ Initial Visit    -RADIATING SYMPTOMS:       MYOTOMES Date: 10/3/2022      Right Left   L2 & L3   (Hip Flexors) 5/5 5/5   L3-L4  (Knee Extensors) 5/5 5/5   L4  (Ankle DFs) 5/5 5/5   L5  (Hallux Ext) 5/5 5/5   L5-S1  (Ankle PFs) 5/5 5/5   S1-S2  (Ankle EVs) 5/5 5/5     Reflexes Date: 10/3/2022      Right Left   L4  (Quadriceps) 2+ NA, surgical leg   S1  (Achilles) 2+ 2+     Note: Patient denies any increase of symptoms with cough, sneeze or valsalva. Patient denies any saddle paresthesia or bowel/bladder deficits. Note: Patient denies any increase of symptoms with cough, sneeze or valsalva. Patient denies any saddle paresthesia or bowel/bladder deficits. ASSESSMENT   Initial Assessment:    Ms. Fara Hair has attended 1 physical therapy session including initial evaluation as of 10/3/2022.  Patient is a 69 y.o. reporting to physical therapy with L knee pain and stiffness secondary to a L TKA on 9/6/22. She reports L knee pain, stiffness, and throbbing. She states her pain usually presents in the anterior medial knee and it lasts 10 minutes at a time. The patient has been compliant with her home health physical therapy exercises. She is a retired  and did not exercise prior to her surgery. She uses a wheeled walker most of the time and reports using a single point cane at home occasionally. She reports sleeping well and maintains her heel propped. She is aware and compliant with all s/p TKA precautions. She is unable to stand for long durations nor walk for great distances. Fara Hair will benefit from home exercise program, therapeutic and postural strengthening exercises, manual therapeutic techniques as appropriate to address Anjana Luke's current condition. Fara Hair will benefit from skilled PT (medically necessary) to address above deficits affecting participation in basic ADLs and overall functional tolerance. Problem List: (Impacting functional limitations): Body Structures, Functions, Activity Limitations Requiring Skilled Therapeutic Intervention: Decreased functional mobility ; Decreased ROM; Decreased body mechanics; Decreased strength; Decreased endurance; Decreased balance; Increased pain; Decreased posture     Therapy Prognosis:   Therapy Prognosis: Good     Assessment Complexity:   Low Complexity  PLAN   Effective Dates: 10/6/2022 TO Plan of Care/Certification Expiration Date: 01/04/23     Frequency/Duration: Plan Frequency: 2 sessions per week for 8 weeks     Interventions Planned (Treatment may consist of any combination of the following):    Current Treatment Recommendations: Strengthening; ROM; Balance training; Functional mobility training; Transfer training;  Endurance training; Gait training; Stair training; Neuromuscular re-education; Manual Therapy - Soft Tissue Mobilization; Manual Therapy - Joint Manipulation; Pain management; Home exercise program; Modalities; Integrated dry needling; Therapeutic activities     GOALS: (Goals have been discussed and agreed upon with patient.)  Short Term Goals 4 weeks   Lois Phillips will be independent with HEP to promote self-management of symptoms. Lois Phillips will perform Nu Step x 10 minutes for hip and knee AAROM. Lois Phillips will tolerate manual therapy/joint mobilizations to increase knee flexion ROM so patient can ambulate stairs and walk with a more normalized gait pattern. Lois Phillips will participate in static and dynamic balance activities for 5 minutes to help improve proprioception and decrease risk of falls. Lois Phillips will demonstrate L knee extension >= 0 degrees to improve functional mobility and tolerance of ADLs. Long Term Goals 12 weeks  Lois Phillips will be able to perform 10x sit to stand transfers independently with increased knee flexion and decreased use of upper extremities. Lois Phillips will ascend/descend 5 steps with reciprocal gait pattern and rail. Lois Phillips will improve MMT B LE to >=4+/5 to improve current level of independence and community reintegration  Lois Phillips will demonstrate L knee flexion >= 110 degrees to improve functional mobility and tolerance of ADLs. Lois Phillips will increase their score on the Lower Extremity Functional Scale by 10 points from their initial score to show improvement in areas of difficulty. Outcome Measure: Tool Used: Lower Extremity Functional Scale (LEFS)  Score:  Initial: 14/80 Most Recent: X/80 (Date: -- )   Interpretation of Score: 20 questions each scored on a 5 point scale with 0 representing \"extreme difficulty or unable to perform\" and 4 representing \"no difficulty\". The lower the score, the greater the functional disability. 80/80 represents no disability. Minimal detectable change is 9 points.   Medical Necessity:   Patient is expected to demonstrate progress in strength, range of motion, balance, coordination and functional technique to return to prior level of function. Skilled intervention continues to be required due to decreased ROM. muscle weakness, decreased functional mobility, impaired gait, decreased posture, and increased pain. Reason for Services/other comments:  Patient continues to require skilled intervention due to above deficits affecting participation in basic ADLs and overall functional tolerance. Total Duration:  Time In: 1000  Time Out: 80    Regarding Anjana Luke's therapy, I certify that the treatment plan above will be carried out by a therapist or under their direction.   Thank you for this referral,  Susan Drummond, PT     Referring Physician Signature: FLY Doherty * _______________________________ Date _____________        Post Session Pain  Charge Capture  PT Visit Info MD Nina Padilla

## 2022-10-10 ENCOUNTER — OFFICE VISIT (OUTPATIENT)
Dept: ORTHOPEDIC SURGERY | Age: 67
End: 2022-10-10

## 2022-10-10 DIAGNOSIS — Z96.652 HISTORY OF TOTAL KNEE ARTHROPLASTY, LEFT: Primary | ICD-10-CM

## 2022-10-10 DIAGNOSIS — Z09 FOLLOW-UP EXAMINATION: ICD-10-CM

## 2022-10-10 DIAGNOSIS — M25.562 ACUTE PAIN OF LEFT KNEE: Primary | ICD-10-CM

## 2022-10-10 DIAGNOSIS — Z96.652 HISTORY OF TOTAL KNEE ARTHROPLASTY, LEFT: ICD-10-CM

## 2022-10-10 PROCEDURE — 99024 POSTOP FOLLOW-UP VISIT: CPT | Performed by: ORTHOPAEDIC SURGERY

## 2022-10-10 RX ORDER — HYDROCODONE BITARTRATE AND ACETAMINOPHEN 7.5; 325 MG/1; MG/1
1-2 TABLET ORAL EVERY 4 HOURS PRN
Qty: 60 TABLET | Refills: 0 | Status: SHIPPED | OUTPATIENT
Start: 2022-10-10 | End: 2022-10-17

## 2022-10-10 NOTE — PROGRESS NOTES
Name: Nancy Pop  YOB: 1955  Gender: female  MRN: [id number]    LEFT Post-Op TKA 4 week follow up    This patient returns now four week status post s/p TKA. Things have gone reasonably well with therapy and the patient is now weaning from the cane. The pain level has improved. There has been no significant problem since the patient was last seen. On exam, the incision is healing approriately. ROM is 0 to 110. The patient has minimal antalgia in stance and good alignment in stance. Radiographs are obtained. Standing AP, flexion lateral and sunrise views of the LEFT knee demonstrates well positioned TKA with good bone implant interfaces. No significant abnormalities are seen. RADIOGRAPHIC IMPRESSION: Stable LEFT TKA. CLINICAL IMPRESSION AND PLAN: Now four weeks s/p TKA . The patient is doing reasonably well. I have made recommendations about activity. We will ask the patient to continue to wean from the cane. Recommendations for further therapy include OUTPATIENT THERAPY FOR 3-4 WEEKS. The patient will follow back up in in 4-5 months.       Work/Activity Restrictions: NOT APPLICABLE    JENNIFER Gutierrez

## 2022-10-11 VITALS
RESPIRATION RATE: 16 BRPM | DIASTOLIC BLOOD PRESSURE: 86 MMHG | TEMPERATURE: 98 F | HEART RATE: 72 BPM | SYSTOLIC BLOOD PRESSURE: 138 MMHG | OXYGEN SATURATION: 97 %

## 2022-10-11 DIAGNOSIS — Z96.652 HISTORY OF TOTAL KNEE ARTHROPLASTY, LEFT: Primary | ICD-10-CM

## 2022-10-13 ENCOUNTER — HOSPITAL ENCOUNTER (OUTPATIENT)
Dept: PHYSICAL THERAPY | Age: 67
Setting detail: RECURRING SERIES
Discharge: HOME | End: 2022-10-16
Payer: MEDICARE

## 2022-10-13 PROCEDURE — 97110 THERAPEUTIC EXERCISES: CPT

## 2022-10-13 PROCEDURE — 97140 MANUAL THERAPY 1/> REGIONS: CPT

## 2022-10-13 ASSESSMENT — PAIN SCALES - GENERAL: PAINLEVEL_OUTOF10: 0

## 2022-10-13 NOTE — PROGRESS NOTES
David Blood  : 1955  Primary: [facility]  Secondary:  Darlene Velasquez @ [address] [facility] [address]  Phone: [phone]  Fax: [phone] Plan Frequency: 2 sessions per week for 8 weeks    Plan of Care/Certification Expiration Date: 23      PT Visit Info:  No data recorded   Visit Count:  2   OUTPATIENT PHYSICAL THERAPY:OP NOTE TYPE: Treatment Note 10/13/2022       Episode  }Appt Desk             Treatment Diagnosis:  Pain in Left Knee (M25.562)  Stiffness of Left Knee, Not elsewhere classified (M25.662)  Difficulty in walking, Not elsewhere classified (R26.2)  Generalized Muscle Weakness (M62.81)Aftercare following joint replacement surgery (Z47.1)  Presence of left artificial knee joint (K91.207)   Medical/Referring Diagnosis:  Presence of left artificial knee joint [T92.587]  Referring Physician:  Carollynn Cushing, MD MD Orders:  PT Eval and Treat   Date of Onset:  Onset Date: 22 (S/p L TKA on 22)     Allergies:   Benazepril and Erythromycin  Restrictions/Precautions:  Restrictions/Precautions: Weight Bearing  Left Lower Extremity Weight Bearing: Weight Bearing As Tolerated     Interventions Planned (Treatment may consist of any combination of the following):    Current Treatment Recommendations: Strengthening; ROM; Balance training; Functional mobility training; Transfer training; Endurance training; Gait training; Stair training; Neuromuscular re-education; Manual Therapy - Soft Tissue Mobilization; Manual Therapy - Joint Manipulation; Pain management; Home exercise program; Modalities; Integrated dry needling; Therapeutic activities     Subjective Comments:  Patient arrives to therapy with no report of knee pain. She ambulates with a wheeled walker.   Initial:}    0/10Post Session:       0/10  Medications Last Reviewed:  10/13/2022  Updated Objective Findings:   Active L knee ROM: 6-101°  Treatment   THERAPEUTIC EXERCISE: (56 minutes):    Exercises per grid below to improve mobility, strength, balance, and coordination. Required moderate visual, verbal, manual, and tactile cues to promote proper body alignment, promote proper body posture, promote proper body mechanics, and promote proper body breathing techniques. Progressed resistance, range, repetitions, and complexity of movement as indicated. Date:  10/6/2022  Date:  10/13/22 Date:     Activity/Exercise Parameters Parameters Parameters   NuStep  5 minutes, level 1, seat at 16    Heel Prop 5 minutes 10 minutes    Heel Slides X10 with strap for overpressure, supine 3x10 with strap for overpressure, supine    Quad Sets X10, supine 10x 10 second hold, supine    Gait Training  2 laps with SPC, emphasis on heal-toe movement pattern    PROM  Knee flexion/extension: x30, therapist applied overpressure    Calf stretch  2x60 seconds, slant board    Education Treatment, home exercise plan, plan of care, prognosis, pain modulation  -    [employer] Access Code: [email or web address]    Time spent with patient reviewing proper muscle recruitment and technique with exercises. MANUAL THERAPY: (10 minutes):   Joint mobilization and Soft tissue mobilization was utilized and necessary because of the patient's restricted joint motion, painful spasm, loss of articular motion, and restricted motion of soft tissue. Patellar mobilizations in all planes, supine  Soft tissue mobilization of distal thigh, gross knee, and proximal calf to reduce swelling    MODALITIES: (0 minutes):        None today      HEP: As above; handouts given to patient for all exercises. Treatment/Session Summary:    Treatment Assessment:  Patient continues to improve in knee ROM and pain tolerance.  Patient able to reach greater knee flexion and extension  Communication/Consultation:  None today  Equipment provided today:  None today  Recommendations/Intent for next treatment session: Next visit will focus on advancements to more challenging activities to include progressing strength, functional mobility, pain tolerance, and ROM as tolerated.      Total Treatment Billable Duration:  56 minutes  Time In: 0871  Time Out: 1515 Tawnya Malik, PT       Charge Capture  }Post Session Pain  PT Visit [address] [facility] Portal  MD Guidelines  Scanned Media  Benefits  MyChart    Future Appointments   Date Time Provider Catherine Janina   10/14/2022  3:30 PM Threasa [place], PT [facility]   10/17/2022  2:30 PM Threasa [place], PT [facility]   10/20/2022  2:30 PM Jillene Carbo, PT [facility]   10/25/2022  2:30 PM Jillene Carbo, PT [facility]   10/27/2022  2:30 PM Threasa [place], PT [facility]   10/31/2022  2:30 PM Jillene Carbo, PT [facility]   11/2/2022  2:30 PM Threasa [place], PT [facility]   11/7/2022  2:30 PM Jillene Carbo, PT [facility]   11/9/2022  2:30 PM Threasa [place], PT [facility]   11/14/2022  2:30 PM Jillene Carbo, PT [facility]   11/16/2022  2:30 PM Jillene Carbo, PT [facility]   11/21/2022  2:30 PM Threasa [place], PT [facility]   11/23/2022  2:30 PM Jillene Carbo, PT [facility]   11/28/2022  2:30 PM Threasa [place], PT [facility]

## 2022-10-14 ENCOUNTER — HOSPITAL ENCOUNTER (OUTPATIENT)
Dept: PHYSICAL THERAPY | Age: 67
Setting detail: RECURRING SERIES
Discharge: HOME | End: 2022-10-17
Payer: MEDICARE

## 2022-10-14 PROCEDURE — 97140 MANUAL THERAPY 1/> REGIONS: CPT

## 2022-10-14 PROCEDURE — 97110 THERAPEUTIC EXERCISES: CPT

## 2022-10-14 ASSESSMENT — PAIN SCALES - GENERAL: PAINLEVEL_OUTOF10: 3

## 2022-10-14 NOTE — PROGRESS NOTES
SUPINE)     RIGHT LEFT   Flexion 110° 101°   Extension -5° -6°     Treatment   THERAPEUTIC EXERCISE: (43 minutes):    Exercises per grid below to improve mobility, strength, balance, and coordination. Required moderate visual, verbal, manual, and tactile cues to promote proper body alignment, promote proper body posture, promote proper body mechanics, and promote proper body breathing techniques. Progressed resistance, range, repetitions, and complexity of movement as indicated. Date:  10/6/2022  Date:  10/13/22 Date:  10/14/22   Activity/Exercise Parameters Parameters Parameters   NuStep  5 minutes, level 1, seat at 16 8 minutes, level 1, seat at 14   Heel Prop 5 minutes 10 minutes 10 minutes   Heel Slides X10 with strap for overpressure, supine 3x10 with strap for overpressure, supine 3x10 with strap for overpressure, supine   Quad Sets X10, supine 10x 10 second hold, supine 10x 10 second hold, supine   Gait Training  2 laps with SPC, emphasis on heal-toe movement pattern 2 laps with SPC, emphasis on heal-toe movement pattern   PROM  Knee flexion/extension: x30, therapist applied overpressure Next Session   Calf stretch  2x60 seconds, slant board x60 seconds, slant board   Hamstring Stretch    4x30 seconds, seated with strap,leg propped on box   Education Treatment, home exercise plan, plan of care, prognosis, pain modulation  - -   [employer] Access Code: [email or web address]    Time spent with patient reviewing proper muscle recruitment and technique with exercises. MANUAL THERAPY: (12 minutes):   Joint mobilization and Soft tissue mobilization was utilized and necessary because of the patient's restricted joint motion, painful spasm, loss of articular motion, and restricted motion of soft tissue.    Patellar mobilizations in all planes, supine  Soft tissue mobilization of distal thigh, gross knee, and proximal calf to reduce swelling  Scar tissue mobilization of incision site to reduce restricted soft tissue, manually and with suction cup instrument    MODALITIES: (0 minutes):        None today      HEP: As above; handouts given to patient for all exercises. Treatment/Session Summary:    Treatment Assessment:  Patient continues to gradually improve ROM, strength, and progresses to ambulating with SPC. The patient reports aggravation of knee pain with stretches into extension and flexion. The patient reports her knee feels improvements in mobility at end of session. Communication/Consultation:  None today  Equipment provided today:  None today  Recommendations/Intent for next treatment session: Next visit will focus on advancements to more challenging activities to include progressing strength, functional mobility, pain tolerance, and ROM as tolerated.      Total Treatment Billable Duration:  55 minutes  Time In: 0182  Time Out: WALDEMAR Mora       Charge Capture  }Post Session Pain  PT Visit Info  [address] [facility]  MD Guidelines  Scanned Media  Benefits  MyChart    Future Appointments   Date Time Provider Catherine Roa   10/17/2022  2:30 PM Marli Neil, PT [facility]   10/20/2022  2:30 PM Deja Ghazi, PT [facility]   10/25/2022  2:30 PM Deja Ghazi, PT [facility]   10/27/2022  2:30 PM Marli Banter, PT [facility]   10/31/2022  2:30 PM Deja Ghazi, PT [facility]   11/2/2022  2:30 PM Marli Banter, PT [facility]   11/7/2022  2:30 PM Deja Ghazi, PT [facility]   11/9/2022  2:30 PM Marli Banter, PT [facility]   11/14/2022  2:30 PM Deja Ghazi, PT [facility]   11/16/2022  2:30 PM Deja Ghazi, PT [facility]   11/21/2022  2:30 PM Marli Banter, PT [facility]   11/23/2022  2:30 PM Deja Ghazi, PT [facility]   11/28/2022  2:30 PM Marli Banter, PT [facility]

## 2022-10-18 ENCOUNTER — HOSPITAL ENCOUNTER (OUTPATIENT)
Dept: PHYSICAL THERAPY | Age: 67
Setting detail: RECURRING SERIES
Discharge: HOME | End: 2022-10-21
Payer: MEDICARE

## 2022-10-18 PROCEDURE — 97140 MANUAL THERAPY 1/> REGIONS: CPT

## 2022-10-18 PROCEDURE — 97110 THERAPEUTIC EXERCISES: CPT

## 2022-10-18 NOTE — PROGRESS NOTES
Abner Horta  : 1955  Primary: Marilyn Gosselin [facility]mo  Secondary:  [address] @ [address] [facility] [address]  Phone: [phone]  Fax: [phone] Plan Frequency: 2 sessions per week for 8 weeks    Plan of Care/Certification Expiration Date: 23      PT Visit Info:  No data recorded   Visit Count:  4   OUTPATIENT PHYSICAL THERAPY:OP NOTE TYPE: Treatment Note 10/18/2022       Episode  }Appt Desk             Treatment Diagnosis:  Pain in Left Knee (M25.562)  Stiffness of Left Knee, Not elsewhere classified (M25.662)  Difficulty in walking, Not elsewhere classified (R26.2)  Generalized Muscle Weakness (M62.81)Aftercare following joint replacement surgery (Z47.1)  Presence of left artificial knee joint (T34.084)   Medical/Referring Diagnosis:  Presence of left artificial knee joint [U83.839]  Referring Physician:  Taurus Odonnell MD MD Orders:  PT Eval and Treat   Date of Onset:  Onset Date: 22 (S/p L TKA on 22)     Allergies:   Benazepril and Erythromycin  Restrictions/Precautions:  Restrictions/Precautions: Weight Bearing  Left Lower Extremity Weight Bearing: Weight Bearing As Tolerated     Interventions Planned (Treatment may consist of any combination of the following):    Current Treatment Recommendations: Strengthening; ROM; Balance training; Functional mobility training; Transfer training; Endurance training; Gait training; Stair training; Neuromuscular re-education; Manual Therapy - Soft Tissue Mobilization; Manual Therapy - Joint Manipulation; Pain management; Home exercise program; Modalities; Integrated dry needling; Therapeutic activities     Subjective Comments:  Patient arrives with mild knee pain and stiffness. She arrives with a single point cane. She is compliant with HEP.    Initial:}    2/10Post Session:       3/10  Medications Last Reviewed:  10/18/2022  Updated Objective Findings:   See below  ROM Date:  10/13/2022    KNEE ROM (TESTED IN SUPINE)     RIGHT LEFT   Flexion 110° 101°   Extension -5° -6°     Treatment   THERAPEUTIC EXERCISE: (46 minutes):    Exercises per grid below to improve mobility, strength, balance, and coordination. Required moderate visual, verbal, manual, and tactile cues to promote proper body alignment, promote proper body posture, promote proper body mechanics, and promote proper body breathing techniques. Progressed resistance, range, repetitions, and complexity of movement as indicated. Date:  10/6/2022  Date:  10/13/22 Date:  10/18/22   Activity/Exercise Parameters Parameters Parameters   NuStep  5 minutes, level 1, seat at 16 5 minutes, level 1, seat at 14   Heel Prop 5 minutes 10 minutes 10 minutes   Heel Slides X10 with strap for overpressure, supine 3x10 with strap for overpressure, supine Active: 2x10 Strap: 1x10  supine   Quad Sets X10, supine 10x 10 second hold, supine 3x10   3  second hold, supine   Short Arc Quad   3x10   Gait Training  2 laps with SPC, emphasis on heal-toe movement pattern 2 laps with SPC, emphasis on heal-toe movement pattern   PROM  Knee flexion/extension: x30, therapist applied overpressure x20, therapist applied overpressure   Calf stretch  2x60 seconds, slant board    Hamstring Stretch       Education Treatment, home exercise plan, plan of care, prognosis, pain modulation  - -   [facility] Access Code: [email or web address]    Time spent with patient reviewing proper muscle recruitment and technique with exercises. MANUAL THERAPY: (11 minutes):   Joint mobilization and Soft tissue mobilization was utilized and necessary because of the patient's restricted joint motion, painful spasm, loss of articular motion, and restricted motion of soft tissue.    Patellar mobilizations in all planes, supine  Soft tissue mobilization of distal thigh,general knee area, and proximal calf to reduce swelling  Scar tissue mobilization of incision site to reduce restricted soft tissue, manually and with suction cup instrument    MODALITIES: (0 minutes):        None today      HEP: As above; handouts given to patient for all exercises. Treatment/Session Summary:    Treatment Assessment:  Patient continues to progress in strength, ROM, and pain tolerance. Patient executes new exercises without complaints of pain or difficulty. Patient knee edema continues to decrease. Communication/Consultation:  None today  Equipment provided today:  None today  Recommendations/Intent for next treatment session: Next visit will focus on advancements to more challenging activities to include progressing strength, functional mobility, pain tolerance, and ROM as tolerated.      Total Treatment Billable Duration:  57 minutes  Time In: 1330  Time Out: [address] [facility], PT       Charge Capture  }Post Session Pain  PT Visit [address] [facility] Portal  MD Guidelines  Scanned Media  Benefits  MyChart    Future Appointments   Date Time Provider Catherine Roa   10/20/2022  2:30 PM Carie Vazquez, [address] [facility]   10/25/2022  2:30 PM Charolett George, PT [facility]   10/27/2022  2:30 PM Mauro Ohms, PT [facility]   10/31/2022  2:30 PM Charolett George, PT [facility]   11/2/2022  2:30 PM Mauro Ohms, PT [facility]   11/7/2022  2:30 PM Charolett George, PT [facility]   11/9/2022  2:30 PM Mauro Ohms, PT [facility]   11/14/2022  2:30 PM Charolett George, PT [facility]   11/16/2022  2:30 PM Charolett George, PT [facility]   11/21/2022  2:30 PM Mauro Ohms, PT [facility]   11/23/2022  2:30 PM Charolett George, PT [facility]   11/28/2022  2:30 PM Mauro Ohms, PT [facility]

## 2022-10-19 ASSESSMENT — PAIN SCALES - GENERAL: PAINLEVEL_OUTOF10: 2

## 2022-10-20 ENCOUNTER — HOSPITAL ENCOUNTER (OUTPATIENT)
Dept: PHYSICAL THERAPY | Age: 67
Setting detail: RECURRING SERIES
Discharge: HOME | End: 2022-10-23
Payer: MEDICARE

## 2022-10-20 PROCEDURE — 97110 THERAPEUTIC EXERCISES: CPT

## 2022-10-20 PROCEDURE — 97140 MANUAL THERAPY 1/> REGIONS: CPT

## 2022-10-20 ASSESSMENT — PAIN SCALES - GENERAL: PAINLEVEL_OUTOF10: 4

## 2022-10-20 NOTE — PROGRESS NOTES
Ousmane Hooks  : 1955  Primary: Cierra Concepcion [facility]  Secondary:  [address] @ [address] [place] [address]  Phone: [phone]  Fax: [phone] Plan Frequency: 2 sessions per week for 8 weeks    Plan of Care/Certification Expiration Date: 23      PT Visit Info:  No data recorded   Visit Count:  5   OUTPATIENT PHYSICAL THERAPY:OP NOTE TYPE: Treatment Note 10/20/2022       Episode  }Appt Desk             Treatment Diagnosis:  Pain in Left Knee (M25.562)  Stiffness of Left Knee, Not elsewhere classified (M25.662)  Difficulty in walking, Not elsewhere classified (R26.2)  Generalized Muscle Weakness (M62.81)Aftercare following joint replacement surgery (Z47.1)  Presence of left artificial knee joint (J20.601)   Medical/Referring Diagnosis:  Presence of left artificial knee joint [O88.140]  Referring Physician:  Dago Lawler MD MD Orders:  PT Eval and Treat   Date of Onset:  Onset Date: 22 (S/p L TKA on 22)     Allergies:   Benazepril and Erythromycin  Restrictions/Precautions:  Restrictions/Precautions: Weight Bearing  Left Lower Extremity Weight Bearing: Weight Bearing As Tolerated     Interventions Planned (Treatment may consist of any combination of the following):    Current Treatment Recommendations: Strengthening; ROM; Balance training; Functional mobility training; Transfer training; Endurance training; Gait training; Stair training; Neuromuscular re-education; Manual Therapy - Soft Tissue Mobilization; Manual Therapy - Joint Manipulation; Pain management; Home exercise program; Modalities; Integrated dry needling; Therapeutic activities     Subjective Comments:   Patient reports her left knee is not feeling too bad.   Initial:}    4/10Post Session:       2/10  Medications Last Reviewed:  10/20/2022  Updated Objective Findings:   See below  AROM: -2 deg to 116 deg Post   Treatment   THERAPEUTIC EXERCISE: (38 minutes): Exercises per grid below to improve mobility, strength, balance, and coordination. Required moderate visual, verbal, manual, and tactile cues to promote proper body alignment, promote proper body posture, promote proper body mechanics, and promote proper body breathing techniques. Progressed resistance, range, repetitions, and complexity of movement as indicated. Date:  10/20/22 Date:  10/13/22 Date:  10/18/22   Activity/Exercise Parameters Parameters Parameters   NuStep 10 minutes Working on ROM 5 minutes, level 1, seat at 16 5 minutes, level 1, seat at 14   Heel Prop 10 minutes 10 minutes 10 minutes   Heel Slides Passive 3 x 10 3x10 with strap for overpressure, supine Active: 2x10 Strap: 1x10  supine   Quad Sets 10x 10 second hold, supine 10x 10 second hold, supine 3x10   3  second hold, supine   Short Arc Quad   3x10   Gait Training  2 laps with SPC, emphasis on heal-toe movement pattern 2 laps with SPC, emphasis on heal-toe movement pattern   PROM Passive Flexion 8 minutes Knee flexion/extension: x30, therapist applied overpressure x20, therapist applied overpressure   Calf stretch 6 x 30 sec Slant Board 2x60 seconds, slant board    Sit to Stand 1 x 10 Table     Walking Side Stepping 3 x 30 feet Bilateral UE Support    FWD Walking Eyes Close Single UE Support 3 x 30 feet     BWD 1 x 30 feet Single UE Supprot - -   [facility] Access Code: [email or web address]    Time spent with patient reviewing proper muscle recruitment and technique with exercises. MANUAL THERAPY: (15 minutes):   Joint mobilization and Soft tissue mobilization was utilized and necessary because of the patient's restricted joint motion, painful spasm, loss of articular motion, and restricted motion of soft tissue.    Patellar Mobilization All Directions Grade II/III  Soft Tissue Mobilization Quads, Hamstring, Adductors, Gastroc    MODALITIES: (0 minutes):        None today      HEP: As above; handouts given to patient for all exercises. Treatment/Session Summary:    Treatment Assessment:   Patient demonstrated improved ROM. Patient demonstrated end range tightness. Patient demonstrated fatigue with treatment. Patient would benefit from continued progression of mobility, flexibility, ROM, strength. Communication/Consultation:  None today  Equipment provided today:  None today  Recommendations/Intent for next treatment session: Next visit will focus on advancements to more challenging activities to include progressing strength, functional mobility, pain tolerance, and ROM as tolerated.      Total Treatment Billable Duration:  53 minutes  Time In: 1430  Time Out: [address], PT       Charge Capture  }Post Session Pain  PT Visit [address] [facility] Portal  MD Guidelines  Scanned Media  Benefits  MyChart    Future Appointments   Date Time Provider Catherine Roa   10/25/2022  2:30 PM Delta Gautam, [facility]   10/27/2022  2:30 PM Kelly Minks, PT [facility]   10/31/2022  2:30 PM Delta Gautam, PT [facility]   11/2/2022  2:30 PM Kelly Minks, PT [facility]   11/7/2022  2:30 PM Delta Gautam, PT [facility]   11/9/2022  2:30 PM Kelly Minks, PT [facility]   11/14/2022  2:30 PM Delta Gautam, PT [facility]   11/16/2022  2:30 PM Delta Gautam, PT [facility]   11/21/2022  2:30 PM Kelly Minks, PT [facility]   11/23/2022  2:30 PM Delta Gautam, PT [facility]   11/28/2022  2:30 PM Kelly Minks, PT [facility]

## 2022-10-25 ENCOUNTER — HOSPITAL ENCOUNTER (OUTPATIENT)
Dept: PHYSICAL THERAPY | Age: 67
Setting detail: RECURRING SERIES
Discharge: HOME | End: 2022-10-28
Payer: MEDICARE

## 2022-10-25 PROCEDURE — 97140 MANUAL THERAPY 1/> REGIONS: CPT

## 2022-10-25 PROCEDURE — 97110 THERAPEUTIC EXERCISES: CPT

## 2022-10-25 ASSESSMENT — PAIN SCALES - GENERAL: PAINLEVEL_OUTOF10: 4

## 2022-10-25 NOTE — PROGRESS NOTES
Raudel Galvez  : 1955  Primary: Irina Murray [facility]  Secondary:  Mario Vanegas @ [address] [facility] [address]  Phone: [phone]  Fax: [phone] Plan Frequency: 2 sessions per week for 8 weeks    Plan of Care/Certification Expiration Date: 23      PT Visit Info:  No data recorded   Visit Count:  6   OUTPATIENT PHYSICAL THERAPY:OP NOTE TYPE: Treatment Note 10/25/2022       Episode  }Appt Desk             Treatment Diagnosis:  Pain in Left Knee (M25.562)  Stiffness of Left Knee, Not elsewhere classified (M25.662)  Difficulty in walking, Not elsewhere classified (R26.2)  Generalized Muscle Weakness (M62.81)Aftercare following joint replacement surgery (Z47.1)  Presence of left artificial knee joint (W36.819)   Medical/Referring Diagnosis:  Presence of left artificial knee joint [O45.027]  Referring Physician:  Rock Celestin MD MD Orders:  PT Eval and Treat   Date of Onset:  Onset Date: 22 (S/p L TKA on 22)     Allergies:   Benazepril and Erythromycin  Restrictions/Precautions:  Restrictions/Precautions: Weight Bearing  Left Lower Extremity Weight Bearing: Weight Bearing As Tolerated     Interventions Planned (Treatment may consist of any combination of the following):    Current Treatment Recommendations: Strengthening; ROM; Balance training; Functional mobility training; Transfer training; Endurance training; Gait training; Stair training; Neuromuscular re-education; Manual Therapy - Soft Tissue Mobilization; Manual Therapy - Joint Manipulation; Pain management; Home exercise program; Modalities; Integrated dry needling; Therapeutic activities     Subjective Comments:   Patient reports she had to sit this weekend on a hard surface for awhile and this caused her knee to be really tight since.   Initial:}    4/10Post Session:       2/10  Medications Last Reviewed:  10/25/2022  Updated Objective Findings:   See below  AROM: -2 deg to 111 deg Pre -- 0 - 116 deg Post   Treatment   THERAPEUTIC EXERCISE: (38 minutes):    Exercises per grid below to improve mobility, strength, balance, and coordination. Required moderate visual, verbal, manual, and tactile cues to promote proper body alignment, promote proper body posture, promote proper body mechanics, and promote proper body breathing techniques. Progressed resistance, range, repetitions, and complexity of movement as indicated. Date:  10/20/22 Date:  10/25/2022 Date:  10/18/22   Activity/Exercise Parameters Parameters Parameters   NuStep 10 minutes Working on ROM 10 minutes Working on ROM 5 minutes, level 1, seat at 14   Heel Prop 10 minutes 10 minutes 10 minutes   Heel Slides Passive 3 x 10 Seated Passive 3 x 10 Active: 2x10 Strap: 1x10  supine   Quad Sets 10x 10 second hold, supine 10x 10 second hold, supine 3x10   3  second hold, supine   Short Arc Quad   3x10   Gait Training  2 laps with SPC, emphasis on heal-toe movement pattern 2 laps with SPC, emphasis on heal-toe movement pattern   PROM Passive Flexion 8 minutes Passive Flexion 8 minutes x20, therapist applied overpressure   Calf stretch 6 x 30 sec Slant Board 6 x 30 sec Slant Board    Sit to Stand 1 x 10 Table 3 x 10 Table    Walking Side Stepping 3 x 30 feet Bilateral UE Support    FWD Walking Eyes Close Single UE Support 3 x 30 feet     BWD 1 x 30 feet Single UE Supprot Side Stepping 3 x 30 feet Bilateral UE Support    FWD Walking Eyes Close Single UE Support 3 x 30 feet     BWD 1 x 30 feet Single UE Supprot -   [employer] Access Code: [email or web address]    Time spent with patient reviewing proper muscle recruitment and technique with exercises. MANUAL THERAPY: (15 minutes):   Joint mobilization and Soft tissue mobilization was utilized and necessary because of the patient's restricted joint motion, painful spasm, loss of articular motion, and restricted motion of soft tissue.    Patellar Mobilization All Directions Grade II/III  Soft Tissue Mobilization Quads, Hamstring, Adductors, Gastroc    MODALITIES: (0 minutes):        None today      HEP: As above; handouts given to patient for all exercises. Treatment/Session Summary:    Treatment Assessment:   Patient demonstrated improved ROM. Patient reported decreased tightness with treamtent. Patient demonstrated fatigue with treatment. Patient would benefit from continued progression of mobility, flexibility, ROM, strength. Communication/Consultation:  None today  Equipment provided today:  None today  Recommendations/Intent for next treatment session: Next visit will focus on advancements to more challenging activities to include progressing strength, functional mobility, pain tolerance, and ROM as tolerated.      Total Treatment Billable Duration:  53 minutes  Time In: 1430  Time Out: [address], PT       Charge Capture  }Post Session Pain  PT Visit [address] [facility] Portal  MD Guidelines  Scanned Media  Benefits  MyChart    Future Appointments   Date Time Provider Catherine Roa   10/27/2022  2:30 PM Coco Christian, PT [facility]   10/31/2022  2:30 PM Berry Nyhan, PT [facility]   11/2/2022  2:30 PM Coco Christian, PT [facility]   11/7/2022  2:30 PM Berry Nyhan, PT [facility]   11/9/2022  2:30 PM Coco Christian, PT [facility]   11/14/2022  2:30 PM Berry Nyhan, PT [facility]   11/16/2022  2:30 PM Berry Nyhan, PT [facility]   11/21/2022  2:30 PM Coco Christian, PT [facility]   11/23/2022  2:30 PM Berry Nyhan, PT [facility]   11/28/2022  2:30 PM Coco Christian, PT [facility]

## 2022-10-27 ENCOUNTER — HOSPITAL ENCOUNTER (OUTPATIENT)
Dept: PHYSICAL THERAPY | Age: 67
Setting detail: RECURRING SERIES
End: 2022-10-27
Payer: MEDICARE

## 2022-10-27 NOTE — PROGRESS NOTES
Physical Therapy  [address] [place] [address],[address] at [facility]  10/27/2022    Patient cancelled session due to illness.     Joe James, PT, DPT, CSCS  10/27/2022

## 2022-10-28 ENCOUNTER — HOSPITAL ENCOUNTER (OUTPATIENT)
Dept: PHYSICAL THERAPY | Age: 67
Setting detail: RECURRING SERIES
End: 2022-10-28
Payer: MEDICARE

## 2022-10-31 ENCOUNTER — HOSPITAL ENCOUNTER (OUTPATIENT)
Dept: PHYSICAL THERAPY | Age: 67
Setting detail: RECURRING SERIES
Discharge: HOME | End: 2022-11-03
Payer: MEDICARE

## 2022-10-31 PROCEDURE — 97140 MANUAL THERAPY 1/> REGIONS: CPT

## 2022-10-31 PROCEDURE — 97110 THERAPEUTIC EXERCISES: CPT

## 2022-10-31 ASSESSMENT — PAIN SCALES - GENERAL: PAINLEVEL_OUTOF10: 0

## 2022-10-31 NOTE — PROGRESS NOTES
Vignesh Cardoso  : 1955  Primary: Moises Walls [facility]mo  Secondary:  [address] @ [address]  [address]  Phone: [phone]  Fax: [phone] Plan Frequency: 2 sessions per week for 8 weeks    Plan of Care/Certification Expiration Date: 23      PT Visit Info:  No data recorded   Visit Count:  7   OUTPATIENT PHYSICAL THERAPY:OP NOTE TYPE: Treatment Note 10/31/2022       Episode  }Appt Desk             Treatment Diagnosis:  Pain in Left Knee (M25.562)  Stiffness of Left Knee, Not elsewhere classified (M25.662)  Difficulty in walking, Not elsewhere classified (R26.2)  Generalized Muscle Weakness (M62.81)Aftercare following joint replacement surgery (Z47.1)  Presence of left artificial knee joint (E45.532)   Medical/Referring Diagnosis:  Presence of left artificial knee joint [M20.114]  Referring Physician:  Jere William MD MD Orders:  PT Eval and Treat   Date of Onset:  Onset Date: 22 (S/p L TKA on 22)     Allergies:   Benazepril and Erythromycin  Restrictions/Precautions:  Restrictions/Precautions: Weight Bearing  Left Lower Extremity Weight Bearing: Weight Bearing As Tolerated     Interventions Planned (Treatment may consist of any combination of the following):    Current Treatment Recommendations: Strengthening; ROM; Balance training; Functional mobility training; Transfer training; Endurance training; Gait training; Stair training; Neuromuscular re-education; Manual Therapy - Soft Tissue Mobilization; Manual Therapy - Joint Manipulation; Pain management; Home exercise program; Modalities; Integrated dry needling; Therapeutic activities     Subjective Comments:   Patient reports that she is recovering from being sick and recovering from Covid shot. She reports that her knee is feeling pretty good.   Initial:}    0/10Post Session:       0/10  Medications Last Reviewed:  10/31/2022  Updated Objective Findings:   See below  AROM: -2 deg to 111 deg Pre -- 0 - 117 deg Post   Treatment   THERAPEUTIC EXERCISE: (38 minutes):    Exercises per grid below to improve mobility, strength, balance, and coordination. Required moderate visual, verbal, manual, and tactile cues to promote proper body alignment, promote proper body posture, promote proper body mechanics, and promote proper body breathing techniques. Progressed resistance, range, repetitions, and complexity of movement as indicated. Date:  10/20/22 Date:  10/25/2022 Date:  10/31/22   Activity/Exercise Parameters Parameters Parameters   NuStep 10 minutes Working on ROM 10 minutes Working on ROM 10 minutes Working on ROM   Heel Prop 10 minutes 10 minutes    Heel Slides Passive 3 x 10 Seated Passive 3 x 10 Seated Passive 3 x 10   Quad Sets 10x 10 second hold, supine 10x 10 second hold, supine 10x 10 second hold, supine   Short Arc Quad      Gait Training  2 laps with SPC, emphasis on heal-toe movement pattern    PROM Passive Flexion 8 minutes Passive Flexion 8 minutes Passive Flexion 8 minutes   Calf stretch 6 x 30 sec Slant Board 6 x 30 sec Slant Board 6 x 30 sec Slant Board   Sit to Stand 1 x 10 Table 3 x 10 Table 3 x 10 Table   Walking Side Stepping 3 x 30 feet Bilateral UE Support    FWD Walking Eyes Close Single UE Support 3 x 30 feet     BWD 1 x 30 feet Single UE Supprot Side Stepping 3 x 30 feet Bilateral UE Support    FWD Walking Eyes Close Single UE Support 3 x 30 feet     BWD 1 x 30 feet Single UE Supprot Side Stepping 3 x 30 feet Bilateral UE Support    FWD Walking Eyes Close Single UE Support 3 x 30 feet     BWD Walking Eyes UE Support 3 x 30 feet      Time spent with patient reviewing proper muscle recruitment and technique with exercises.      MANUAL THERAPY: (15 minutes):   Joint mobilization and Soft tissue mobilization was utilized and necessary because of the patient's restricted joint motion, painful spasm, loss of articular motion, and restricted motion of soft tissue. Patellar Mobilization All Directions Grade II/III  Soft Tissue Mobilization Quads, Hamstring, Adductors, Gastroc    MODALITIES: (0 minutes):        None today      HEP: As above; handouts given to patient for all exercises. Treatment/Session Summary:    Treatment Assessment:   Patient demonstrated improved ROM. Patient reported decreased tightness with treamtent. Patient demonstrated fatigue with treatment. Patient would benefit from continued progression of mobility, flexibility, ROM, strength. Communication/Consultation:  None today  Equipment provided today:  None today  Recommendations/Intent for next treatment session: Next visit will focus on advancements to more challenging activities to include progressing strength, functional mobility, pain tolerance, and ROM as tolerated.      Total Treatment Billable Duration:  53 minutes  Time In: 2940  Time Out: [address], PT       Charge Capture  }Post Session Pain  PT Visit [address] [facility] Portal  MD Guidelines  Scanned Media  Benefits  MyChart    Future Appointments   Date Time Provider Catherine Roa   11/2/2022  2:30 PM Joe Jaems, PT [facility]   11/7/2022  2:30 PM Adrian Barros, PT [facility]   11/9/2022  2:30 PM Joe aJmes, PT [facility]   11/14/2022  2:30 PM Adrian Exeter, PT [facility]   11/16/2022  2:30 PM Adrianjose Barkert, PT [facility]   11/21/2022  2:30 PM Joe James, PT [facility]   11/23/2022  2:30 PM Adrian Papo, PT [facility]   11/28/2022  2:30 PM Joe James, PT [facility]

## 2022-11-02 ENCOUNTER — HOSPITAL ENCOUNTER (OUTPATIENT)
Dept: PHYSICAL THERAPY | Age: 67
Setting detail: RECURRING SERIES
Discharge: HOME | End: 2022-11-05
Payer: MEDICARE

## 2022-11-02 PROCEDURE — 97140 MANUAL THERAPY 1/> REGIONS: CPT

## 2022-11-02 PROCEDURE — 97530 THERAPEUTIC ACTIVITIES: CPT

## 2022-11-02 NOTE — PROGRESS NOTES
Amador Carranza  : 1955  Primary: [facility]  Secondary:  [address] @ [address] [facility] [address]  Phone: [phone]  Fax: [phone] Plan Frequency: 2 sessions per week for 8 weeks    Plan of Care/Certification Expiration Date: 23      PT Visit Info:  No data recorded   Visit Count:  8   OUTPATIENT PHYSICAL THERAPY:OP NOTE TYPE: Treatment Note 2022       Episode  }Appt Desk             Treatment Diagnosis:  Pain in Left Knee (M25.562)  Stiffness of Left Knee, Not elsewhere classified (M25.662)  Difficulty in walking, Not elsewhere classified (R26.2)  Generalized Muscle Weakness (M62.81)Aftercare following joint replacement surgery (Z47.1)  Presence of left artificial knee joint (I70.192)   Medical/Referring Diagnosis:  Presence of left artificial knee joint [U87.233]  Referring Physician:  Sam Stanton MD MD Orders:  PT Eval and Treat   Date of Onset:  Onset Date: 22 (S/p L TKA on 22)     Allergies:   Benazepril and Erythromycin  Restrictions/Precautions:  Restrictions/Precautions: Weight Bearing  Left Lower Extremity Weight Bearing: Weight Bearing As Tolerated     Interventions Planned (Treatment may consist of any combination of the following):    Current Treatment Recommendations: Strengthening; ROM; Balance training; Functional mobility training; Transfer training; Endurance training; Gait training; Stair training; Neuromuscular re-education; Manual Therapy - Soft Tissue Mobilization; Manual Therapy - Joint Manipulation; Pain management; Home exercise program; Modalities; Integrated dry needling; Therapeutic activities     Subjective Comments:  Patient arrives with an antalgic gait but she states she feels like she is doing better.   Initial:}    0/10Post Session:       1/10  Medications Last Reviewed:  2022  Updated Objective Findings:   See progress note from today     Treatment   THERAPEUTIC EXERCISE: (46 minutes):    Exercises per grid below to improve mobility, strength, balance, and coordination. Required moderate visual, verbal, manual, and tactile cues to promote proper body alignment, promote proper body posture, promote proper body mechanics, and promote proper body breathing techniques. Progressed resistance, range, repetitions, and complexity of movement as indicated. Date:  11/2/22 Date:  10/25/2022 Date:  10/31/22   Activity/Exercise Parameters Parameters Parameters   NuStep 8 minutes Working on ROM 10 minutes Working on ROM 10 minutes Working on ROM   Heel Prop 10 minutes 10 minutes    Heel Slides 3 x 10 Seated Passive 3 x 10 Seated Passive 3 x 10   Quad Sets 10x 10 second hold, supine 10x 10 second hold, supine 10x 10 second hold, supine   Short Arc Quad 2x10     Gait Training - 2 laps with SPC, emphasis on heal-toe movement pattern    PROM Passive Flexion 10x, therapist applied overpressure  Passive Flexion 8 minutes Passive Flexion 8 minutes   Calf stretch 6 x 30 sec Slant Board 6 x 30 sec Slant Board 6 x 30 sec Slant Board   Sit to Stand 1 x 10 Table 3 x 10 Table 3 x 10 Table   Walking -- Side Stepping 3 x 30 feet Bilateral UE Support    FWD Walking Eyes Close Single UE Support 3 x 30 feet     BWD 1 x 30 feet Single UE Supprot Side Stepping 3 x 30 feet Bilateral UE Support    FWD Walking Eyes Close Single UE Support 3 x 30 feet     BWD Walking Eyes UE Support 3 x 30 feet     Assessment and measurements        Time spent with patient reviewing proper muscle recruitment and technique with exercises. MANUAL THERAPY: (12 minutes):   Joint mobilization and Soft tissue mobilization was utilized and necessary because of the patient's restricted joint motion, painful spasm, loss of articular motion, and restricted motion of soft tissue.    Patellar Mobilization All Directions Grade II/III  Soft Tissue Mobilization Quads, Hamstring, Adductors, Gastroc    MODALITIES: (0 minutes): None today      HEP: As above; handouts given to patient for all exercises. Treatment/Session Summary:    Treatment Assessment:  See progress note from today  Communication/Consultation:  None today  Equipment provided today:  None today  Recommendations/Intent for next treatment session: Next visit will focus on advancements to more challenging activities to include progressing strength, functional mobility, pain tolerance, and ROM as tolerated.      Total Treatment Billable Duration:  58 minutes  Time In: 1430  Time Out: [facility], PT       Charge Capture  }Post Session Pain  PT Visit [address] [facility]  MD Guidelines  Scanned Media  Benefits  MyChart    Future Appointments   Date Time Provider Catherine Roa   11/7/2022  2:30 PM Gustavo Hernandez, PT [facility]   11/9/2022  2:30 PM Kaela Jones, PT [facility]   11/14/2022  2:30 PM Gustavo Hernandez, PT [facility]   11/16/2022  2:30 PM Gustavo Hernandez, PT [facility]   11/21/2022  2:30 PM Kaela Jones, PT [facility]   11/23/2022  2:30 PM Kaela Jones, PT [facility]   11/28/2022  2:30 PM Kaela Jones, PT [facility]

## 2022-11-02 NOTE — PROGRESS NOTES
Emilia Lovell  : 1955  Primary: [facility]  Secondary:  [address] @ [address] [place] [address]  Phone: [phone]  Fax: [phone] Plan Frequency: 2 sessions per week for 8 weeks    Plan of Care/Certification Expiration Date: 23      PT Visit Info:         Visit Count:  8    OUTPATIENT PHYSICAL THERAPY:OP NOTE TYPE: Progress Report 2022               Episode  Appt Desk         Treatment Diagnosis:  Pain in Left Knee (M25.562)  Stiffness of Left Knee, Not elsewhere classified (M25.662)  Difficulty in walking, Not elsewhere classified (R26.2)  Generalized Muscle Weakness (M62.81)Aftercare following joint replacement surgery (Z47.1)  Presence of left artificial knee joint (I59.725)   Medical/Referring Diagnosis:  Presence of left artificial knee joint [U20.066]  Referring Physician:  Juan F Quarles MD MD Orders:  PT Eval and Treat   Return MD Appt:  10/10/22  Date of Onset:  Onset Date: 22 (S/p L TKA on 22)     Allergies:  Benazepril and Erythromycin  Restrictions/Precautions:    Restrictions/Precautions: Weight Bearing  Left Lower Extremity Weight Bearing: Weight Bearing As Tolerated     Medications Last Reviewed:  2022          OBJECTIVE       ROM Date:  2022    KNEE ROM (TESTED IN SUPINE)    RIGHT LEFT   Flexion 111° from 110° 94°   Extension 0° from -5° -6°     PALPATION/TONE/TISSUE TEXTURE:   Date:   22   SOFT TISSUE:   Quads Unremarkable    Hamstrings Increased tone noted at hamstring insertion   TFL/IT band Unremarkable    Gastroc/soleus/post tib Increased tone proximally    Skin integrity   Incision site remains free from signs of infection     BALANCE Date: 2022    Right 2 Seconds   Left 8 Seconds      STRENGTH   Date: 2022      Right Left   Hip Abduction NT NT   Hip IR 4+ from 4 5   Hip ER 5 from 4- 5   Hip Flexion 5 from 4+ 5   Knee Extension 5 from 4+ 5   Knee Flexion 5 from 5 5     FUNCTIONAL MOBILITY   Date:   11/2/2022    Transfers Independent with moderate use of UE support   Gait deviations Antalgic gait with lateral sway favoring the unaffected LE   Assistive device Patient now uses SPC mostly    Stairs NA   Bed mobility Independent, slow pace     EDEMA Date:  11/2/2022       Activity/Exercise Left  Right   Mid patella  47.5 from 51 42.5                ASSESSMENT   Initial Assessment:    Ms. Venancio Perez has attended 1 physical therapy session including initial evaluation as of 10/3/2022. Patient is a 69 y.o. reporting to physical therapy with L knee pain and stiffness secondary to a L TKA on 9/6/22. She reports L knee pain, stiffness, and throbbing. She states her pain usually presents in the anterior medial knee and it lasts 10 minutes at a time. The patient has been compliant with her home health physical therapy exercises. She is a retired  and did not exercise prior to her surgery. She uses a wheeled walker most of the time and reports using a single point cane at home occasionally. She reports sleeping well and maintains her heel propped. She is aware and compliant with all s/p TKA precautions. She is unable to stand for long durations nor walk for great distances. Venancio Perez will benefit from home exercise program, therapeutic and postural strengthening exercises, manual therapeutic techniques as appropriate to address Anjana Crookserson's current condition. Venancio Perez will benefit from skilled PT (medically necessary) to address above deficits affecting participation in basic ADLs and overall functional tolerance. Progress Note 11/2/2022:  Ms. Venancio Perez has attended 7 physical therapy sessions including initial evaluation as of 10/3/2022. She reports feeling 40% better since starting therapy. The patient has demonstrated improvements in ROM. Strength, functional mobility, and pain tolerance.  She is now able to walk independently without AD. She still hurts when standing still. The HEP and ice are currently helping her symptoms. The patient also reports sleeping better. She still presents with decreased ROM, weakness, abnormal posture and gait deviations,  and increased pain. She has met 6 of her goals since starting physical therapy. Camilla Moore will continue to benefit from skilled PT (medically necessary) to address above deficits affecting participation in basic ADLs and overall functional tolerance. Problem List: (Impacting functional limitations): Body Structures, Functions, Activity Limitations Requiring Skilled Therapeutic Intervention: Decreased functional mobility ; Decreased ROM; Decreased body mechanics; Decreased strength; Decreased endurance; Decreased balance; Increased pain; Decreased posture       PLAN   Effective Dates: 10/6/2022 TO Plan of Care/Certification Expiration Date: 01/04/23     Frequency/Duration: Plan Frequency: 2 sessions per week for 8 weeks     Interventions Planned (Treatment may consist of any combination of the following):    Current Treatment Recommendations: Strengthening; ROM; Balance training; Functional mobility training; Transfer training; Endurance training; Gait training; Stair training; Neuromuscular re-education; Manual Therapy - Soft Tissue Mobilization; Manual Therapy - Joint Manipulation; Pain management; Home exercise program; Modalities; Integrated dry needling; Therapeutic activities     GOALS: (Goals have been discussed and agreed upon with patient.)  Short Term Goals 4 weeks   Camilla Moore will be independent with HEP to promote self-management of symptoms. GOAL MET 11/2/2022   Camilla Moore will perform Nu Step x 10 minutes for hip and knee AAROM. GOAL MET 11/2/2022   Camilla Moore will tolerate manual therapy/joint mobilizations to increase knee flexion ROM so patient can ambulate stairs and walk with a more normalized gait pattern.  GOAL MET 11/2/2022   Edin Moe will participate in static and dynamic balance activities for 5 minutes to help improve proprioception and decrease risk of falls. ONGOING 11/2/2022   Anjana Paulino will demonstrate L knee extension >= 0 degrees to improve functional mobility and tolerance of ADLs. GOAL MET 11/2/2022     Long Term Goals 12 weeks  Edin Moe will be able to perform 10x sit to stand transfers independently with increased knee flexion and decreased use of upper extremities. ONGOING 11/2/2022   Anjana Luke will ascend/descend 5 steps with reciprocal gait pattern and rail. ONGOING 11/2/2022   Anjana Lkue will improve MMT B LE to >=4+/5 to improve current level of independence and community reintegration GOAL MET 11/2/2022   Edin Moe will demonstrate L knee flexion >= 110 degrees to improve functional mobility and tolerance of ADLs. GOAL MET 11/2/2022   Edin Moe will increase their score on the Lower Extremity Functional Scale by 10 points from their initial score to show improvement in areas of difficulty. ONGOING 11/2/2022            Outcome Measure: Tool Used: Lower Extremity Functional Scale (LEFS)  Score:  Initial: 14/80 Most Recent: X/80 (Date: -- )   Interpretation of Score: 20 questions each scored on a 5 point scale with 0 representing \"extreme difficulty or unable to perform\" and 4 representing \"no difficulty\". The lower the score, the greater the functional disability. 80/80 represents no disability. Minimal detectable change is 9 points. Medical Necessity:   Patient is expected to demonstrate progress in strength, range of motion, balance, coordination and functional technique to return to prior level of function. Skilled intervention continues to be required due to decreased ROM. muscle weakness, decreased functional mobility, impaired gait, decreased posture, and increased pain.     Reason for Services/other comments:  Patient continues to require skilled intervention due to above deficits affecting participation in basic ADLs and overall functional tolerance. Total Duration:  Time In: 1430  Time Out: 1530    Regarding Anjana TANI Luke's therapy, I certify that the treatment plan above will be carried out by a therapist or under their direction. Thank you for this referral,  Jeff Cohen PT     Referring Physician Signature: Miah Canada MD No Signature is Required for this note.         Post Session Pain  Charge Capture  PT Visit Info MD Guidelines  MyChart

## 2022-11-04 ASSESSMENT — PAIN SCALES - GENERAL: PAINLEVEL_OUTOF10: 0

## 2022-11-09 ENCOUNTER — HOSPITAL ENCOUNTER (OUTPATIENT)
Dept: PHYSICAL THERAPY | Age: 67
Setting detail: RECURRING SERIES
End: 2022-11-09
Payer: MEDICARE

## 2022-11-09 NOTE — PROGRESS NOTES
Physical Therapy  [address] [place] [address],[address] at [facility]  11/9/2022    Patient cancelled due to illness.      Monica Avila, PT, DPT, CSCS  11/9/2022

## 2022-11-14 ENCOUNTER — HOSPITAL ENCOUNTER (OUTPATIENT)
Dept: PHYSICAL THERAPY | Age: 67
Setting detail: RECURRING SERIES
Discharge: HOME | End: 2022-11-17
Payer: MEDICARE

## 2022-11-14 PROCEDURE — 97110 THERAPEUTIC EXERCISES: CPT

## 2022-11-14 PROCEDURE — 97140 MANUAL THERAPY 1/> REGIONS: CPT

## 2022-11-14 ASSESSMENT — PAIN SCALES - GENERAL: PAINLEVEL_OUTOF10: 0

## 2022-11-14 NOTE — PROGRESS NOTES
Ayana Valdez  : 1955  Primary: [facility]  Secondary:  [address] @ [address] [place] [address]  Phone: [phone]  Fax: [phone] Plan Frequency: 2 sessions per week for 8 weeks    Plan of Care/Certification Expiration Date: 23      PT Visit Info:  No data recorded   Visit Count:  9   OUTPATIENT PHYSICAL THERAPY:OP NOTE TYPE: Treatment Note 2022       Episode  }Appt Desk             Treatment Diagnosis:  Pain in Left Knee (M25.562)  Stiffness of Left Knee, Not elsewhere classified (M25.662)  Difficulty in walking, Not elsewhere classified (R26.2)  Generalized Muscle Weakness (M62.81)Aftercare following joint replacement surgery (Z47.1)  Presence of left artificial knee joint (O60.793)   Medical/Referring Diagnosis:  Presence of left artificial knee joint [I72.947]  Referring Physician:  Stephan Paniagua MD MD Orders:  PT Eval and Treat   Date of Onset:  Onset Date: 22 (S/p L TKA on 22)     Allergies:   Benazepril and Erythromycin  Restrictions/Precautions:  Restrictions/Precautions: Weight Bearing  Left Lower Extremity Weight Bearing: Weight Bearing As Tolerated     Interventions Planned (Treatment may consist of any combination of the following):    Current Treatment Recommendations: Strengthening; ROM; Balance training; Functional mobility training; Transfer training; Endurance training; Gait training; Stair training; Neuromuscular re-education; Manual Therapy - Soft Tissue Mobilization; Manual Therapy - Joint Manipulation; Pain management; Home exercise program; Modalities; Integrated dry needling; Therapeutic activities     Subjective Comments:   Patient reports that she had stomach flu end of last week, but is feeling better. She reports that her knee is feeling better.   Initial:}    0/10Post Session:       0/10  Medications Last Reviewed:  2022  Updated Objective Findings:   AROM: 0 - 115 deg Pre --> 0 - 120 deg Post.    Treatment   THERAPEUTIC EXERCISE: (42 minutes):    Exercises per grid below to improve mobility, strength, balance, and coordination. Required moderate visual, verbal, manual, and tactile cues to promote proper body alignment, promote proper body posture, promote proper body mechanics, and promote proper body breathing techniques. Progressed resistance, range, repetitions, and complexity of movement as indicated. Date:  11/2/22 Date:  11/7/2022 Date:  11/14/22   Activity/Exercise Parameters Parameters Parameters   NuStep 8 minutes Working on ROM 10 minutes Working on ROM 10 minutes Working on ROM   Heel Prop 10 minutes     Heel Slides 3 x 10 Seated Passive 3 x 10 Supine 3 x 10 with Strap   Quad Sets 10x 10 second hold, supine 10x 10 second hold, supine 10x 10 second hold, supine   Short Arc Quad 2x10     Hamstring Stretch - 6 x 30 sec Long Sitting 3 x 30 Second with Strap   PROM Passive Flexion 10x, therapist applied overpressure  Passive Flexion 8 minutes Passive Flexion 8 minutes   Calf stretch 6 x 30 sec Slant Board 6 x 30 sec Slant Board 6 x 30 sec Slant Board   Sit to Stand 1 x 10 Table 3 x 10 Table 3 x 10 Table with Red Thera Band   Walking -- Side Stepping 3 x 30 feet Bilateral UE Support    FWD Walking Eyes Close Single UE Support 3 x 30 feet     BWD 1 x 30 feet Single UE Supprot Side Stepping 3 x 30 feet Bilateral UE Support      Assessment and measurements      Step Up  Bilateral UE Support 6 inch 3 x 10     Forward \"T\" Reach   Standing on Right Leg 3 x 10 Single UE Support to 12 inches    SLR   3 x 10   Clams   Hooklying Red Thera Band 3 x 20   Bridge   3 x 10 Red There Band     Time spent with patient reviewing proper muscle recruitment and technique with exercises.      MANUAL THERAPY: (12 minutes):   Joint mobilization and Soft tissue mobilization was utilized and necessary because of the patient's restricted joint motion, painful spasm, loss of articular motion, and restricted motion of soft tissue. Patellar Mobilization All Directions Grade II/III  Soft Tissue Mobilization Quads, Hamstring, Adductors, Gastroc    MODALITIES: (0 minutes):        None today      HEP: As above; handouts given to patient for all exercises. Treatment/Session Summary:    Treatment Assessment:   Patient demonstrated improved knee ROM both pre and post treatment. Patient continues to progression from a strength standpoint. She continues to have proximal LE/Hip and quadriceps weakness. Patient required verbal cues and intermittent tactile cues for correct muscle activation, posture, form, and mechanics. Patient would benefit from continued progression of strength, stability, and balance both statically in isolation and dynamically. Communication/Consultation:  None today  Equipment provided today:  None today  Recommendations/Intent for next treatment session: Next visit will focus on advancements to more challenging activities to include progressing strength, functional mobility, pain tolerance, and ROM as tolerated.      Total Treatment Billable Duration:  54 minutes  Time In: 1430  Time Out: Jose Luis Nicholas PT       Charge Capture  }Post Session Pain  PT Visit [facility]  MD Guidelines  Scanned Media  Benefits  MyChart    Future Appointments   Date Time Provider Catherine Roa   11/16/2022  2:30 PM Annie Wheeler, PT [facility]   11/21/2022  2:30 PM Juancarlos James, PT [facility]   11/23/2022  2:30 PM Juancarlos James, PT [facility]   11/28/2022  2:30 [facility]

## 2022-11-16 ENCOUNTER — HOSPITAL ENCOUNTER (OUTPATIENT)
Dept: PHYSICAL THERAPY | Age: 67
Setting detail: RECURRING SERIES
Discharge: HOME | End: 2022-11-19
Payer: MEDICARE

## 2022-11-16 PROCEDURE — 97110 THERAPEUTIC EXERCISES: CPT

## 2022-11-16 ASSESSMENT — PAIN SCALES - GENERAL: PAINLEVEL_OUTOF10: 0

## 2022-11-16 NOTE — PROGRESS NOTES
Abner Horta  : 1955  Primary: Marilyn Gosselin Plus Hmo  Secondary:  [address] @ [address] [place] [address]  Phone: [phone]  Fax: [phone] Plan Frequency: 2 sessions per week for 8 weeks    Plan of Care/Certification Expiration Date: 23      PT Visit Info:  No data recorded   Visit Count:  10   OUTPATIENT PHYSICAL THERAPY:OP NOTE TYPE: Treatment Note 2022       Episode  }Appt Desk             Treatment Diagnosis:  Pain in Left Knee (M25.562)  Stiffness of Left Knee, Not elsewhere classified (M25.662)  Difficulty in walking, Not elsewhere classified (R26.2)  Generalized Muscle Weakness (M62.81)Aftercare following joint replacement surgery (Z47.1)  Presence of left artificial knee joint (I90.625)   Medical/Referring Diagnosis:  Presence of left artificial knee joint [V48.697]  Referring Physician:  Taurus Odonnell MD MD Orders:  PT Eval and Treat   Date of Onset:  Onset Date: 22 (S/p L TKA on 22)     Allergies:   Benazepril and Erythromycin  Restrictions/Precautions:  Restrictions/Precautions: Weight Bearing  Left Lower Extremity Weight Bearing: Weight Bearing As Tolerated     Interventions Planned (Treatment may consist of any combination of the following):    Current Treatment Recommendations: Strengthening; ROM; Balance training; Functional mobility training; Transfer training; Endurance training; Gait training; Stair training; Neuromuscular re-education; Manual Therapy - Soft Tissue Mobilization; Manual Therapy - Joint Manipulation; Pain management; Home exercise program; Modalities; Integrated dry needling; Therapeutic activities     Subjective Comments:   Patient reports that she had some left knee weakness yesterday briefly, and then it went away.   Initial:}    0/10Post Session:       0/10  Medications Last Reviewed:  2022  Updated Objective Findings:   AROM: 0 - 115 deg Pre --> 0 - 120 deg Post.    Treatment   THERAPEUTIC EXERCISE: (53 minutes):    Exercises per grid below to improve mobility, strength, balance, and coordination. Required moderate visual, verbal, manual, and tactile cues to promote proper body alignment, promote proper body posture, promote proper body mechanics, and promote proper body breathing techniques. Progressed resistance, range, repetitions, and complexity of movement as indicated. Date:  11/16/22 Date:  11/7/2022 Date:  11/14/22   Activity/Exercise Parameters Parameters Parameters   NuStep 10 minutes Working on ROM 10 minutes Working on ROM 10 minutes Working on ROM   Heel Prop      Heel Slides  Seated Passive 3 x 10 Supine 3 x 10 with Strap   Quad Sets 10x 10 second hold, supine 10x 10 second hold, supine 10x 10 second hold, supine   Short Arc Quad      Hamstring Stretch 3 x 60 sec Long Sitting 6 x 30 sec Long Sitting 3 x 30 Second with Strap   PROM  Passive Flexion 8 minutes Passive Flexion 8 minutes   Calf stretch 6 x 30 sec Slant Board 6 x 30 sec Slant Board 6 x 30 sec Slant Board   3 x 15  3 x 10 Table 3 x 10 Table with Red Thera Band   Walking Side Stepping 3 x 50 feet Bilateral UE Support Side Stepping 3 x 30 feet Bilateral UE Support    FWD Walking Eyes Close Single UE Support 3 x 30 feet     BWD 1 x 30 feet Single UE Supprot Side Stepping 3 x 30 feet Bilateral UE Support     Step Up Bilateral UE Support 6 inch 3 x 10  Bilateral UE Support 6 inch 3 x 10     Forward \"T\" Reach   Standing on Right Leg 3 x 10 Single UE Support to 12 inches    SLR   3 x 10   Clams   Hooklying Red Thera Band 3 x 20   Bridge   3 x 10 Red There Band   Knee Flexion Modified on Stairs 3 x 10 Each leg       Time spent with patient reviewing proper muscle recruitment and technique with exercises.      MANUAL THERAPY: (0 minutes):   Joint mobilization and Soft tissue mobilization was utilized and necessary because of the patient's restricted joint motion, painful spasm, loss of articular motion, and restricted motion of soft tissue. Patellar Mobilization All Directions Grade II/III  Soft Tissue Mobilization Quads, Hamstring, Adductors, Gastroc    MODALITIES: (0 minutes):        None today      HEP: As above; handouts given to patient for all exercises. Treatment/Session Summary:    Treatment Assessment:   Patient demonstrated improved mobility, flexibility, ROM, strength, and function. She required cues for equal weight bearing with sit to stands. Patient required verbal cues for posture and form. Patient continues to have strength and balance deficits, but this is improving. She would benefit from continued progression of static and dynamic strength and stability. Communication/Consultation:  None today  Equipment provided today:  None today  Recommendations/Intent for next treatment session: Next visit will focus on advancements to more challenging activities to include progressing strength, functional mobility, pain tolerance, and ROM as tolerated.      Total Treatment Billable Duration:  53 minutes  Time In: 9397  Time Out: 1233 [address], PT       Charge Capture  }Post Session Pain  PT Visit [address] [facility] Portal  MD Guidelines  Scanned Media  Benefits  MyChart    Future Appointments   Date Time Provider Catherine Roa   11/21/2022  2:30 PM Vinh Wong, PT [facility]   11/23/2022  2:30 PM [facility]   11/28/2022  2:30 PM [facility]

## 2022-11-21 ENCOUNTER — HOSPITAL ENCOUNTER (OUTPATIENT)
Dept: PHYSICAL THERAPY | Age: 67
Setting detail: RECURRING SERIES
Discharge: HOME | End: 2022-11-24
Payer: MEDICARE

## 2022-11-21 PROCEDURE — 97110 THERAPEUTIC EXERCISES: CPT

## 2022-11-21 ASSESSMENT — PAIN SCALES - GENERAL: PAINLEVEL_OUTOF10: 0

## 2022-11-21 NOTE — PROGRESS NOTES
Jose Hayes  : 1955  Primary: Cecilia Schilling Plus Hmo  Secondary:  [address] @ [address] [place] [address]  Phone: [phone]  Fax: [phone] Plan Frequency: 2 sessions per week for 8 weeks    Plan of Care/Certification Expiration Date: 23      PT Visit Info:  No data recorded   Visit Count:  11   OUTPATIENT PHYSICAL THERAPY:OP NOTE TYPE: Treatment Note 2022       Episode  }Appt Desk             Treatment Diagnosis:  Pain in Left Knee (M25.562)  Stiffness of Left Knee, Not elsewhere classified (M25.662)  Difficulty in walking, Not elsewhere classified (R26.2)  Generalized Muscle Weakness (M62.81)Aftercare following joint replacement surgery (Z47.1)  Presence of left artificial knee joint (D75.130)   Medical/Referring Diagnosis:  Presence of left artificial knee joint [H80.884]  Referring Physician:  Benita Magdaleno MD MD Orders:  PT Eval and Treat   Date of Onset:  Onset Date: 22 (S/p L TKA on 22)     Allergies:   Benazepril and Erythromycin  Restrictions/Precautions:  Restrictions/Precautions: Weight Bearing  Left Lower Extremity Weight Bearing: Weight Bearing As Tolerated     Interventions Planned (Treatment may consist of any combination of the following):    Current Treatment Recommendations: Strengthening; ROM; Balance training; Functional mobility training; Transfer training; Endurance training; Gait training; Stair training; Neuromuscular re-education; Manual Therapy - Soft Tissue Mobilization; Manual Therapy - Joint Manipulation; Pain management; Home exercise program; Modalities; Integrated dry needling; Therapeutic activities     Subjective Comments:  Patient reports knee stiffness but no pain. She was able to walk around an antique store without taking a break.   Initial:}    0/10Post Session:       0/10  Medications Last Reviewed:  2022  Updated Objective Findings:  None Today    Treatment THERAPEUTIC EXERCISE: (56 minutes):    Exercises per grid below to improve mobility, strength, balance, and coordination. Required moderate visual, verbal, manual, and tactile cues to promote proper body alignment, promote proper body posture, promote proper body mechanics, and promote proper body breathing techniques. Progressed resistance, range, repetitions, and complexity of movement as indicated. Date:  11/16/22 Date:  11/21/2022 Date:  11/14/22   Activity/Exercise Parameters Parameters Parameters   NuStep 10 minutes Working on ROM 10 minutes Working on ROM 10 minutes Working on ROM   Heel Prop      Heel Slides  Seated Passive 3 x 10 Supine 3 x 10 with Strap   Quad Sets 10x 10 second hold, supine 10x 10 second hold, supine 10x 10 second hold, supine   Short Arc Quad  2x10 w/ 2 second hold, L,     Hamstring Stretch 3 x 60 sec Long Sitting 6 x 30 sec Long Sitting 3 x 30 Second with Strap   PROM   Passive Flexion 8 minutes   Calf stretch 6 x 30 sec Slant Board 6 x 30 sec Slant Board 6 x 30 sec Slant Board   3 x 15   3 x 10 Table with Red Thera Band   Walking Side Stepping 3 x 50 feet Bilateral UE Support Side Stepping 3 x 30 feet Bilateral UE Support     Side Stepping 3 x 30 feet Bilateral UE Support     Step Up Bilateral UE Support 6 inch 3 x 10  Bilateral UE Support 6 inch 3 x 10     Forward \"T\" Reach   Both legs,  Leg 2 x 10 Single UE Support to 12 inches    SLR   3 x 10   Clams   Hooklying Red Thera Band 3 x 20   Bridge   3 x 10 Red There Band   Knee Flexion Modified on Stairs 3 x 10 Each leg       Time spent with patient reviewing proper muscle recruitment and technique with exercises. MANUAL THERAPY: (0 minutes):   Joint mobilization and Soft tissue mobilization was utilized and necessary because of the patient's restricted joint motion, painful spasm, loss of articular motion, and restricted motion of soft tissue.    Patellar Mobilization All Directions Grade II/III  Soft Tissue Mobilization Quads, Hamstring, Adductors, Gastroc    MODALITIES: (0 minutes):        None today      HEP: As above; handouts given to patient for all exercises. Treatment/Session Summary:    Treatment Assessment:  Patient continues to improve in functional tolerance and ROM. She is able to WB in surgical LE while performing a hinge movement pattern. Patient denies aggravation of symptoms at end of session. She still reports knee stiffness. Communication/Consultation:  None today  Equipment provided today:  None today  Recommendations/Intent for next treatment session: Next visit will focus on advancements to more challenging activities to include progressing strength, functional mobility, pain tolerance, and ROM as tolerated.      Total Treatment Billable Duration:  56 minutes  Time In: 1430  Time Out: 629 [facility], PT       Charge Capture  }Post Session Pain  PT Visit [facility] Portal  MD Guidelines  Scanned Media  Benefits  MyChart    Future Appointments   Date Time Provider Catherine Roa   11/23/2022  2:30 PM Kelly Carty, PT [facility]   11/28/2022  2:30 PM Kelly Carty, PT [facility]

## 2022-11-23 ENCOUNTER — HOSPITAL ENCOUNTER (OUTPATIENT)
Dept: PHYSICAL THERAPY | Age: 67
Setting detail: RECURRING SERIES
Discharge: HOME | End: 2022-11-26
Payer: MEDICARE

## 2022-11-23 PROCEDURE — 97110 THERAPEUTIC EXERCISES: CPT

## 2022-11-23 ASSESSMENT — PAIN SCALES - GENERAL: PAINLEVEL_OUTOF10: 0

## 2022-11-23 NOTE — PROGRESS NOTES
Camilla Moore  : 1955  Primary: Rick Mullins Hmo  Secondary:  Sophie Reddy @ [address] [facility] [address]  Phone: [phone]  Fax: [phone] Plan Frequency: 2 sessions per week for 8 weeks    Plan of Care/Certification Expiration Date: 23      PT Visit Info:  No data recorded   Visit Count:  12   OUTPATIENT PHYSICAL THERAPY:OP NOTE TYPE: Treatment Note 2022       Episode  }Appt Desk             Treatment Diagnosis:  Pain in Left Knee (M25.562)  Stiffness of Left Knee, Not elsewhere classified (M25.662)  Difficulty in walking, Not elsewhere classified (R26.2)  Generalized Muscle Weakness (M62.81)Aftercare following joint replacement surgery (Z47.1)  Presence of left artificial knee joint (M63.131)   Medical/Referring Diagnosis:  Presence of left artificial knee joint [V33.032]  Referring Physician:  Madhuri Streeter MD MD Orders:  PT Eval and Treat   Date of Onset:  Onset Date: 22 (S/p L TKA on 22)     Allergies:   Benazepril and Erythromycin  Restrictions/Precautions:  Restrictions/Precautions: Weight Bearing  Left Lower Extremity Weight Bearing: Weight Bearing As Tolerated     Interventions Planned (Treatment may consist of any combination of the following):    Current Treatment Recommendations: Strengthening; ROM; Balance training; Functional mobility training; Transfer training; Endurance training; Gait training; Stair training; Neuromuscular re-education; Manual Therapy - Soft Tissue Mobilization; Manual Therapy - Joint Manipulation; Pain management; Home exercise program; Modalities; Integrated dry needling; Therapeutic activities     Subjective Comments:  Patient reports no pain but has a mild \"tightness\" at the lateral L knee.   Initial:}    0/10Post Session:       0/10  Medications Last Reviewed:  2022  Updated Objective Findings:   R LE knee varus    Treatment   THERAPEUTIC EXERCISE: (55 minutes): Exercises per grid below to improve mobility, strength, balance, and coordination. Required moderate visual, verbal, manual, and tactile cues to promote proper body alignment, promote proper body posture, promote proper body mechanics, and promote proper body breathing techniques. Progressed resistance, range, repetitions, and complexity of movement as indicated. Date:  11/16/22 Date:  11/21/2022 Date:  11/23/22   Activity/Exercise Parameters Parameters Parameters   NuStep 10 minutes Working on ROM 10 minutes Working on ROM 8 minutes Working on ROM   Heel Prop      Heel Slides  Seated Passive 3 x 10 2x15 w/ strap    Quad Sets 10x 10 second hold, supine 10x 10 second hold, supine    Short Arc Quad  2x10 w/ 2 second hold, L,     Hamstring Stretch 3 x 60 sec Long Sitting 6 x 30 sec Long Sitting 3 x 60 sec Long Sitting   PROM      Calf stretch 6 x 30 sec Slant Board 6 x 30 sec Slant Board 4x30 sec. On slant board   Heel raises   3x10   Hip Abduction   3x10 standing, L   Walking Side Stepping 3 x 50 feet Bilateral UE Support Side Stepping 3 x 30 feet Bilateral UE Support     3x30': cueing for heel-toe and upright posture    Step Up Bilateral UE Support 6 inch 3 x 10  Bilateral UE Support 6 inch 3 x 10  Stairs: x6; ascending and descending   Forward \"T\" Reach   Both legs,  Leg 2 x 10 Single UE Support to 12 inches    SLR      Clams      Bridge      Knee Flexion Modified on Stairs 3 x 10 Each leg       Time spent with patient reviewing proper muscle recruitment and technique with exercises. MANUAL THERAPY: (0 minutes):   Joint mobilization and Soft tissue mobilization was utilized and necessary because of the patient's restricted joint motion, painful spasm, loss of articular motion, and restricted motion of soft tissue.    Patellar Mobilization All Directions Grade II/III  Soft Tissue Mobilization Quads, Hamstring, Adductors, Gastroc    MODALITIES: (0 minutes):        None today      HEP: As above; handouts given to patient for all exercises. Treatment/Session Summary:    Treatment Assessment:  Patient reports L hip pain during session. She expresses feeling fatigued during and after session. Patient still ambulates with antalgic gait. Patient able to walk steadily without SPC and denies knee pain. Patient also completes stair training without aggravating symptoms. Communication/Consultation:  None today  Equipment provided today:  None today  Recommendations/Intent for next treatment session: Next visit will focus on advancements to more challenging activities to include progressing strength, functional mobility, pain tolerance, and ROM as tolerated.      Total Treatment Billable Duration:  55 minutes  Time In: 1430  Time Out: 629 [facility], PT       Charge Capture  }Post Session Pain  PT Visit [facility] Portal  MD Guidelines  Scanned Media  Benefits  MyChart    Future Appointments   Date Time Provider Catherine Roa   11/28/2022  2:30 PM Dick Vazquez PT [facility]

## 2022-11-28 ENCOUNTER — HOSPITAL ENCOUNTER (OUTPATIENT)
Dept: PHYSICAL THERAPY | Age: 67
Setting detail: RECURRING SERIES
Discharge: HOME | End: 2022-12-01
Payer: MEDICARE

## 2022-11-28 PROCEDURE — 97110 THERAPEUTIC EXERCISES: CPT

## 2022-11-28 ASSESSMENT — PAIN SCALES - GENERAL: PAINLEVEL_OUTOF10: 0

## 2022-11-28 NOTE — PROGRESS NOTES
Ousmane Hooks  : 1955  Primary: Cierra Concepcion Plus Hmo  Secondary:  [address] @ [address] [facility] [address]  Phone: [phone]  Fax: [phone] Plan Frequency: 2 sessions per week for 8 weeks    Plan of Care/Certification Expiration Date: 23      PT Visit Info:  No data recorded   Visit Count:  13   OUTPATIENT PHYSICAL THERAPY:OP NOTE TYPE: Treatment Note 2022       Episode  }Appt Desk             Treatment Diagnosis:  Pain in Left Knee (M25.562)  Stiffness of Left Knee, Not elsewhere classified (M25.662)  Difficulty in walking, Not elsewhere classified (R26.2)  Generalized Muscle Weakness (M62.81)Aftercare following joint replacement surgery (Z47.1)  Presence of left artificial knee joint (N64.201)   Medical/Referring Diagnosis:  Presence of left artificial knee joint [J69.301]  Referring Physician:  Dago Lawler MD MD Orders:  PT Eval and Treat   Date of Onset:  Onset Date: 22 (S/p L TKA on 22)     Allergies:   Benazepril and Erythromycin  Restrictions/Precautions:  Restrictions/Precautions: Weight Bearing  Left Lower Extremity Weight Bearing: Weight Bearing As Tolerated     Interventions Planned (Treatment may consist of any combination of the following):    Current Treatment Recommendations: Strengthening; ROM; Balance training; Functional mobility training; Transfer training; Endurance training; Gait training; Stair training; Neuromuscular re-education; Manual Therapy - Soft Tissue Mobilization; Manual Therapy - Joint Manipulation; Pain management; Home exercise program; Modalities; Integrated dry needling; Therapeutic activities     Subjective Comments:  Patient arrives to therapy with no complaints. She is compliant with her HEP.   Initial:}    0/10Post Session:       0/10  Medications Last Reviewed:  2022  Updated Objective Findings:   R knee flexion 111° Pre, 118° Post; extension +1°    Treatment THERAPEUTIC EXERCISE: (57 minutes):    Exercises per grid below to improve mobility, strength, balance, and coordination. Required moderate visual, verbal, manual, and tactile cues to promote proper body alignment, promote proper body posture, promote proper body mechanics, and promote proper body breathing techniques. Progressed resistance, range, repetitions, and complexity of movement as indicated. Date:  11/28/22 Date:  11/21/2022 Date:  11/23/22   Activity/Exercise Parameters Parameters Parameters   NuStep 10 minutes Working on ROM 10 minutes Working on ROM 8 minutes Working on ROM   Heel Prop --     Heel Slides 2x15 w/ strap  Seated Passive 3 x 10 2x15 w/ strap    Quad Sets 10x 10 second hold, supine 10x 10 second hold, supine    Short Arc Quad  2x10 w/ 2 second hold, L,     Hamstring Stretch 3 x 60 sec Long Sitting 6 x 30 sec Long Sitting 3 x 60 sec Long Sitting   PROM --     Calf stretch 6 x 30 sec Slant Board 6 x 30 sec Slant Board 4x30 sec. On slant board   Heel raises 3x15  3x10   Hip Abduction 2x15, standing  3x10 standing, L   Walking Side Stepping 3 x 50 feet Bilateral UE Support Side Stepping 3 x 30 feet Bilateral UE Support     3x30': cueing for heel-toe and upright posture    Step Up Bilateral UE Support 6 inch 3 x 10  Bilateral UE Support 6 inch 3 x 10  Stairs: x6; ascending and descending   Forward \"T\" Reach   Both legs,  Leg 2 x 10 Single UE Support to 12 inches    SLR      Clams      Bridge      Knee Flexion LAQ w/ MET: x10, 3 seconds hold     Gait 4x15, forward and backwards       Time spent with patient reviewing proper muscle recruitment and technique with exercises. MANUAL THERAPY: (0 minutes):   Joint mobilization and Soft tissue mobilization was utilized and necessary because of the patient's restricted joint motion, painful spasm, loss of articular motion, and restricted motion of soft tissue.    Patellar Mobilization All Directions Grade II/III  Soft Tissue Mobilization Quads, Hamstring, Adductors, Gastroc    MODALITIES: (0 minutes):        None today      HEP: As above; handouts given to patient for all exercises. Treatment/Session Summary:    Treatment Assessment:  Patient ambulates forward and backwards without aggravating symptoms. She still ambulates with an antalgic gait and lateral weight shift. Patient continues to gradually improve her mobility bit still presents with limited L knee flexion. Communication/Consultation:  None today  Equipment provided today:  New home exercise  Recommendations/Intent for next treatment session: Next visit will focus on advancements to more challenging activities to include progressing strength, functional mobility, pain tolerance, and ROM as tolerated.      Total Treatment Billable Duration:  57 minutes  Time In: 1430  Time Out: 629 [facility], PT       Charge Capture  }Post Session Pain  PT Visit [address] [facility] Portal  MD Guidelines  Scanned Media  Benefits  MyChart    Future Appointments   Date Time Provider Catherine Roa   12/2/2022  2:30 PM Jose Correa, PT [facility]   12/5/2022  1:30 PM Marguerite Prieto PT [facility]   12/7/2022  2:30 PM [facility]

## 2022-11-29 DIAGNOSIS — M17.11 PRIMARY OSTEOARTHRITIS OF RIGHT KNEE: Primary | ICD-10-CM

## 2022-12-02 ENCOUNTER — HOSPITAL ENCOUNTER (OUTPATIENT)
Dept: PHYSICAL THERAPY | Age: 67
Setting detail: RECURRING SERIES
Discharge: HOME | End: 2022-12-05
Payer: MEDICARE

## 2022-12-02 PROCEDURE — 97110 THERAPEUTIC EXERCISES: CPT

## 2022-12-02 PROCEDURE — 97140 MANUAL THERAPY 1/> REGIONS: CPT

## 2022-12-02 ASSESSMENT — PAIN SCALES - GENERAL: PAINLEVEL_OUTOF10: 0

## 2022-12-02 NOTE — PROGRESS NOTES
Jd Jarquin  : 1955  Primary: [facility]  Secondary:  [address] @ [address] [facility] [address]  Phone: [phone]  Fax: [phone] Plan Frequency: 2 sessions per week for 8 weeks    Plan of Care/Certification Expiration Date: 23      PT Visit Info:  No data recorded   Visit Count:  14   OUTPATIENT PHYSICAL THERAPY:OP NOTE TYPE: Treatment Note 2022       Episode  }Appt Desk             Treatment Diagnosis:  Pain in Left Knee (M25.562)  Stiffness of Left Knee, Not elsewhere classified (M25.662)  Difficulty in walking, Not elsewhere classified (R26.2)  Generalized Muscle Weakness (M62.81)Aftercare following joint replacement surgery (Z47.1)  Presence of left artificial knee joint (T63.609)   Medical/Referring Diagnosis:  Presence of left artificial knee joint [Z36.168]  Referring Physician:  Martina Valenzuela MD MD Orders:  PT Eval and Treat   Date of Onset:  Onset Date: 22 (S/p L TKA on 22)     Allergies:   Benazepril and Erythromycin  Restrictions/Precautions:  Restrictions/Precautions: Weight Bearing  Left Lower Extremity Weight Bearing: Weight Bearing As Tolerated     Interventions Planned (Treatment may consist of any combination of the following):    Current Treatment Recommendations: Strengthening; ROM; Balance training; Functional mobility training; Transfer training; Endurance training; Gait training; Stair training; Neuromuscular re-education; Manual Therapy - Soft Tissue Mobilization; Manual Therapy - Joint Manipulation; Pain management; Home exercise program; Modalities; Integrated dry needling;  Therapeutic activities     Subjective Comments:  Patient arrives with L knee stiffness and R knee pain  Initial:}    0/10Post Session:       0/10  Medications Last Reviewed:  2022  Updated Objective Findings:   R knee flexion 114° Post; extension +0°    Treatment   THERAPEUTIC EXERCISE: (47 minutes): Exercises per grid below to improve mobility, strength, balance, and coordination. Required moderate visual, verbal, manual, and tactile cues to promote proper body alignment, promote proper body posture, promote proper body mechanics, and promote proper body breathing techniques. Progressed resistance, range, repetitions, and complexity of movement as indicated. Date:  11/28/22 Date:  12/2/2022 Date:  11/23/22   Activity/Exercise Parameters Parameters Parameters   NuStep 10 minutes Working on ROM 8 minutes Working on ROM 8 minutes Working on ROM   Heel Prop --     Heel Slides 2x15 w/ strap  2x15 w/ strap  2x15 w/ strap    Quad Sets 10x 10 second hold, supine     Sit to Stands  3x10    Hamstring Stretch 3 x 60 sec Long Sitting 3 x 60 sec Long Sitting 3 x 60 sec Long Sitting   PROM -- X20, into flexion and extension with overpressure    Calf stretch 6 x 30 sec Slant Board  4x30 sec. On slant board   Heel raises 3x15  3x10   Hip Abduction 2x15, standing  3x10 standing, L   Walking Side Stepping 3 x 50 feet Bilateral UE Support  3x30': cueing for heel-toe and upright posture    Step Up Bilateral UE Support 6 inch 3 x 10   Stairs: x6; ascending and descending   Forward \"T\" Reach       SLR  3x10, L    Knee Flexion LAQ w/ MET: x10, 3 seconds hold     Gait 4x15, forward and backwards 6x15' forward and backwards      Time spent with patient reviewing proper muscle recruitment and technique with exercises. MANUAL THERAPY: (11 minutes):   Joint mobilization and Soft tissue mobilization was utilized and necessary because of the patient's restricted joint motion, painful spasm, loss of articular motion, and restricted motion of soft tissue. Patellar Mobilization All Directions Grade II/III  Soft Tissue Mobilization Quads, Hamstring, Adductors, Gastroc   MODALITIES: (0 minutes):        None today      HEP: As above; handouts given to patient for all exercises.       Treatment/Session Summary:    Treatment Assessment:  Patient continues to gradually progress in mobility. She has made adequate improvements in ROM, especially when compared to her non-surgical LE. She denies pain with increased intensity of interventions. Patient still ambulates with antalgic gait and with SPC. Communication/Consultation:  None today  Equipment provided today:  New home exercise  Recommendations/Intent for next treatment session: Next visit will focus on advancements to more challenging activities to include progressing strength, functional mobility, pain tolerance, and ROM as tolerated.      Total Treatment Billable Duration:  58 minutes  Time In: 1430  Time Out: [address] [facility], PT       Charge Capture  }Post Session Pain  PT Visit Sahil Doan MD Guidelines  Scanned Media  Benefits  MyChart    Future Appointments   Date Time Provider Catherine Roa   12/5/2022  1:30 PM Shon Wilkerson, [facility] SFO   12/7/2022  2:30 PM Vinh Wong [facility] SFO   1/9/2023  7:30 AM SFE ASSESSMENT  06 SFEORPA SFE   1/13/2023  9:45 AM Pee Hou MD PO[facility] AMB   2/20/2023  1:10 PM Pee Hou MD POAG [facility] AMB

## 2022-12-05 ENCOUNTER — HOSPITAL ENCOUNTER (OUTPATIENT)
Dept: PHYSICAL THERAPY | Age: 67
Setting detail: RECURRING SERIES
Discharge: HOME | End: 2022-12-08
Payer: MEDICARE

## 2022-12-05 PROCEDURE — 97140 MANUAL THERAPY 1/> REGIONS: CPT

## 2022-12-05 PROCEDURE — 97110 THERAPEUTIC EXERCISES: CPT

## 2022-12-05 ASSESSMENT — PAIN SCALES - GENERAL: PAINLEVEL_OUTOF10: 0

## 2022-12-05 NOTE — PROGRESS NOTES
Fay Marmolejo  : 1955  Primary: [facility]  Secondary:  [address] @ [address] [place] [address]  Phone: [phone]  Fax: [phone] Plan Frequency: 2 sessions per week for 8 weeks    Plan of Care/Certification Expiration Date: 23      PT Visit Info:  No data recorded   Visit Count:  15   OUTPATIENT PHYSICAL THERAPY:OP NOTE TYPE: Treatment Note 2022       Episode  }Appt Desk             Treatment Diagnosis:  Pain in Left Knee (M25.562)  Stiffness of Left Knee, Not elsewhere classified (M25.662)  Difficulty in walking, Not elsewhere classified (R26.2)  Generalized Muscle Weakness (M62.81)Aftercare following joint replacement surgery (Z47.1)  Presence of left artificial knee joint (G16.633)   Medical/Referring Diagnosis:  Presence of left artificial knee joint [O34.361]  Referring Physician:  Vance Trimble MD MD Orders:  PT Eval and Treat   Date of Onset:  Onset Date: 22 (S/p L TKA on 22)     Allergies:   Benazepril and Erythromycin  Restrictions/Precautions:  Restrictions/Precautions: Weight Bearing  Left Lower Extremity Weight Bearing: Weight Bearing As Tolerated     Interventions Planned (Treatment may consist of any combination of the following):    Current Treatment Recommendations: Strengthening; ROM; Balance training; Functional mobility training; Transfer training; Endurance training; Gait training; Stair training; Neuromuscular re-education; Manual Therapy - Soft Tissue Mobilization; Manual Therapy - Joint Manipulation; Pain management; Home exercise program; Modalities; Integrated dry needling;  Therapeutic activities     Subjective Comments:  Pt. reported having more tightness than pain  Initial:}    0/10Post Session:       0/10  Medications Last Reviewed:  2022  Updated Objective Findings:   Right knee range of motion 0-120 degrees    Treatment   THERAPEUTIC EXERCISE: (45 minutes): Exercises per grid below to improve mobility, strength, balance, and coordination. Required moderate visual, verbal, manual, and tactile cues to promote proper body alignment, promote proper body posture, promote proper body mechanics, and promote proper body breathing techniques. Progressed resistance, range, repetitions, and complexity of movement as indicated. Date:  11/28/22 Date:  12/2/2022 Date:  12/5/22   Activity/Exercise Parameters Parameters Parameters   NuStep 10 minutes Working on ROM 8 minutes Working on ROM X 10 minutes Working    Heel Prop --     Heel Slides 2x15 w/ strap  2x15 w/ strap  2x15 w/ strap    Quad Sets 10x 10 second hold, supine  X 10 reps x 10 sec hold    Sit to Stands  3x10 3x10 reps    Hamstring Stretch 3 x 60 sec Long Sitting 3 x 60 sec Long Sitting 3 x 60 sec Long Sitting   PROM -- X20, into flexion and extension with overpressure    Calf stretch 6 x 30 sec Slant Board  4x30 sec. On slant board   Heel raises 3x15  3x10   Hip Abduction 2x15, standing  3x10 standing, L   Walking Side Stepping 3 x 50 feet Bilateral UE Support  3x30': cueing for heel-toe and upright posture    Step Up Bilateral UE Support 6 inch 3 x 10   Stairs: x6; ascending and descending   Forward \"T\" Reach       SLR  3x10, L 3x10    Knee Flexion LAQ w/ MET: x10, 3 seconds hold  X 30 reps    Gait 4x15, forward and backwards 6x15' forward and backwards 6x15 ft. Fwd & bkwds      Time spent with patient reviewing proper muscle recruitment and technique with exercises. MANUAL THERAPY: (10 minutes):   Joint mobilization and Soft tissue mobilization was utilized and necessary because of the patient's restricted joint motion, painful spasm, loss of articular motion, and restricted motion of soft tissue.    Patellar Mobilization All Directions Grade II/III  Soft Tissue Mobilization Quads, Hamstring, Adductors, Gastroc   MODALITIES: (0 minutes):        None today      HEP: As above; handouts given to patient for all exercises. Treatment/Session Summary:    Treatment Assessment:  Pt. was compliant with all exercises and demonstrating better gait and range of motion today  Communication/Consultation:  None today  Equipment provided today:  New home exercise  Recommendations/Intent for next treatment session: Next visit will focus on advancements to more challenging activities to include progressing strength, functional mobility, pain tolerance, and ROM as tolerated.      Total Treatment Billable Duration:  58 minutes  Time In: 1330  Time Out: [address], PTA       Charge Capture  }Post Session Pain  PT Visit Info  [employer] Portal  MD Guidelines  Scanned Media  Benefits  MyChart    Future Appointments   Date Time Provider Catherine Roa   12/7/2022  2:30 PM Javad Higginbotham, PT [facility]   1/9/2023  7:30 AM SFE ASSESSMENT  06 SFEORPA SFE   1/13/2023  9:45 AM MD THERON Pham [facility] AMB   2/20/2023  1:10 PM Dominic Byrd MD [facility] GVL AMB

## 2022-12-07 ENCOUNTER — HOSPITAL ENCOUNTER (OUTPATIENT)
Dept: PHYSICAL THERAPY | Age: 67
Setting detail: RECURRING SERIES
Discharge: HOME | End: 2022-12-10
Payer: MEDICARE

## 2022-12-07 PROCEDURE — 97110 THERAPEUTIC EXERCISES: CPT

## 2022-12-07 ASSESSMENT — PAIN SCALES - GENERAL: PAINLEVEL_OUTOF10: 0

## 2022-12-07 NOTE — DISCHARGE SUMMARY
Nathalie Ravi  : 1955  Primary: [facility]  Secondary:  [address] @ [address]  [address]  Phone: [phone]  Fax: [phone] Plan Frequency: Patient to be discharged at this time    Plan of Care/Certification Expiration Date: 23      PT Visit Info:         Visit Count:  16    OUTPATIENT PHYSICAL THERAPY:OP NOTE TYPE: Discharge Summary 2022               Episode  Appt Desk         Treatment Diagnosis:  Pain in Left Knee (M25.562)  Stiffness of Left Knee, Not elsewhere classified (M25.662)  Difficulty in walking, Not elsewhere classified (R26.2)  Generalized Muscle Weakness (M62.81)Aftercare following joint replacement surgery (Z47.1)  Presence of left artificial knee joint (X61.683)   Medical/Referring Diagnosis:  Presence of left artificial knee joint [O14.788]  Referring Physician:  Chasity Ramsey MD MD Orders:  PT Eval and Treat   Return MD Appt:  10/10/22  Date of Onset:  Onset Date: 22 (S/p L TKA on 22)     Allergies:  Benazepril and Erythromycin  Restrictions/Precautions:    Restrictions/Precautions: Weight Bearing  Left Lower Extremity Weight Bearing: Weight Bearing As Tolerated     Medications Last Reviewed:  2022          OBJECTIVE       ROM Date:  2022    KNEE ROM (TESTED IN SUPINE)    RIGHT LEFT   Flexion 103° 115°   Extension -4° +1°     PALPATION/TONE/TISSUE TEXTURE:   Date:   2022    SOFT TISSUE:   Quads Unremarkable    Hamstrings Increased tone noted at hamstring insertion   TFL/IT band Unremarkable    Gastroc/soleus/post tib Unremarkable     Skin integrity   Incision site remains free from signs of infection       STRENGTH   Date: 2022      Right Left   Hip Abduction NT NT   Hip IR 5 from 4+  5   Hip ER 5 5   Hip Flexion 5 5   Knee Extension 5 5   Knee Flexion 5 5       EDEMA Date:  2022       Activity/Exercise Left  Right   Mid patella  47 43 ASSESSMENT   Initial Assessment:    Ms. Nancy Pop has attended 1 physical therapy session including initial evaluation as of 10/3/2022. Patient is a 69 y.o. reporting to physical therapy with L knee pain and stiffness secondary to a L TKA on 9/6/22. She reports L knee pain, stiffness, and throbbing. She states her pain usually presents in the anterior medial knee and it lasts 10 minutes at a time. The patient has been compliant with her home health physical therapy exercises. She is a retired  and did not exercise prior to her surgery. She uses a wheeled walker most of the time and reports using a single point cane at home occasionally. She reports sleeping well and maintains her heel propped. She is aware and compliant with all s/p TKA precautions. She is unable to stand for long durations nor walk for great distances. Nancy Pop will benefit from home exercise program, therapeutic and postural strengthening exercises, manual therapeutic techniques as appropriate to address Anjana Luke's current condition. Nancy Pop will benefit from skilled PT (medically necessary) to address above deficits affecting participation in basic ADLs and overall functional tolerance. Progress Note 11/2/2022:  Ms. Nancy Pop has attended 7 physical therapy sessions including initial evaluation as of 10/3/2022. She reports feeling 40% better since starting therapy. The patient has demonstrated improvements in ROM. Strength, functional mobility, and pain tolerance. She is now able to walk independently without AD. She still hurts when standing still. The HEP and ice are currently helping her symptoms. The patient also reports sleeping better. She still presents with decreased ROM, weakness, abnormal posture and gait deviations,  and increased pain. She has met 6 of her goals since starting physical therapy.  Nancy Pop will continue to benefit from skilled PT (medically necessary) to address above deficits affecting participation in basic ADLs and overall functional tolerance. Discharge Summary 12/7/2022:   Ms. Swapnil Bills has attended 17 physical therapy sessions including initial evaluation as of 10/3/2022. The patient has demonstrated improvements in ROM, strength, edema, joint stability, gait, independence from AD, and pain tolerance. She still needs improvements in knee flexion of the surgical knee. She also still has difficulty with ambulating stairs. Her HEP will assist her in reaching her remaining goals. The patient has met 9 out of 10 of her goals. She improved her LEFS score to a 66/80 from a 14/80. Patient is appropriate and agreeable to be discharged at this time. Patient will continue with independent HEP which we discussed. PLAN   Effective Dates: 10/6/2022 TO Plan of Care/Certification Expiration Date: 01/04/23     Frequency/Duration: Plan Frequency: Patient to be discharged at this time          GOALS: (Goals have been discussed and agreed upon with patient.)  Short Term Goals 4 weeks   Swapnil Bills will be independent with HEP to promote self-management of symptoms. GOAL MET 11/2/2022   Swapnil Bills will perform Nu Step x 10 minutes for hip and knee AAROM. GOAL MET 11/2/2022   Swapnil Bills will tolerate manual therapy/joint mobilizations to increase knee flexion ROM so patient can ambulate stairs and walk with a more normalized gait pattern. GOAL MET 11/2/2022   Swapnil Bills will participate in static and dynamic balance activities for 5 minutes to help improve proprioception and decrease risk of falls. GOAL MET 12/7/2022   Swapnil Bills will demonstrate L knee extension >= 0 degrees to improve functional mobility and tolerance of ADLs.  GOAL MET 11/2/2022     Long Term Goals 12 weeks  Swapnil Bills will be able to perform 10x sit to stand transfers independently with increased knee flexion and decreased use of upper extremities. GOAL MET 12/7/2022   Ousmane Hooks will ascend/descend 5 steps with reciprocal gait pattern and rail. NOT MET  Bobbieric Neva will improve MMT B LE to >=4+/5 to improve current level of independence and community reintegration GOAL MET 11/2/2022   Charlyana Neva will demonstrate L knee flexion >= 110 degrees to improve functional mobility and tolerance of ADLs. GOAL MET 11/2/2022   Ousmane Hooks will increase their score on the Lower Extremity Functional Scale by 10 points from their initial score to show improvement in areas of difficulty. GOAL MET 12/7/2022            Outcome Measure: Tool Used: Lower Extremity Functional Scale (LEFS)  Score:  Initial: 14/80 Most Recent: 66/80 (Date: 12/7/2022 )   Interpretation of Score: 20 questions each scored on a 5 point scale with 0 representing \"extreme difficulty or unable to perform\" and 4 representing \"no difficulty\". The lower the score, the greater the functional disability. 80/80 represents no disability. Minimal detectable change is 9 points. Total Duration:  Time In: 1430  Time Out: 1530    Regarding Anjana Luke's therapy, I certify that the treatment plan above will be carried out by a therapist or under their direction. Thank you for this referral,  Tomasa Escalona, PT     Referring Physician Signature: Dago Lawler MD No Signature is Required for this note.         Post Session Pain  Charge Capture  PT Visit Info MD Guidelines  [place]

## 2022-12-07 NOTE — PROGRESS NOTES
Terrie Rey  : 1955  Primary: [facility]  Secondary:  [address] @ [address] [place] [address]  Phone: [phone]  Fax: [phone] Plan Frequency: Patient to be discharged at this time    Plan of Care/Certification Expiration Date: 23      PT Visit Info:  No data recorded   Visit Count:  16   OUTPATIENT PHYSICAL THERAPY:OP NOTE TYPE: Treatment Note 2022       Episode  }Appt Desk             Treatment Diagnosis:  Pain in Left Knee (M25.562)  Stiffness of Left Knee, Not elsewhere classified (M25.662)  Difficulty in walking, Not elsewhere classified (R26.2)  Generalized Muscle Weakness (M62.81)Aftercare following joint replacement surgery (Z47.1)  Presence of left artificial knee joint (B70.177)   Medical/Referring Diagnosis:  Presence of left artificial knee joint [T70.709]  Referring Physician:  Radha Cesar MD MD Orders:  PT Eval and Treat   Date of Onset:  Onset Date: 22 (S/p L TKA on 22)     Allergies:   Benazepril and Erythromycin  Restrictions/Precautions:  Restrictions/Precautions: Weight Bearing  Left Lower Extremity Weight Bearing: Weight Bearing As Tolerated     Interventions Planned (Treatment may consist of any combination of the following):    Current Treatment Recommendations: Strengthening; ROM; Balance training; Functional mobility training; Transfer training; Endurance training; Gait training; Stair training; Neuromuscular re-education; Manual Therapy - Soft Tissue Mobilization; Manual Therapy - Joint Manipulation; Pain management; Home exercise program; Modalities; Integrated dry needling;  Therapeutic activities     Subjective Comments:  Patient denies knee pain today  Initial:}    0/10Post Session:       0/10  Medications Last Reviewed:  2022  Updated Objective Findings:   See discharge note from today      Treatment   THERAPEUTIC EXERCISE: (55 minutes):    Exercises per grid below to improve mobility, strength, balance, and coordination. Required moderate visual, verbal, manual, and tactile cues to promote proper body alignment, promote proper body posture, promote proper body mechanics, and promote proper body breathing techniques. Progressed resistance, range, repetitions, and complexity of movement as indicated. Date:  12/7/22 Date:  12/2/2022 Date:  12/5/22   Activity/Exercise Parameters Parameters Parameters   NuStep 8 minutes Working on ROM 8 minutes Working on ROM X 10 minutes Working    Heel Prop --     Heel Slides 2x15 w/ strap  2x15 w/ strap  2x15 w/ strap    Quad Sets 10x 10 second hold, supine  X 10 reps x 10 sec hold    Sit to Stands  3x10 3x10 reps    Hamstring Stretch 3 x 60 sec Long Sitting 3 x 60 sec Long Sitting 3 x 60 sec Long Sitting   Hip flexion 3x10, each side     PROM -- X20, into flexion and extension with overpressure    Calf stretch 6 x 30 sec Slant Board  4x30 sec. On slant board   Heel raises 3x15  3x10   Hip Abduction 2x15, standing  3x10 standing, L   Walking Side Stepping 3 x 50 feet Bilateral UE Support  3x30': cueing for heel-toe and upright posture    Step Up Bilateral UE Support 6 inch 3 x 10   Stairs: x6; ascending and descending   Forward \"T\" Reach       SLR  3x10, L 3x10    Knee Flexion LAQ w/ MET: x10, 3 seconds hold  X 30 reps    Shuttle 3x10; 75 lbs. BLE     Gait 4x15, forward and backwards 6x15' forward and backwards 6x15 ft. Fwd & bkwds      Time spent with patient reviewing proper muscle recruitment and technique with exercises. HEP: As above; handouts given to patient for all exercises.       Treatment/Session Summary:    Treatment Assessment:  See discharge note from today  Communication/Consultation:  None today  Equipment provided today:  New home exercise  Recommendations/Intent for next treatment session: Next visit will focus on advancements to more challenging activities to include progressing strength, functional mobility, pain tolerance, and ROM as tolerated.      Total Treatment Billable Duration:  55 minutes  Time In: 1430  Time Out: 629 [facility], PT       Charge Capture  }Post Session Pain  PT Visit [address] Portal  MD Guidelines  Scanned Media  Benefits  MyChart    Future Appointments   Date Time Provider Catherine Roa   12/13/2022  1:00 PM [place] [facility] SLEEP STUDY [facility] SFD   1/9/2023  7:30 AM SFE ASSESSMENT RM 06 SFEORPA SFE   1/13/2023  9:45 AM MD THERON Christensen AMB   2/20/2023  1:10 PM MD BIJAN Christensen GVL AMB

## 2022-12-13 ENCOUNTER — HOSPITAL ENCOUNTER (OUTPATIENT)
Dept: SLEEP CENTER | Age: 67
Discharge: HOME | End: 2022-12-16

## 2023-01-04 ENCOUNTER — PREP FOR PROCEDURE (OUTPATIENT)
Dept: ORTHOPEDIC SURGERY | Age: 68
End: 2023-01-04

## 2023-01-04 DIAGNOSIS — M17.11 PRIMARY OSTEOARTHRITIS OF RIGHT KNEE: Primary | ICD-10-CM

## 2023-01-04 RX ORDER — ACETAMINOPHEN 325 MG/1
1000 TABLET ORAL ONCE
Status: CANCELLED | OUTPATIENT
Start: 2023-01-04 | End: 2023-01-04

## 2023-01-11 ENCOUNTER — HOSPITAL ENCOUNTER (OUTPATIENT)
Dept: SURGERY | Age: 68
Discharge: HOME | End: 2023-01-14
Payer: MEDICARE

## 2023-01-11 VITALS
HEIGHT: 62 IN | WEIGHT: 222.22 LBS | HEART RATE: 80 BPM | OXYGEN SATURATION: 97 % | RESPIRATION RATE: 16 BRPM | SYSTOLIC BLOOD PRESSURE: 202 MMHG | TEMPERATURE: 97.9 F | BODY MASS INDEX: 40.89 KG/M2 | DIASTOLIC BLOOD PRESSURE: 113 MMHG

## 2023-01-11 DIAGNOSIS — M17.11 PRIMARY OSTEOARTHRITIS OF RIGHT KNEE: ICD-10-CM

## 2023-01-11 LAB
ANION GAP SERPL CALC-SCNC: 0 MMOL/L (ref 2–11)
APTT PPP: 27.4 SEC (ref 24.5–34.2)
BACTERIA SPEC CULT: NORMAL
BASOPHILS # BLD: 0 K/UL (ref 0–0.2)
BASOPHILS NFR BLD: 1 % (ref 0–2)
BUN SERPL-MCNC: 16 MG/DL (ref 8–23)
CALCIUM SERPL-MCNC: 10.2 MG/DL (ref 8.3–10.4)
CHLORIDE SERPL-SCNC: 109 MMOL/L (ref 101–110)
CO2 SERPL-SCNC: 28 MMOL/L (ref 21–32)
CREAT SERPL-MCNC: 0.82 MG/DL (ref 0.6–1)
DIFFERENTIAL METHOD BLD: ABNORMAL
EOSINOPHIL # BLD: 0.4 K/UL (ref 0–0.8)
EOSINOPHIL NFR BLD: 7 % (ref 0.5–7.8)
ERYTHROCYTE [DISTWIDTH] IN BLOOD BY AUTOMATED COUNT: 16.5 % (ref 11.9–14.6)
EST. AVERAGE GLUCOSE BLD GHB EST-MCNC: 160 MG/DL
GLUCOSE SERPL-MCNC: 119 MG/DL (ref 65–100)
HBA1C MFR BLD: 7.2 % (ref 4.8–5.6)
HCT VFR BLD AUTO: 43.7 % (ref 35.8–46.3)
HGB BLD-MCNC: 13.9 G/DL (ref 11.7–15.4)
IMM GRANULOCYTES # BLD AUTO: 0 K/UL (ref 0–0.5)
IMM GRANULOCYTES NFR BLD AUTO: 0 % (ref 0–5)
INR PPP: 0.9
LYMPHOCYTES # BLD: 1.5 K/UL (ref 0.5–4.6)
LYMPHOCYTES NFR BLD: 23 % (ref 13–44)
MCH RBC QN AUTO: 27.9 PG (ref 26.1–32.9)
MCHC RBC AUTO-ENTMCNC: 31.8 G/DL (ref 31.4–35)
MCV RBC AUTO: 87.6 FL (ref 82–102)
MONOCYTES # BLD: 0.8 K/UL (ref 0.1–1.3)
MONOCYTES NFR BLD: 12 % (ref 4–12)
NEUTS SEG # BLD: 3.7 K/UL (ref 1.7–8.2)
NEUTS SEG NFR BLD: 58 % (ref 43–78)
NRBC # BLD: 0 K/UL (ref 0–0.2)
PLATELET # BLD AUTO: 333 K/UL (ref 150–450)
PMV BLD AUTO: 10.5 FL (ref 9.4–12.3)
POTASSIUM SERPL-SCNC: 3.5 MMOL/L (ref 3.5–5.1)
PROTHROMBIN TIME: 12.5 SEC (ref 12.6–14.3)
RBC # BLD AUTO: 4.99 M/UL (ref 4.05–5.2)
SERVICE CMNT-IMP: NORMAL
SODIUM SERPL-SCNC: 137 MMOL/L (ref 133–143)
WBC # BLD AUTO: 6.5 K/UL (ref 4.3–11.1)

## 2023-01-11 PROCEDURE — 83036 HEMOGLOBIN GLYCOSYLATED A1C: CPT

## 2023-01-11 PROCEDURE — 85610 PROTHROMBIN TIME: CPT

## 2023-01-11 PROCEDURE — 85730 THROMBOPLASTIN TIME PARTIAL: CPT

## 2023-01-11 PROCEDURE — 85025 COMPLETE CBC W/AUTO DIFF WBC: CPT

## 2023-01-11 PROCEDURE — 87641 MR-STAPH DNA AMP PROBE: CPT

## 2023-01-11 PROCEDURE — 80048 BASIC METABOLIC PNL TOTAL CA: CPT

## 2023-01-11 RX ORDER — MELOXICAM 7.5 MG/1
7.5 TABLET ORAL DAILY
COMMUNITY

## 2023-01-11 ASSESSMENT — PAIN - FUNCTIONAL ASSESSMENT: PAIN_FUNCTIONAL_ASSESSMENT: PREVENTS OR INTERFERES SOME ACTIVE ACTIVITIES AND ADLS

## 2023-01-11 ASSESSMENT — PAIN DESCRIPTION - DESCRIPTORS: DESCRIPTORS: ACHING

## 2023-01-11 ASSESSMENT — PAIN DESCRIPTION - LOCATION: LOCATION: KNEE

## 2023-01-11 ASSESSMENT — PAIN SCALES - GENERAL: PAINLEVEL_OUTOF10: 5

## 2023-01-11 ASSESSMENT — PAIN DESCRIPTION - ORIENTATION: ORIENTATION: RIGHT

## 2023-01-11 NOTE — PROGRESS NOTES
HST RESULTS FROM 12/23/2022 GIVEN TO PT. PT STATES EQUIPMENT STOPPED WORKING IN THE MIDDLE OF THE NIGHT. PT DID SHOW DESATURATIONS TO 84% AND EVIDENCE OF OBSTRUCTIONS. PT STATED THAT DURING HER HOSPITALIZATION SHE HAD TO HAVE OXYGEN AGAIN WHEN ASLEEP. BS CLEAR CURRENTLY NECK SIZE 48 cm.

## 2023-01-11 NOTE — PERIOP NOTE
Patient verified name and . Order for consent found in EHR and matches case posting; patient verified. Type 3 surgery, PAT walk in assessment complete. Labs per surgeon: CBC,BMP, PT/PTT, HgbA1C; results pending. Labs per anesthesia protocol: no additional lab work needed. EKG:Found in EHR dated 22- interpretation is NSR and within anesthesia guidelines. Dr. Uriah Villegas reviewed chart on 23 and ok'd pt for prior surgery. B/P today- 202/113- pt states she had not taken her B/p medications today. B/p at PCP office visit on 22 was 124/78. Will have anesthesia review. MRSA/MSSA swab collected; pharmacy to review and dose antibiotic as appropriate. Hospital approved surgical skin cleanser and instructions to return bottle on DOS given per hospital policy. Patient provided with handouts including Guide to Surgery, Pain Management, Hand Hygiene, Blood Transfusion Education, and [facility] Brochure. Patient answered medical/surgical history questions at their best of ability. All prior to admission medications documented in [facility]. Original medication prescription bottle not visualized during patient appointment. Patient instructed to hold all vitamins 3 weeks prior to surgery and NSAIDS 5 days prior to surgery. Patient teach back successful and patient demonstrates knowledge of instruction.

## 2023-01-11 NOTE — PERIOP NOTE
How to Use Your Incentive Spirometer       About Your Incentive Spirometer  An incentive spirometer is a device that will expand your lungs by helping you to breathe more deeply and fully. The parts of your incentive spirometer are labeled in Figure 1. Using your incentive spirometer  When you're using your incentive spirometer, make sure to breathe through your mouth. If you breathe through your nose, the incentive spirometer won't work properly. You can hold your nose if you have trouble. DO NOT BLOW INTO THE DEVICE. If you feel dizzy at any time, stop and rest. Try again at a later time. Sit upright in a chair or in bed. Hold the incentive spirometer at eye level. Put the mouthpiece in your mouth and close your lips tightly around it. Slowly breathe out (exhale) completely. Breathe in (inhale) slowly through your mouth as deeply as you can. As you take the breath, you will see the piston rise inside the large column. While the piston rises, the indicator on the right should move upwards. It should stay in between the 2 arrows (see Figure 1). Try to get the piston as high as you can, while keeping the indicator between the arrows. If the indicator doesn't stay between the arrows, you're breathing either too fast or too slow. When you get it as high as you can, hold your breath for 10 seconds, or as long as possible. While you're holding your breath, the piston will slowly fall to the base of the spirometer. Once the piston reaches the bottom of the spirometer, breathe out slowly through your mouth. Rest for a few seconds. Repeat 10 times. Try to get the piston to the same level with each breath. After each set of 10 breaths, try to cough as coughing will help loosen or clear any mucus in your lungs. Put the marker at the level the piston reached on your incentive spirometer. This will be your goal next time. Repeat these steps every hour that you're awake.   Cover the mouthpiece of the incentive spirometer when you aren't using it

## 2023-01-11 NOTE — PERIOP NOTE
PLEASE CONTINUE TAKING ALL PRESCRIPTION MEDICATIONS UP TO THE DAY OF SURGERY UNLESS OTHERWISE DIRECTED BELOW. DISCONTINUE all vitamins, herbals and supplements 21 days prior to surgery. DISCONTINUE Non-Steriodal Anti-Inflammatory (NSAIDS) such as Advil, Ibuprofen, Motrin, Naproxen and Aleve 5 days prior to surgery. Home Medications to take  the day of surgery    Albuterol inhaler, amlodipine, carvedilol, duloxetine, flonase, Ativan if needed            Home Medications   to Hold   Stop vitamins and supplements now    Stop meloxicam five days prior to surgery      Comments   Take 1 capful of Miralax daily starting one week prior to surgery. On the day before surgery please take Acetaminophen 1000mg in the morning and then again before bed. You may substitute for Tylenol 650 mg.    Bring inhaler, Dynahex wash and Incentive Spirometer with you to hospital on the day of surgery. Please do not bring home medications with you on the day of surgery unless otherwise directed by your nurse. If you are instructed to bring home medications, please give them to your nurse as they will be administered by the nursing staff. If you have any questions, please call Yue Figueroa [phone]. A copy of this note was provided to the patient for reference.

## 2023-01-11 NOTE — PERIOP NOTE
HgbA1C 7.2 and pt is not a known diabetic. Staff message sent to Sadie Rothman NP informing of elevated HgbA1C. All other lab results within anesthesia guidelines.

## 2023-01-12 NOTE — PROGRESS NOTES
Dr. Fernandez Miss reviewed chart. New order received \"recheck BP DOS. \" Keaton Hernandez to proceed.

## 2023-01-13 ENCOUNTER — OFFICE VISIT (OUTPATIENT)
Dept: ORTHOPEDIC SURGERY | Age: 68
End: 2023-01-13

## 2023-01-13 DIAGNOSIS — M17.11 PRIMARY OSTEOARTHRITIS OF RIGHT KNEE: Primary | ICD-10-CM

## 2023-01-13 NOTE — PROGRESS NOTES
ATTENTION: ABNORMAL Hgb A1C 7.1    Dear Dr. Modesto Jimenes MD  [phone],          Our mutual patient, Nancy Pop, is scheduled for surgery on 1/17/2023 She is having a Right TKA, by Dr. Lorena Britt. During a recent visit to surgical pre-admissions, the above mentioned patient was found to have a HgbA1c of 7.1  The [address] [place] [address] Surgery Department is now obtaining HgBA1c levels on all diabetic patient undergoing orthopedic joint surgery as a means to improve risk stratification of our patients and prevent postoperative complications. Post-surgical adverse outcomes related to Diabetes Mellitus are believed to be related to the pre-existing complications of chronic hyperglycemia. It is critical that all surgical patients be evaluated preoperatively for undiagnosed and/or uncontrolled diabetes by having a Hgb A1c level tested. Some facts that support this decision are:  Science shows that mortality rates double in surgical patients with high glucose levels. Thirty percent of patients that have high glucose are not diagnosed as diabetic. Maintaining glucose control in surgical patients saves lives and reduces complications. The relative risk for serious postoperative infections (sepsis, pneumonia, and wound infection) increased 5.7 fold when any postoperative day one blood glucose was > 220 mg/dL. We will take the following steps to assist in tighter glucose control jairo-operatively:  Notify the patients surgeon. It will be the surgeons decision whether or not to cancel surgery. Consult the hospitalist for perioperative glucose management. We would also appreciate your assistance and recommendations in maintaining tight glucose control in your patient during the jairo-operative setting. We believe that all of us working together can help prevent post-operative complications and improve our patients surgical results.      Sincerely,   Alethea Johnson, APRN, MSN

## 2023-01-13 NOTE — H&P
H&P    Patient ID:  Cody Benitez  [id number]  25 y.o.  1955  Surgeon:  Ana Siddiqi MD  Date of Surgery: * No surgery date entered *  Procedure: Robot assisted right Total Knee Arthroplasty  Primary Care Physician: Naz Vila MD        Subjective:  Cody Benitez is a 69 y.o. White (non-) female who presents with right knee pain. They have a history of right knee pain for several months. Symptoms worse with walking long distances and relieved with rest. Conservative treatment consisting of  activity modification and injections have not helped. The patient lives with their family. The patients goal after surgery is improved pain and function.         Past Medical History:   Diagnosis Date    Allergic rhinitis     Anxiety     Arthritis     Autoimmune hepatitis ([facility] [address]     Pt states diagnosis not accurate- was thought to be hepatitis but was a bile duct that had been cut during surgery    Chronic pain syndrome     Depression     Ear problems     Elevated liver enzymes     resolved per pt    Facet arthritis of lumbar region     GERD (gastroesophageal reflux disease)     Hearing reduced     Hyperlipemia     managed with medication    Hypertension     managed with medication    Iron deficiency anemia     hx    Menopausal disorder     Metabolic syndrome     Osteoporosis     Restrictive lung disease     PRN inhaler-last use 9/2022    Thickened endometrium       Past Surgical History:   Procedure Laterality Date    CHOLECYSTECTOMY      ERCP  05/2021    ORTHOPEDIC SURGERY Left 03/13/2014    hip fracture and sugery- fell at work    [address]  2001    sphincterotomy- Dr. Lindquist Like Left 9/6/2022    ROBOTIC ASSISTED LEFT KNEE TOTAL ARTHROPLASTY performed by Ana Siddiqi MD at [facility]     Family History   Problem Relation Age of Onset    Cancer Brother         lung    Cancer Mother         breast    Breast Cancer Mother 15      Social History     Tobacco Use    Smoking status: Never    Smokeless tobacco: Never   Substance Use Topics    Alcohol use: No       Prior to Admission medications    Medication Sig Start Date End Date Taking? Authorizing Provider   meloxicam (MOBIC) 7.5 MG tablet Take 7.5 mg by mouth daily    Historical Provider, MD   docusate sodium (COLACE) 100 MG capsule Take 100 mg by mouth 2 times daily. Historical Provider, MD   Ferrous Sulfate Dried (FERROUS SULFATE IRON PO) Take 1 tablet by mouth daily. 65 mg    Historical Provider, MD   Calcium Citrate-Vitamin D (CALCIUM CITRATE + D3 PO) Take 1 tablet by mouth daily. Historical Provider, MD   Ascorbic Acid (VITAMIN C) 500 MG CAPS Take 1 tablet by mouth daily. Historical Provider, MD   aspirin 81 MG EC tablet Take 1 tablet by mouth 2 times daily 9/7/22 10/7/22  JENNIFER Granda   acetaminophen (TYLENOL) 500 MG tablet Take 1,000 mg by mouth every 6 hours as needed (breakthrough pain)    Historical Provider, MD   LORazepam (ATIVAN) 1 MG tablet Take 1 mg by mouth 2 times daily as needed for Anxiety. Historical Provider, MD   linaclotide (LINZESS) 145 MCG capsule Take 145 mcg by mouth every morning (before breakfast)    Historical Provider, MD   albuterol sulfate  (90 Base) MCG/ACT inhaler Inhale 1 puff into the lungs every 4 hours as needed for Wheezing 2/18/21   Ar Automatic Reconciliation   amLODIPine (NORVASC) 10 MG tablet Take 10 mg by mouth daily 1/8/18   Ar Automatic Reconciliation   carvedilol (COREG) 6.25 MG tablet Take 6.25 mg by mouth 2 times daily (with meals) 4/27/17   Ar Automatic Reconciliation   DULoxetine (CYMBALTA) 60 MG extended release capsule Take 60 mg by mouth 2 times daily 5/30/17   Ar Automatic Reconciliation   fluticasone (FLONASE) 50 MCG/ACT nasal spray 1 to 2 sprays each nostril daily    Ar Automatic Reconciliation   gabapentin (NEURONTIN) 100 MG capsule Take 400 mg by mouth at bedtime.  5/22/17   Ar Automatic Reconciliation hydroCHLOROthiazide (HYDRODIURIL) 25 MG tablet Take 25 mg by mouth daily 3/3/17   Ar Automatic Reconciliation   potassium chloride (KLOR-CON) 10 MEQ extended release tablet Take 10 mEq by mouth 2 times daily 4/27/17   Ar Automatic Reconciliation   QUEtiapine (SEROQUEL) 25 MG tablet Take 25 mg by mouth at bedtime 5/30/17   Ar Automatic Reconciliation   rosuvastatin (CRESTOR) 20 MG tablet Take 20 mg by mouth at bedtime 9/18/17   Ar Automatic Reconciliation   Diclofenac Sodium 2 % SOLN Apply 4 Act topically 2 times daily  Patient not taking: No sig reported 5/30/17 9/7/22  Ar Automatic Reconciliation   fenofibrate micronized (LOFIBRA) 200 MG capsule Take 200 mg by mouth 4/13/17 9/7/22  Ar Automatic Reconciliation   Magnesium Oxide 500 MG TABS Take by mouth  Patient not taking: Reported on 9/6/2022 9/7/22  Ar Automatic Reconciliation     Allergies   Allergen Reactions    Benazepril Other (See Comments)    Erythromycin Other (See Comments)     Other reaction(s): Abdominal pain-I  Other reaction(s): Abdominal pain-I  GI upset        REVIEW OF SYSTEMS:  CONSTITUTIONAL: Denies fever, decreased appetite, weight loss/gain, night sweats or fatigue. HEENT: Denies vision or hearing changes. denies glasses. denies hearing aids. CARDIAC: Denies CP, palpitations, rheumatic fever, murmur, peripheral edema, carotid artery disease or syncopal episodes. RESPIRATORY: Denies dyspnea on exertion, asthma, COPD or orthopnea. GI: Denies GERD, history of GI bleed or melena, PUD, hepatitis or cirrhosis. : Denies dysuria, hematuria. denies BPH symptoms. HEMATOLOGIC: Denies anemia or blood disorders. ENDOCRINE: Denies thyroid disease. MUSCULOSKELETAL: See HPI. NEUROLOGIC: Denies seizure, peripheral neuropathy or memory loss. PSYCH: Denies depression, anxiety or insomnia. SKIN: Denies rash or open sores. Objective:    PHYSICAL EXAM  GENERAL: No data found. EYES: PERRL. EOM intact. MOUTH:Teeth and Gums normal. NECK: Full ROM.  Trachea midline. No thyromegaly or JVD. CARDIOVASCULAR: Regular rate and rhythm. No murmur or gallops. No carotid bruits. Peripheral pulses: radial 2 +, PT 2+, DP 2+ bilaterally. LUNGS: CTA bilaterally. No wheezes, rhonchi or rales. GI: positive BS. Abdomen nontender. NEUROLOGIC: Alert and oriented x 3. Bilateral equal strong had grasp and bilateral equal strong plantar flexion and dorsiflexion. GAIT: abnormal MUSCULOSKELETAL: ROM: full with pain. Tenderness: over the medial and lateral joint lines. Crepitus: present. SKIN: No rash, bruising, swelling, redness or warmth. Labs:  No results found for this or any previous visit (from the past 24 hour(s)). Xray Right knee: joint space narrowing with advanced degenerative changes. Assessment:  Advanced Right Knee Osteoarthritis. Robot assisted Total Right Knee Arthroplasty Indicated. Patient Active Problem List   Diagnosis    Hypertension    Recurrent major depressive disorder, in partial remission (HCC)    Arthralgia of hip    Osteoporosis    Hyperlipemia    Unilateral primary osteoarthritis, left knee    Suspected sleep apnea    Arthritis of left knee    Osteoarthritis of right knee       Plan:  I have advised the patient of the risks and consequences, including possible complications of performing total joint replacement, as well as not doing this operation. The patient had the opportunity to ask questions and have them answered to their satisfaction.      Signed:  JENNIFER Gutierrez 1/13/2023

## 2023-01-13 NOTE — PROGRESS NOTES
Name: Amador Carranza  YOB: 1955  Gender: female  MRN: [id number]    Pre-Op     CC: RIGHT KNEE PAIN       This patient comes in for pre-op exam prior to RIGHT TKA. The patient has been cleared preoperatively. I counseled the patient once again about the risks of infection, DVT formation, expected time of hospitalization, anticipated recovery time as well as rehab needs and expectations for recovery. The patient would like to proceed and we will do so as planned. The patient was provided with pain medications as well as DVT prophylaxis to have on hand postoperatively at the time of discharge from hospital. All pertinent questions asked by the patient were answered.     JENNIFER Collins

## 2023-01-13 NOTE — PROGRESS NOTES
Total Joint Surgery Preoperative Chart Review      Patient ID:  Jacqueline Irene  [id number]  77 y.o.  1955  Surgeon: Dr. Sushant Cooper  Date of Surgery: 1/17/2023  Procedure: Total Right Knee Arthroplasty  Primary Care Physician: Nacho Bailey [phone]  Specialty Physician(s):      Subjective:   Jacqueline Irene is a 69 y.o. White (non-) female who presents for preoperative evaluation for Total Right Knee arthroplasty. This is a preoperative chart review note based on data collected by the nurse at the surgical Pre-Assessment visit.     Past Medical History:   Diagnosis Date    Allergic rhinitis     Anxiety     Arthritis     Autoimmune hepatitis ([facility] [address]     Pt states diagnosis not accurate- was thought to be hepatitis but was a bile duct that had been cut during surgery    Chronic pain syndrome     Depression     Ear problems     Elevated liver enzymes     resolved per pt    Facet arthritis of lumbar region     GERD (gastroesophageal reflux disease)     Hearing reduced     Hyperlipemia     managed with medication    Hypertension     managed with medication    Iron deficiency anemia     hx    Menopausal disorder     Metabolic syndrome     Osteoporosis     Restrictive lung disease     PRN inhaler-last use 9/2022    Thickened endometrium       Past Surgical History:   Procedure Laterality Date    CHOLECYSTECTOMY      ERCP  05/2021    ORTHOPEDIC SURGERY Left 03/13/2014    hip fracture and sugery- fell at work    [address]  2001    sphincterotomy- Dr. Zeus Carl Left 9/6/2022    ROBOTIC ASSISTED LEFT KNEE TOTAL ARTHROPLASTY performed by Sidney Montes MD at [facility]     Family History   Problem Relation Age of Onset    Cancer Brother         lung    Cancer Mother         breast    Breast Cancer Mother 15      Social History     Tobacco Use    Smoking status: Never    Smokeless tobacco: Never   Substance Use Topics    Alcohol use: No       Prior to Admission medications    Medication Sig Start Date End Date Taking? Authorizing Provider   meloxicam (MOBIC) 7.5 MG tablet Take 7.5 mg by mouth daily   Yes Historical Provider, MD   docusate sodium (COLACE) 100 MG capsule Take 100 mg by mouth 2 times daily. Historical Provider, MD   Ferrous Sulfate Dried (FERROUS SULFATE IRON PO) Take 1 tablet by mouth daily. 65 mg    Historical Provider, MD   Calcium Citrate-Vitamin D (CALCIUM CITRATE + D3 PO) Take 1 tablet by mouth daily. Historical Provider, MD   Ascorbic Acid (VITAMIN C) 500 MG CAPS Take 1 tablet by mouth daily. Historical Provider, MD   aspirin 81 MG EC tablet Take 1 tablet by mouth 2 times daily 9/7/22 10/7/22  JENNIFER Sinclair   acetaminophen (TYLENOL) 500 MG tablet Take 1,000 mg by mouth every 6 hours as needed (breakthrough pain)    Historical Provider, MD   LORazepam (ATIVAN) 1 MG tablet Take 1 mg by mouth 2 times daily as needed for Anxiety. Historical Provider, MD   linaclotide (LINZESS) 145 MCG capsule Take 145 mcg by mouth every morning (before breakfast)    Historical Provider, MD   albuterol sulfate  (90 Base) MCG/ACT inhaler Inhale 1 puff into the lungs every 4 hours as needed for Wheezing 2/18/21   Ar Automatic Reconciliation   amLODIPine (NORVASC) 10 MG tablet Take 10 mg by mouth daily 1/8/18   Ar Automatic Reconciliation   carvedilol (COREG) 6.25 MG tablet Take 6.25 mg by mouth 2 times daily (with meals) 4/27/17   Ar Automatic Reconciliation   DULoxetine (CYMBALTA) 60 MG extended release capsule Take 60 mg by mouth 2 times daily 5/30/17   Ar Automatic Reconciliation   fluticasone (FLONASE) 50 MCG/ACT nasal spray 1 to 2 sprays each nostril daily    Ar Automatic Reconciliation   gabapentin (NEURONTIN) 100 MG capsule Take 400 mg by mouth at bedtime.  5/22/17   Ar Automatic Reconciliation   hydroCHLOROthiazide (HYDRODIURIL) 25 MG tablet Take 25 mg by mouth daily 3/3/17   Ar Automatic Reconciliation   potassium chloride (KLOR-CON) 10 MEQ extended release tablet Take 10 mEq by mouth 2 times daily 4/27/17   Ar Automatic Reconciliation   QUEtiapine (SEROQUEL) 25 MG tablet Take 25 mg by mouth at bedtime 5/30/17   Ar Automatic Reconciliation   rosuvastatin (CRESTOR) 20 MG tablet Take 20 mg by mouth at bedtime 9/18/17   Ar Automatic Reconciliation   Diclofenac Sodium 2 % SOLN Apply 4 Act topically 2 times daily  Patient not taking: No sig reported 5/30/17 9/7/22  Ar Automatic Reconciliation   fenofibrate micronized (LOFIBRA) 200 MG capsule Take 200 mg by mouth 4/13/17 9/7/22  Ar Automatic Reconciliation   Magnesium Oxide 500 MG TABS Take by mouth  Patient not taking: Reported on 9/6/2022 9/7/22  Ar Automatic Reconciliation     Allergies   Allergen Reactions    Benazepril Other (See Comments)    Erythromycin Other (See Comments)     Other reaction(s): Abdominal pain-I  Other reaction(s): Abdominal pain-I  GI upset          Objective:     Physical Exam:   No data found. ECG:    No results found for this or any previous visit (from the past 4464 hour(s)). Data Review:   Labs:   Labs reviewed    Problem List:  )  Patient Active Problem List   Diagnosis    Hypertension    Recurrent major depressive disorder, in partial remission (HCC)    Arthralgia of hip    Osteoporosis    Hyperlipemia    Unilateral primary osteoarthritis, left knee    Suspected sleep apnea    Arthritis of left knee    Osteoarthritis of right knee       Total Joint Surgery Pre-Assessment Recommendations:           Continuous saturation monitoring during hospitalization. O2 prn per respiratory protocol. Albuterol every 6 hours as need during hospitalization.      Signed By: FLY Arias - CNP-C    January 13, 2023

## 2023-01-16 ENCOUNTER — ANESTHESIA EVENT (OUTPATIENT)
Dept: SURGERY | Age: 68
End: 2023-01-16
Payer: MEDICARE

## 2023-01-17 ENCOUNTER — HOSPITAL ENCOUNTER (OUTPATIENT)
Age: 68
Discharge: HOME HEALTH | End: 2023-01-17
Attending: ORTHOPAEDIC SURGERY | Admitting: ORTHOPAEDIC SURGERY
Payer: MEDICARE

## 2023-01-17 ENCOUNTER — HOME HEALTH ADMISSION (OUTPATIENT)
Dept: HOME HEALTH SERVICES | Facility: HOME HEALTH | Age: 68
End: 2023-01-17
Payer: MEDICARE

## 2023-01-17 ENCOUNTER — ANESTHESIA (OUTPATIENT)
Dept: SURGERY | Age: 68
End: 2023-01-17
Payer: MEDICARE

## 2023-01-17 VITALS
OXYGEN SATURATION: 90 % | HEIGHT: 62 IN | BODY MASS INDEX: 40.3 KG/M2 | RESPIRATION RATE: 18 BRPM | DIASTOLIC BLOOD PRESSURE: 66 MMHG | WEIGHT: 219 LBS | TEMPERATURE: 99.1 F | SYSTOLIC BLOOD PRESSURE: 148 MMHG | HEART RATE: 92 BPM

## 2023-01-17 DIAGNOSIS — Z96.651 TOTAL KNEE REPLACEMENT STATUS, RIGHT: Primary | ICD-10-CM

## 2023-01-17 PROBLEM — M17.11 ARTHRITIS OF RIGHT KNEE: Status: ACTIVE | Noted: 2023-01-17

## 2023-01-17 PROCEDURE — 64447 NJX AA&/STRD FEMORAL NRV IMG: CPT | Performed by: ANESTHESIOLOGY

## 2023-01-17 PROCEDURE — 3700000000 HC ANESTHESIA ATTENDED CARE: Performed by: ORTHOPAEDIC SURGERY

## 2023-01-17 PROCEDURE — 2500000003 HC RX 250 WO HCPCS: Performed by: ORTHOPAEDIC SURGERY

## 2023-01-17 PROCEDURE — 6370000000 HC RX 637 (ALT 250 FOR IP): Performed by: ANESTHESIOLOGY

## 2023-01-17 PROCEDURE — 7100000000 HC PACU RECOVERY - FIRST 15 MIN: Performed by: ORTHOPAEDIC SURGERY

## 2023-01-17 PROCEDURE — 2709999900 HC NON-CHARGEABLE SUPPLY: Performed by: ORTHOPAEDIC SURGERY

## 2023-01-17 PROCEDURE — C1776 JOINT DEVICE (IMPLANTABLE): HCPCS | Performed by: ORTHOPAEDIC SURGERY

## 2023-01-17 PROCEDURE — 6360000002 HC RX W HCPCS: Performed by: ORTHOPAEDIC SURGERY

## 2023-01-17 PROCEDURE — 97535 SELF CARE MNGMENT TRAINING: CPT

## 2023-01-17 PROCEDURE — 6360000002 HC RX W HCPCS: Performed by: PHYSICIAN ASSISTANT

## 2023-01-17 PROCEDURE — 97161 PT EVAL LOW COMPLEX 20 MIN: CPT

## 2023-01-17 PROCEDURE — C1713 ANCHOR/SCREW BN/BN,TIS/BN: HCPCS | Performed by: ORTHOPAEDIC SURGERY

## 2023-01-17 PROCEDURE — 6370000000 HC RX 637 (ALT 250 FOR IP): Performed by: PHYSICIAN ASSISTANT

## 2023-01-17 PROCEDURE — 3700000001 HC ADD 15 MINUTES (ANESTHESIA): Performed by: ORTHOPAEDIC SURGERY

## 2023-01-17 PROCEDURE — 6360000002 HC RX W HCPCS: Performed by: NURSE ANESTHETIST, CERTIFIED REGISTERED

## 2023-01-17 PROCEDURE — 3600000005 HC SURGERY LEVEL 5 BASE: Performed by: ORTHOPAEDIC SURGERY

## 2023-01-17 PROCEDURE — 97530 THERAPEUTIC ACTIVITIES: CPT

## 2023-01-17 PROCEDURE — 6360000002 HC RX W HCPCS: Performed by: ANESTHESIOLOGY

## 2023-01-17 PROCEDURE — 2580000003 HC RX 258: Performed by: ANESTHESIOLOGY

## 2023-01-17 PROCEDURE — 2580000003 HC RX 258: Performed by: ORTHOPAEDIC SURGERY

## 2023-01-17 PROCEDURE — 97165 OT EVAL LOW COMPLEX 30 MIN: CPT

## 2023-01-17 PROCEDURE — 7100000001 HC PACU RECOVERY - ADDTL 15 MIN: Performed by: ORTHOPAEDIC SURGERY

## 2023-01-17 PROCEDURE — 2720000010 HC SURG SUPPLY STERILE: Performed by: ORTHOPAEDIC SURGERY

## 2023-01-17 PROCEDURE — 3600000015 HC SURGERY LEVEL 5 ADDTL 15MIN: Performed by: ORTHOPAEDIC SURGERY

## 2023-01-17 PROCEDURE — 6370000000 HC RX 637 (ALT 250 FOR IP): Performed by: ORTHOPAEDIC SURGERY

## 2023-01-17 PROCEDURE — 2500000003 HC RX 250 WO HCPCS: Performed by: NURSE ANESTHETIST, CERTIFIED REGISTERED

## 2023-01-17 DEVICE — CEMENT BONE 40GM HI VISC PALACOS R: Type: IMPLANTABLE DEVICE | Site: KNEE | Status: FUNCTIONAL

## 2023-01-17 DEVICE — IMPLANTABLE DEVICE: Type: IMPLANTABLE DEVICE | Site: KNEE | Status: FUNCTIONAL

## 2023-01-17 DEVICE — BASEPLATE TIB SZ 5 FIX BEAR CO CHROM MOLYBDENUM TI ALLY END: Type: IMPLANTABLE DEVICE | Site: KNEE | Status: FUNCTIONAL

## 2023-01-17 DEVICE — STEM FEM L50MM DIA14MM KNEE CEM REV ATTUNE: Type: IMPLANTABLE DEVICE | Site: KNEE | Status: FUNCTIONAL

## 2023-01-17 DEVICE — KNEE K1 TOT HEMI STD CEM IMPL CAPPED SYNTHES: Type: IMPLANTABLE DEVICE | Status: FUNCTIONAL

## 2023-01-17 DEVICE — COMPONENT FEM SZ 5 R KNEE POST STBL CEM ATTUNE: Type: IMPLANTABLE DEVICE | Site: KNEE | Status: FUNCTIONAL

## 2023-01-17 RX ORDER — ACETAMINOPHEN 500 MG
1000 TABLET ORAL ONCE
Status: DISCONTINUED | OUTPATIENT
Start: 2023-01-17 | End: 2023-01-17 | Stop reason: HOSPADM

## 2023-01-17 RX ORDER — CELECOXIB 200 MG/1
200 CAPSULE ORAL DAILY
Status: DISCONTINUED | OUTPATIENT
Start: 2023-01-17 | End: 2023-01-17 | Stop reason: HOSPADM

## 2023-01-17 RX ORDER — ROSUVASTATIN CALCIUM 20 MG/1
20 TABLET, COATED ORAL NIGHTLY
Status: DISCONTINUED | OUTPATIENT
Start: 2023-01-17 | End: 2023-01-17 | Stop reason: HOSPADM

## 2023-01-17 RX ORDER — ASPIRIN 81 MG/1
81 TABLET ORAL 2 TIMES DAILY
Status: DISCONTINUED | OUTPATIENT
Start: 2023-01-17 | End: 2023-01-17 | Stop reason: HOSPADM

## 2023-01-17 RX ORDER — ASPIRIN 81 MG/1
81 TABLET ORAL 2 TIMES DAILY
Qty: 60 TABLET | Refills: 0 | Status: SHIPPED | OUTPATIENT
Start: 2023-01-17 | End: 2023-02-16

## 2023-01-17 RX ORDER — ROPIVACAINE HYDROCHLORIDE 2 MG/ML
INJECTION, SOLUTION EPIDURAL; INFILTRATION; PERINEURAL PRN
Status: DISCONTINUED | OUTPATIENT
Start: 2023-01-17 | End: 2023-01-17 | Stop reason: HOSPADM

## 2023-01-17 RX ORDER — HYDROMORPHONE HYDROCHLORIDE 1 MG/ML
0.5 INJECTION, SOLUTION INTRAMUSCULAR; INTRAVENOUS; SUBCUTANEOUS
Status: DISCONTINUED | OUTPATIENT
Start: 2023-01-17 | End: 2023-01-17 | Stop reason: HOSPADM

## 2023-01-17 RX ORDER — KETOROLAC TROMETHAMINE 30 MG/ML
INJECTION, SOLUTION INTRAMUSCULAR; INTRAVENOUS PRN
Status: DISCONTINUED | OUTPATIENT
Start: 2023-01-17 | End: 2023-01-17 | Stop reason: HOSPADM

## 2023-01-17 RX ORDER — SODIUM CHLORIDE 9 MG/ML
INJECTION, SOLUTION INTRAVENOUS PRN
Status: DISCONTINUED | OUTPATIENT
Start: 2023-01-17 | End: 2023-01-17 | Stop reason: HOSPADM

## 2023-01-17 RX ORDER — EPHEDRINE SULFATE/0.9% NACL/PF 50 MG/5 ML
SYRINGE (ML) INTRAVENOUS PRN
Status: DISCONTINUED | OUTPATIENT
Start: 2023-01-17 | End: 2023-01-17 | Stop reason: SDUPTHER

## 2023-01-17 RX ORDER — DIPHENHYDRAMINE HCL 25 MG
25 CAPSULE ORAL EVERY 6 HOURS PRN
Status: DISCONTINUED | OUTPATIENT
Start: 2023-01-17 | End: 2023-01-17 | Stop reason: HOSPADM

## 2023-01-17 RX ORDER — PROMETHAZINE HYDROCHLORIDE 25 MG/1
25 TABLET ORAL EVERY 6 HOURS PRN
Status: DISCONTINUED | OUTPATIENT
Start: 2023-01-17 | End: 2023-01-17 | Stop reason: HOSPADM

## 2023-01-17 RX ORDER — ROPIVACAINE HYDROCHLORIDE 2 MG/ML
INJECTION, SOLUTION EPIDURAL; INFILTRATION; PERINEURAL
Status: COMPLETED | OUTPATIENT
Start: 2023-01-17 | End: 2023-01-17

## 2023-01-17 RX ORDER — OXYCODONE HYDROCHLORIDE 5 MG/1
5 TABLET ORAL
Status: COMPLETED | OUTPATIENT
Start: 2023-01-17 | End: 2023-01-17

## 2023-01-17 RX ORDER — HYDROMORPHONE HYDROCHLORIDE 1 MG/ML
1 INJECTION, SOLUTION INTRAMUSCULAR; INTRAVENOUS; SUBCUTANEOUS
Status: DISCONTINUED | OUTPATIENT
Start: 2023-01-17 | End: 2023-01-17 | Stop reason: HOSPADM

## 2023-01-17 RX ORDER — QUETIAPINE FUMARATE 25 MG/1
25 TABLET, FILM COATED ORAL NIGHTLY
Status: DISCONTINUED | OUTPATIENT
Start: 2023-01-17 | End: 2023-01-17 | Stop reason: HOSPADM

## 2023-01-17 RX ORDER — ONDANSETRON 2 MG/ML
INJECTION INTRAMUSCULAR; INTRAVENOUS PRN
Status: DISCONTINUED | OUTPATIENT
Start: 2023-01-17 | End: 2023-01-17 | Stop reason: SDUPTHER

## 2023-01-17 RX ORDER — DEXAMETHASONE SODIUM PHOSPHATE 10 MG/ML
INJECTION INTRAMUSCULAR; INTRAVENOUS PRN
Status: DISCONTINUED | OUTPATIENT
Start: 2023-01-17 | End: 2023-01-17 | Stop reason: SDUPTHER

## 2023-01-17 RX ORDER — FENTANYL CITRATE 50 UG/ML
100 INJECTION, SOLUTION INTRAMUSCULAR; INTRAVENOUS
Status: COMPLETED | OUTPATIENT
Start: 2023-01-17 | End: 2023-01-17

## 2023-01-17 RX ORDER — ACETAMINOPHEN 325 MG/1
650 TABLET ORAL EVERY 6 HOURS
Status: DISCONTINUED | OUTPATIENT
Start: 2023-01-17 | End: 2023-01-17 | Stop reason: HOSPADM

## 2023-01-17 RX ORDER — OXYCODONE HYDROCHLORIDE 5 MG/1
5 TABLET ORAL EVERY 4 HOURS PRN
Status: DISCONTINUED | OUTPATIENT
Start: 2023-01-17 | End: 2023-01-17 | Stop reason: HOSPADM

## 2023-01-17 RX ORDER — FLUTICASONE PROPIONATE 50 MCG
2 SPRAY, SUSPENSION (ML) NASAL DAILY
Status: DISCONTINUED | OUTPATIENT
Start: 2023-01-18 | End: 2023-01-17 | Stop reason: HOSPADM

## 2023-01-17 RX ORDER — MIDAZOLAM HYDROCHLORIDE 1 MG/ML
INJECTION INTRAMUSCULAR; INTRAVENOUS PRN
Status: DISCONTINUED | OUTPATIENT
Start: 2023-01-17 | End: 2023-01-17 | Stop reason: SDUPTHER

## 2023-01-17 RX ORDER — OXYCODONE HYDROCHLORIDE 5 MG/1
10 TABLET ORAL EVERY 4 HOURS PRN
Status: DISCONTINUED | OUTPATIENT
Start: 2023-01-17 | End: 2023-01-17 | Stop reason: HOSPADM

## 2023-01-17 RX ORDER — NALOXONE HYDROCHLORIDE 0.4 MG/ML
0.4 INJECTION, SOLUTION INTRAMUSCULAR; INTRAVENOUS; SUBCUTANEOUS PRN
Status: DISCONTINUED | OUTPATIENT
Start: 2023-01-17 | End: 2023-01-17 | Stop reason: HOSPADM

## 2023-01-17 RX ORDER — SODIUM CHLORIDE 0.9 % (FLUSH) 0.9 %
5-40 SYRINGE (ML) INJECTION PRN
Status: DISCONTINUED | OUTPATIENT
Start: 2023-01-17 | End: 2023-01-17 | Stop reason: HOSPADM

## 2023-01-17 RX ORDER — SODIUM CHLORIDE 0.9 % (FLUSH) 0.9 %
5-40 SYRINGE (ML) INJECTION EVERY 12 HOURS SCHEDULED
Status: DISCONTINUED | OUTPATIENT
Start: 2023-01-17 | End: 2023-01-17 | Stop reason: HOSPADM

## 2023-01-17 RX ORDER — MIDAZOLAM HYDROCHLORIDE 2 MG/2ML
2 INJECTION, SOLUTION INTRAMUSCULAR; INTRAVENOUS
Status: COMPLETED | OUTPATIENT
Start: 2023-01-17 | End: 2023-01-17

## 2023-01-17 RX ORDER — ALBUTEROL SULFATE 2.5 MG/3ML
2.5 SOLUTION RESPIRATORY (INHALATION) EVERY 6 HOURS PRN
Status: DISCONTINUED | OUTPATIENT
Start: 2023-01-17 | End: 2023-01-17 | Stop reason: HOSPADM

## 2023-01-17 RX ORDER — TRANEXAMIC ACID 100 MG/ML
INJECTION, SOLUTION INTRAVENOUS PRN
Status: DISCONTINUED | OUTPATIENT
Start: 2023-01-17 | End: 2023-01-17 | Stop reason: SDUPTHER

## 2023-01-17 RX ORDER — DEXAMETHASONE SODIUM PHOSPHATE 10 MG/ML
10 INJECTION INTRAMUSCULAR; INTRAVENOUS EVERY 6 HOURS
Status: DISCONTINUED | OUTPATIENT
Start: 2023-01-18 | End: 2023-01-17 | Stop reason: HOSPADM

## 2023-01-17 RX ORDER — ACETAMINOPHEN 500 MG
1000 TABLET ORAL ONCE
Status: DISCONTINUED | OUTPATIENT
Start: 2023-01-17 | End: 2023-01-17 | Stop reason: CLARIF

## 2023-01-17 RX ORDER — METHOCARBAMOL 750 MG/1
750 TABLET, FILM COATED ORAL 4 TIMES DAILY PRN
Qty: 40 TABLET | Refills: 0 | Status: SHIPPED | OUTPATIENT
Start: 2023-01-17

## 2023-01-17 RX ORDER — PROMETHAZINE HYDROCHLORIDE 25 MG/1
25 TABLET ORAL EVERY 6 HOURS PRN
Qty: 40 TABLET | Refills: 0 | Status: SHIPPED | OUTPATIENT
Start: 2023-01-17

## 2023-01-17 RX ORDER — POTASSIUM CHLORIDE 750 MG/1
10 TABLET, EXTENDED RELEASE ORAL 2 TIMES DAILY
Status: DISCONTINUED | OUTPATIENT
Start: 2023-01-17 | End: 2023-01-17 | Stop reason: HOSPADM

## 2023-01-17 RX ORDER — DIPHENHYDRAMINE HYDROCHLORIDE 50 MG/ML
12.5 INJECTION INTRAMUSCULAR; INTRAVENOUS
Status: DISCONTINUED | OUTPATIENT
Start: 2023-01-17 | End: 2023-01-17 | Stop reason: HOSPADM

## 2023-01-17 RX ORDER — SENNA AND DOCUSATE SODIUM 50; 8.6 MG/1; MG/1
1 TABLET, FILM COATED ORAL 2 TIMES DAILY
Status: DISCONTINUED | OUTPATIENT
Start: 2023-01-17 | End: 2023-01-17 | Stop reason: HOSPADM

## 2023-01-17 RX ORDER — SODIUM CHLORIDE, SODIUM LACTATE, POTASSIUM CHLORIDE, CALCIUM CHLORIDE 600; 310; 30; 20 MG/100ML; MG/100ML; MG/100ML; MG/100ML
INJECTION, SOLUTION INTRAVENOUS CONTINUOUS
Status: DISCONTINUED | OUTPATIENT
Start: 2023-01-17 | End: 2023-01-17 | Stop reason: HOSPADM

## 2023-01-17 RX ORDER — CELECOXIB 200 MG/1
200 CAPSULE ORAL DAILY
Qty: 30 CAPSULE | Refills: 0 | Status: SHIPPED | OUTPATIENT
Start: 2023-01-17 | End: 2023-02-16

## 2023-01-17 RX ORDER — AMLODIPINE BESYLATE 10 MG/1
10 TABLET ORAL DAILY
Status: DISCONTINUED | OUTPATIENT
Start: 2023-01-18 | End: 2023-01-17 | Stop reason: HOSPADM

## 2023-01-17 RX ORDER — HYDROMORPHONE HYDROCHLORIDE 1 MG/ML
0.5 INJECTION, SOLUTION INTRAMUSCULAR; INTRAVENOUS; SUBCUTANEOUS EVERY 5 MIN PRN
Status: DISCONTINUED | OUTPATIENT
Start: 2023-01-17 | End: 2023-01-17 | Stop reason: HOSPADM

## 2023-01-17 RX ORDER — CARVEDILOL 6.25 MG/1
6.25 TABLET ORAL 2 TIMES DAILY WITH MEALS
Status: DISCONTINUED | OUTPATIENT
Start: 2023-01-17 | End: 2023-01-17 | Stop reason: HOSPADM

## 2023-01-17 RX ORDER — OXYCODONE HYDROCHLORIDE 5 MG/1
5-10 TABLET ORAL EVERY 4 HOURS PRN
Qty: 60 TABLET | Refills: 0 | Status: SHIPPED | OUTPATIENT
Start: 2023-01-17 | End: 2023-01-24

## 2023-01-17 RX ORDER — MAGNESIUM HYDROXIDE/ALUMINUM HYDROXICE/SIMETHICONE 120; 1200; 1200 MG/30ML; MG/30ML; MG/30ML
15 SUSPENSION ORAL EVERY 6 HOURS PRN
Status: DISCONTINUED | OUTPATIENT
Start: 2023-01-17 | End: 2023-01-17 | Stop reason: HOSPADM

## 2023-01-17 RX ORDER — DIPHENHYDRAMINE HYDROCHLORIDE 50 MG/ML
25 INJECTION INTRAMUSCULAR; INTRAVENOUS EVERY 6 HOURS PRN
Status: DISCONTINUED | OUTPATIENT
Start: 2023-01-17 | End: 2023-01-17 | Stop reason: HOSPADM

## 2023-01-17 RX ORDER — ONDANSETRON 2 MG/ML
4 INJECTION INTRAMUSCULAR; INTRAVENOUS EVERY 6 HOURS PRN
Status: DISCONTINUED | OUTPATIENT
Start: 2023-01-17 | End: 2023-01-17 | Stop reason: HOSPADM

## 2023-01-17 RX ORDER — HYDROCHLOROTHIAZIDE 25 MG/1
25 TABLET ORAL DAILY
Status: DISCONTINUED | OUTPATIENT
Start: 2023-01-18 | End: 2023-01-17 | Stop reason: HOSPADM

## 2023-01-17 RX ORDER — DOCUSATE SODIUM 100 MG/1
100 CAPSULE, LIQUID FILLED ORAL 2 TIMES DAILY
Status: DISCONTINUED | OUTPATIENT
Start: 2023-01-17 | End: 2023-01-17 | Stop reason: HOSPADM

## 2023-01-17 RX ORDER — ONDANSETRON 2 MG/ML
4 INJECTION INTRAMUSCULAR; INTRAVENOUS
Status: DISCONTINUED | OUTPATIENT
Start: 2023-01-17 | End: 2023-01-17 | Stop reason: HOSPADM

## 2023-01-17 RX ORDER — ALBUTEROL SULFATE 90 UG/1
1 AEROSOL, METERED RESPIRATORY (INHALATION) EVERY 4 HOURS PRN
Status: DISCONTINUED | OUTPATIENT
Start: 2023-01-17 | End: 2023-01-17 | Stop reason: SDUPTHER

## 2023-01-17 RX ORDER — GABAPENTIN 400 MG/1
400 CAPSULE ORAL NIGHTLY
Status: DISCONTINUED | OUTPATIENT
Start: 2023-01-17 | End: 2023-01-17 | Stop reason: HOSPADM

## 2023-01-17 RX ORDER — METHOCARBAMOL 750 MG/1
750 TABLET, FILM COATED ORAL 4 TIMES DAILY PRN
Status: DISCONTINUED | OUTPATIENT
Start: 2023-01-17 | End: 2023-01-17 | Stop reason: HOSPADM

## 2023-01-17 RX ORDER — SODIUM CHLORIDE 9 MG/ML
INJECTION, SOLUTION INTRAVENOUS CONTINUOUS
Status: DISCONTINUED | OUTPATIENT
Start: 2023-01-17 | End: 2023-01-17 | Stop reason: HOSPADM

## 2023-01-17 RX ORDER — LORAZEPAM 1 MG/1
1 TABLET ORAL 2 TIMES DAILY PRN
Status: DISCONTINUED | OUTPATIENT
Start: 2023-01-17 | End: 2023-01-17 | Stop reason: HOSPADM

## 2023-01-17 RX ORDER — DULOXETIN HYDROCHLORIDE 60 MG/1
60 CAPSULE, DELAYED RELEASE ORAL 2 TIMES DAILY
Status: DISCONTINUED | OUTPATIENT
Start: 2023-01-17 | End: 2023-01-17 | Stop reason: HOSPADM

## 2023-01-17 RX ORDER — PROPOFOL 10 MG/ML
INJECTION, EMULSION INTRAVENOUS CONTINUOUS PRN
Status: DISCONTINUED | OUTPATIENT
Start: 2023-01-17 | End: 2023-01-17 | Stop reason: SDUPTHER

## 2023-01-17 RX ADMIN — Medication 10 MG: at 10:51

## 2023-01-17 RX ADMIN — PHENYLEPHRINE HYDROCHLORIDE 100 MCG: 0.1 INJECTION, SOLUTION INTRAVENOUS at 11:00

## 2023-01-17 RX ADMIN — CELECOXIB 200 MG: 200 CAPSULE ORAL at 15:35

## 2023-01-17 RX ADMIN — Medication 10 MG: at 11:17

## 2023-01-17 RX ADMIN — PHENYLEPHRINE HYDROCHLORIDE 100 MCG: 0.1 INJECTION, SOLUTION INTRAVENOUS at 11:24

## 2023-01-17 RX ADMIN — PHENYLEPHRINE HYDROCHLORIDE 100 MCG: 0.1 INJECTION, SOLUTION INTRAVENOUS at 10:51

## 2023-01-17 RX ADMIN — Medication 10 MG: at 11:07

## 2023-01-17 RX ADMIN — OXYCODONE 5 MG: 5 TABLET ORAL at 12:16

## 2023-01-17 RX ADMIN — MIDAZOLAM 1 MG: 1 INJECTION INTRAMUSCULAR; INTRAVENOUS at 10:00

## 2023-01-17 RX ADMIN — PHENYLEPHRINE HYDROCHLORIDE 100 MCG: 0.1 INJECTION, SOLUTION INTRAVENOUS at 11:10

## 2023-01-17 RX ADMIN — ONDANSETRON 4 MG: 2 INJECTION INTRAMUSCULAR; INTRAVENOUS at 10:17

## 2023-01-17 RX ADMIN — MIDAZOLAM 1 MG: 1 INJECTION INTRAMUSCULAR; INTRAVENOUS at 09:12

## 2023-01-17 RX ADMIN — PHENYLEPHRINE HYDROCHLORIDE 100 MCG: 0.1 INJECTION, SOLUTION INTRAVENOUS at 11:17

## 2023-01-17 RX ADMIN — TRANEXAMIC ACID 1000 MG: 100 INJECTION, SOLUTION INTRAVENOUS at 10:16

## 2023-01-17 RX ADMIN — Medication 10 MG: at 10:37

## 2023-01-17 RX ADMIN — PHENYLEPHRINE HYDROCHLORIDE 50 MCG: 0.1 INJECTION, SOLUTION INTRAVENOUS at 10:19

## 2023-01-17 RX ADMIN — SODIUM CHLORIDE, SODIUM LACTATE, POTASSIUM CHLORIDE, AND CALCIUM CHLORIDE: 600; 310; 30; 20 INJECTION, SOLUTION INTRAVENOUS at 11:01

## 2023-01-17 RX ADMIN — FENTANYL CITRATE 50 MCG: 50 INJECTION, SOLUTION INTRAMUSCULAR; INTRAVENOUS at 09:12

## 2023-01-17 RX ADMIN — ROPIVACAINE HYDROCHLORIDE 20 ML: 2 INJECTION, SOLUTION EPIDURAL; INFILTRATION at 09:11

## 2023-01-17 RX ADMIN — MEPIVACAINE HYDROCHLORIDE 60 MG: 20 INJECTION, SOLUTION EPIDURAL; INFILTRATION at 10:04

## 2023-01-17 RX ADMIN — Medication 2000 MG: at 10:16

## 2023-01-17 RX ADMIN — MIDAZOLAM 1 MG: 1 INJECTION INTRAMUSCULAR; INTRAVENOUS at 10:06

## 2023-01-17 RX ADMIN — Medication 10 MG: at 10:16

## 2023-01-17 RX ADMIN — SODIUM CHLORIDE, SODIUM LACTATE, POTASSIUM CHLORIDE, AND CALCIUM CHLORIDE 100 ML/HR: 600; 310; 30; 20 INJECTION, SOLUTION INTRAVENOUS at 08:12

## 2023-01-17 RX ADMIN — PHENYLEPHRINE HYDROCHLORIDE 100 MCG: 0.1 INJECTION, SOLUTION INTRAVENOUS at 11:07

## 2023-01-17 RX ADMIN — Medication 10 MG: at 10:39

## 2023-01-17 RX ADMIN — OXYCODONE 10 MG: 5 TABLET ORAL at 14:33

## 2023-01-17 RX ADMIN — PROPOFOL 120 MCG/KG/MIN: 10 INJECTION, EMULSION INTRAVENOUS at 10:15

## 2023-01-17 RX ADMIN — PHENYLEPHRINE HYDROCHLORIDE 50 MCG: 0.1 INJECTION, SOLUTION INTRAVENOUS at 10:37

## 2023-01-17 RX ADMIN — DEXAMETHASONE SODIUM PHOSPHATE 10 MG: 10 INJECTION INTRAMUSCULAR; INTRAVENOUS at 10:17

## 2023-01-17 RX ADMIN — Medication 20 MG: at 10:56

## 2023-01-17 RX ADMIN — Medication 3 AMPULE: at 08:12

## 2023-01-17 ASSESSMENT — KOOS JR
KOOS JR TOTAL INTERVAL SCORE: 52.465
RISING FROM SITTING: 2
TWISING OR PIVOTING ON KNEE: 2
STRAIGHTENING KNEE FULLY: 2
GOING UP OR DOWN STAIRS: 2
STANDING UPRIGHT: 2
HOW SEVERE IS YOUR KNEE STIFFNESS AFTER FIRST WAKING IN MORNING: 2
BENDING TO THE FLOOR TO PICK UP OBJECT: 2

## 2023-01-17 ASSESSMENT — PAIN SCALES - GENERAL
PAINLEVEL_OUTOF10: 4
PAINLEVEL_OUTOF10: 4
PAINLEVEL_OUTOF10: 8

## 2023-01-17 ASSESSMENT — PAIN DESCRIPTION - LOCATION
LOCATION: KNEE
LOCATION: KNEE

## 2023-01-17 ASSESSMENT — PAIN - FUNCTIONAL ASSESSMENT
PAIN_FUNCTIONAL_ASSESSMENT: 0-10
PAIN_FUNCTIONAL_ASSESSMENT: 0-10

## 2023-01-17 ASSESSMENT — PAIN DESCRIPTION - DESCRIPTORS: DESCRIPTORS: ACHING

## 2023-01-17 ASSESSMENT — PAIN DESCRIPTION - ORIENTATION: ORIENTATION: RIGHT

## 2023-01-17 NOTE — ANESTHESIA PROCEDURE NOTES
Peripheral Block    Patient location during procedure: pre-op  Reason for block: post-op pain management and at surgeon's request  Start time: 1/17/2023 9:11 AM  End time: 1/17/2023 9:14 AM  Staffing  Performed: anesthesiologist   Anesthesiologist: Yesica Pedersen MD  Preanesthetic Checklist  Completed: patient identified, IV checked, site marked, risks and benefits discussed, surgical/procedural consents, equipment checked, pre-op evaluation, timeout performed, anesthesia consent given, oxygen available and monitors applied/VS acknowledged  Peripheral Block   Patient position: supine  Prep: ChloraPrep  Provider prep: mask and sterile gloves  Patient monitoring: cardiac monitor, continuous pulse ox, oxygen, IV access, frequent blood pressure checks and responsive to questions  Block type: Femoral  Adductor canal  Laterality: right  Injection technique: single-shot  Guidance: nerve stimulator and ultrasound guided    Needle   Needle type: insulated echogenic nerve stimulator needle   Needle gauge: 20 G  Needle localization: nerve stimulator and ultrasound guidance (minimal motor response at >0.4 mA)  Needle length: 10 cm  Assessment   Injection assessment: negative aspiration for heme, no paresthesia on injection, local visualized surrounding nerve on ultrasound and no intravascular symptoms  Slow fractionated injection: yes  Hemodynamics: stable  Real-time US image taken/store: yes  Outcomes: patient tolerated procedure well    Additional Notes  Risks/benefits/alternatives discussed including damage to nerve or muscle. Needle inserted and placed in close proximity to the nerve under real time ultrasound guidance. Ultrasound was used to visualize the spread of local anesthetic in increments of 2cc to 5cc in close proximity to the nerve being blocked. Patient tolerated procedure well with no immediate complications.   Image saved to chart    Decadron 4mg added to local anesthetic solution as adjunct  Medications Administered  ropivacaine (NAROPIN) injection 0.2% - Perineural   20 mL - 1/17/2023 9:11:00 AM

## 2023-01-17 NOTE — INTERVAL H&P NOTE
Update History & Physical    The patient's History and Physical of January 13, 23 was reviewed with the patient and I examined the patient. There was no change. The surgical site was confirmed by the patient and me. Plan: The risks, benefits, expected outcome, and alternative to the recommended procedure have been discussed with the patient. Patient understands and wants to proceed with the procedure.      Electronically signed by JENNIFER Arriaga on 1/17/2023 at 7:36 AM

## 2023-01-17 NOTE — PERIOP NOTE
TRANSFER - OUT REPORT:    Verbal report given to Keagan Hinojosa RN on Rajan Peñaloza  being transferred to [facility]  for routine progression of patient care       Report consisted of patient's Situation, Background, Assessment and   Recommendations(SBAR). Information from the following report(s) Nurse Handoff Report, Adult Overview, Surgery Report, MAR, and Cardiac Rhythm SR  was reviewed with the receiving nurse. [place] Assessment: No data recorded  Lines:   Peripheral IV 01/17/23 Left; Anterior Forearm (Active)   Site Assessment Clean, dry & intact 01/17/23 1228   Line Status Infusing 01/17/23 [address] Connections checked and tightened 01/17/23 1228   Phlebitis Assessment No symptoms 01/17/23 1228   Infiltration Assessment 0 01/17/23 1228   Dressing Status Clean, dry & intact 01/17/23 1228   Dressing Type Transparent 01/17/23 1228        Opportunity for questions and clarification was provided.       Patient transported with:  O2 @ 0lpm

## 2023-01-17 NOTE — DISCHARGE INSTRUCTIONS
[facility]      Patient Discharge Instructions    Theodore Tran / [id number] : 1955    Admitted 2023 Discharged: 2023     IF YOU HAVE ANY PROBLEMS ONCE YOU ARE AT HOME CALL THE FOLLOWING NUMBERS:   Main office number: [phone]      Medications    The medications you are to continue on are listed on the medication reconciliation sheet. Narcotic pain medications as well as supplemental iron can cause constipation. If this occurs try stopping the narcotic pain medication and/or the iron. It is important that you take the medication exactly as they are prescribed. Medications which increase your risk of blood clots are listed to stop for 5 weeks after surgery as well as medications or supplements which increase your risk of bleeding complications. Keep your medication in the bottles provided by the pharmacist and keep a list of the medication names, dosages, and times to be taken in your wallet. Do not take other medications without consulting your doctor. Important Information    Do NOT smoke as this will greatly increase your risk of infection! Resume your prehospital diet. If you have excessive nausea or vomitting call your doctor's office     Leg swelling and warmth is normal for 6 months after surgery. If you experience swelling in your leg elevate you leg while laying down with your toes above your heart. If you have sudden onset severe swelling with leg pain call our office. Use Isreal Hose stockings until we see you in the office for your follow up appointment. The stitches deep inside take approximately 6 months to dissolve. There will be sharp shooting, stinging and burning pain. This is normal and will resolve between 3-6 months after surgery. Difficulty sleeping is normal following total Knee and Hip replacement. You may try melatonin, an over-the-counter sleep aid or benadryl to help with sleep.  Most patients will resume sleeping through the night 8 weeks after surgery. Home Physical Therapy is arranged. Home Health will contact you within 48 hrs of discharge that you have chosen. If you have not received a call within this time frame please contact that provider you chose. You should be given this information before you leave the hospital.     You are at a risk for falls. Use the rolling walker when walking. Patients who have had a joint replacement should not drive if they are still taking narcotic pain mediation during the daytime hours. Most patients wean themselves off of pain medication within 2-5 weeks after surgery. When to Call the office    - If you have a temperature greater then 101  - Uncontrolled vomiting   - Loose control of your bladder or bowel function  - Are unable to bear any wieght   - Need a pain medication refill          Total Knee Replacement: What to Expect at  [address] had a total knee replacement. The doctor replaced the worn ends of the bones that connect to your knee (thighbone and lower leg bone) with plastic and metal parts. When you leave the hospital, you should be able to move around with a walker or crutches. But you will need someone to help you at home until you have more energy and can move around better. You will go home with a bandage and stitches, staples, skin glue, or tape strips. Change the bandage as your doctor tells you to. If you have stitches or staples, your doctor will remove them about 2 weeks after your surgery. Glue or tape strips will fall off on their own over time. You may still have some mild pain, and the area may be swollen for a few months after surgery. Your knee will continue to improve for up to a year. You will probably use a walker for some time after surgery. When you are ready, you can use a cane. You may be able to walk without support after a couple weeks, or when you are comfortable.   You will need to do months of physical rehabilitation (rehab) after a knee replacement. Rehab will help you strengthen the muscles of the knee and help you regain movement. After you recover, your artificial knee will allow you to do normal daily activities with less pain or no pain at all. You may be able to hike, dance, or ride a bike. Talk to your doctor about whether you can do more strenuous activities. Always tell your caregivers that you have an artificial knee. How long it will take to walk on your own, return to normal activities, and go back to work depends on your health and how well your rehabilitation (rehab) program goes. The better you do with your rehab exercises, the quicker you will get your strength and movement back. This care sheet gives you a general idea about how long it will take for you to recover. But each person recovers at a different pace. Follow the steps below to get better as quickly as possible. How can you care for yourself at home? Activity    Rest when you feel tired. You may take a nap, but don't stay in bed all day. When you sit, use a chair with arms. You can use the arms to help you stand up. Work with your physical therapist to find the best way to exercise. What you can do as your knee heals will depend on whether your new knee is cemented or uncemented. You may not be able to do certain things for a while if your new knee is uncemented. After your knee has healed enough, you can do more strenuous activities with caution. You can golf, but you may want to use a golf cart for some time. And don't wear shoes with spikes. You can bike on a flat road or on a stationary bike. Talk to your doctor before biking uphill. Your doctor may suggest that you stay away from activities that put stress on your knee. These include tennis, badminton, contact sports like football, jumping (such as in basketball), jogging, and running. Avoid activities where you might fall. Do not sit for more than 1 hour at a time.  Get up and walk around for a while before you sit again. If you must sit for a long time, prop up your leg with a chair or footstool. This will help you avoid swelling.     Ask your doctor when you can drive again. It may take several weeks after knee replacement surgery before it's safe for you to drive.     When you get into a car, sit on the edge of the seat. Then pull in your legs, and turn to face the front.     You should be able to do many everyday activities 3 to 6 weeks after your surgery. You will probably need to take 4 to 16 weeks off from work. When you can go back to work depends on the type of work you do and how you feel.     Ask your doctor when it is okay for you to have sex.     For 12 weeks, do not lift anything heavier than 10 pounds and do not lift weights.   Diet    By the time you leave the hospital, you should be eating your normal diet. If your stomach is upset, try bland, low-fat foods like plain rice, broiled chicken, toast, and yogurt. Your doctor may suggest that you take iron and vitamin supplements.     Drink plenty of fluids (unless your doctor tells you not to).     Eat healthy foods, and watch your portion sizes. Try to stay at your ideal weight. Too much weight puts more stress on your new knee.     You may notice that your bowel movements are not regular right after your surgery. This is common. Try to avoid constipation and straining with bowel movements. Drinking enough fluids, taking a stool softener, and eating foods that are good sources of fiber can help you avoid constipation. If you have not had a bowel movement after a couple of days, talk to your doctor.   Medicines    Your doctor will tell you if and when you can restart your medicines. You will also get instructions about taking any new medicines.     If you stopped taking aspirin or some other blood thinner, your doctor will tell you when to start taking it again.     Your doctor may give you a blood-thinning medicine to prevent blood clots.  If you take a blood thinner, be sure you get instructions about how to take your medicine safely. Blood thinners can cause serious bleeding problems. This medicine could be in pill form or as a shot (injection). If a shot is needed, your doctor will tell you how to do this. Be safe with medicines. Take pain medicines exactly as directed. If the doctor gave you a prescription medicine for pain, take it as prescribed. If you are not taking a prescription pain medicine, ask your doctor if you can take an over-the-counter medicine. Plan to take your pain medicine 30 minutes before exercises. It is easier to prevent pain before it starts than to stop it after it has started. If you think your pain medicine is making you sick to your stomach: Take your medicine after meals (unless your doctor has told you not to). Ask your doctor for a different pain medicine. If your doctor prescribed antibiotics, take them as directed. Do not stop taking them just because you feel better. You need to take the full course of antibiotics. Incision care    If your doctor told you how to care for your cut (incision), follow your doctor's instructions. You will have a dressing over the cut. A dressing helps the incision heal and protects it. Your doctor will tell you how to take care of this. If you did not get instructions, follow this general advice: If you have strips of tape on the cut the doctor made, leave the tape on for a week or until it falls off. If you have stitches or staples, your doctor will tell you when to come back to have them removed. If you have skin glue on the cut, leave it on until it falls off. Skin glue is also called skin adhesive or liquid stitches. Change the bandage every day. Wash the area daily with warm water, and pat it dry. Don't use hydrogen peroxide or alcohol. They can slow healing. You may cover the area with a gauze bandage if it oozes fluid or rubs against clothing.   You may shower 24 to 48 hours after surgery. Pat the incision dry. Don't swim or take a bath for the first 2 weeks, or until your doctor tells you it is okay. Exercise    Your rehab program will give you a number of exercises to do to help you get back your knee's range of motion and strength. Always do them as your therapist tells you. Ice    For pain and swelling, put ice or a cold pack on the area for 10 to 20 minutes at a time. Put a thin cloth between the ice and your skin. If your doctor recommended cold therapy using a portable machine, follow the instructions that came with the machine. Other instructions    Wear compression stockings if your doctor told you to. These may help to prevent blood clots. Your doctor will tell you how long you need to keep wearing the compression stockings. Carry a medical alert card that says you have an artificial joint. You have metal pieces in your knee. These may set off some airport metal detectors. Follow-up care is a key part of your treatment and safety. Be sure to make and go to all appointments, and call your doctor if you are having problems. It's also a good idea to know your test results and keep a list of the medicines you take. When should you call for help? Call 911 anytime you think you may need emergency care. For example, call if:    You passed out (lost consciousness). You have severe trouble breathing. You have sudden chest pain and shortness of breath, or you cough up blood. Call your doctor now or seek immediate medical care if:    You have signs of infection, such as: Increased pain, swelling, warmth, or redness. Red streaks leading from the incision. Pus draining from the incision. A fever. You have signs of a blood clot, such as:  Pain in your calf, back of the knee, thigh, or groin. Redness and swelling in your leg or groin. Your incision comes open and begins to bleed, or the bleeding increases.      You have pain that does not get better after you take pain medicine. Watch closely for changes in your health, and be sure to contact your doctor if:    You do not have a bowel movement after taking a laxative. Where can you learn more? Go to http://www.woods.com/ and enter T054 to learn more about \"Total Knee Replacement: What to Expect at Home. \"  Current as of: March 9, 2022               Content Version: 13.5  © 2006-2022 [employer]. Care instructions adapted under license by [facility] ([facility]). If you have questions about a medical condition or this instruction, always ask your healthcare professional. [address] any warranty or liability for your use of this information. Information obtained by :  I understand that if any problems occur once I am at home I am to contact my physician. I understand and acknowledge receipt of the instructions indicated above.                                                                                                                                            Physician's or R.N.'s Signature                                                                  Date/Time                                                                                                                                              Patient or Representative Signature                                                          Date/Time

## 2023-01-17 NOTE — PROGRESS NOTES
ACUTE PHYSICAL THERAPY GOALS:   (Developed with and agreed upon by patient and/or caregiver.)  GOALS (1-4 days):  (1.)Ms. Kristy Chinchilla will move from supine to sit and sit to supine  in bed with INDEPENDENT. (2.)Ms. Kristy Chinchilla will transfer from bed to chair and chair to bed with SUPERVISION using the least restrictive device. (3.)Ms. Kristy Chinchilla will ambulate with SUPERVISION for 300 feet with the least restrictive device. (4.)Ms. Kristy Chinchilla will ambulate up/down 1 steps without a railing with STAND BY ASSIST.  (5.)Ms. Kristy Chinchilla will increase right knee ROM to 0-90°.  ________________________________________________________________________________________________            PHYSICAL THERAPY JOINT CAMP: TOTAL KNEE ARTHROPLASTY Initial Assessment and PM  (Link to Caseload Tracking: PT Visit Days : 1  Acknowledge Orders  Time In/Out  PT Charge Capture  Rehab Caseload Tracker  Episode   Navid Santamaria is a 69 y.o. female   PRIMARY DIAGNOSIS: Osteoarthritis of right knee  Osteoarthritis of right knee [M17.11]  Arthritis of right knee [M17.11]  Procedure(s) (LRB):  KNEE TOTAL ARTHROPLASTY ROBOTIC-RIGHT VELYS (Right)  Day of Surgery  Reason for Referral: Pain in Right Knee (M25.561)  Stiffness of Right Knee, Not elsewhere classified (M25.661)  Difficulty in walking, Not elsewhere classified (R26.2)  Outpatient in a bed: Payor: [employer]ARE / Plan: [employer] GOLD PLUS HMO / Product Type: *No Product type* /     REHAB RECOMMENDATIONS:   Recommendation to date pending progress:  Setting:  Home Health Therapy    Equipment:    None     RANGE OF MOTION:   Will assess at next session     GAIT: I Mod I S SBA CGA Min Mod Max Total  NT x2 Comments:   Level of Assistance [] [] [] [] [x] [] [] [] [] [] []            Weightbearing Status  Right Lower Extremity Weight Bearing: Weight Bearing As Tolerated    Distance  310 feet    Gait Quality Decreased adams , Decreased step length, and Decreased stance    DME Gait Belt and Rolling Walker     Stairs Stairs/Curb  Stairs?: Yes  Stairs  # Steps : 1  Rails: None  Device: Rolling walker  Assistance: Contact guard assistance    Ramp     I=Independent, Mod I=Modified Independent, S=Supervision, SBA=Standby Assistance, CGA=Contact Guard Assistance,   Min=Minimal Assistance, Mod=Moderate Assistance, Max=Maximal Assistance, Total=Total Assistance, NT=Not Tested    ASSESSMENT:   ASSESSMENT:  Ms. Colleen Hernandez presents s/p R TKA and demonstrates decreased LE strength, ROM, and independence with mobility. Patient had a L TKA in September of last year and is independent at baseline. Patient would benefit from therapy to address above deficits. She will have assist from family at d/c. Patient did well with today's session. Good demonstration of all exercises, ambulated well with RW, and negotiated single step with RW. Patient educated in HEP, use of ice, positioning, and ambulation with RW. Patient with good understanding of all d/c instructions from prior TJR. Outcome Measure:   KOOS-JR:  KOJr JOHN. Knee Survey Score: 14    SUBJECTIVE:   Ms. Colleen Hernandez agreeable.   Home Environment/Prior Level of Function Lives With: Family  Type of Home: House  Home Layout: One level  Home Access: Stairs to enter without rails  Entrance Stairs - Number of Steps: 1  Bathroom Shower/Tub: Walk-in shower  Bathroom Equipment: Grab bars in shower, Built-in shower seat, 3-in-1 commode  Home Equipment: summer [place]    OBJECTIVE:     PAIN: VITAL SIGNS: LINES/DRAINS:   Pre Treatment:  Pain Assessment: 0-10  Pain Level: 4      Post Treatment: 4 Vitals        Oxygen    None    RESTRICTIONS/PRECAUTIONS:  Restrictions/Precautions: Weight Bearing  Right Lower Extremity Weight Bearing: Weight Bearing As Tolerated        Restrictions/Precautions: Weight Bearing        LOWER EXTREMITY GROSS EVALUATION:  RIGHT LE   Within Functional Limits   Abnormal   Comments   Strength [] []  2+/3-   Range of Motion [] []  S/p TKA      LEFT LE Within Functional Limits   Abnormal   Comments   Strength [x] []     Range of Motion [x] []       UPPER EXTREMITY GROSS EVALUATION:     Within Functional Limits   Abnormal   Comments   Strength [x] []    Range of Motion [x] []      SENSATION  Sensation [x]       COGNITION/  PERCEPTION: Intact Impaired (Comments):   Orientation [x]     Vision [x]     Hearing [x]     Cognition  [x]       MOBILITY: I Mod I S SBA CGA Min Mod Max Total  NT x2 Comments:   Bed Mobility    Rolling [] [] [] [] [] [] [] [] [] [] []    Supine to Sit [] [] [] [x] [] [] [] [] [] [] []    Scooting [] [] [] [x] [] [] [] [] [] [] []    Sit to Supine [] [] [] [] [] [] [] [] [] [] []    Transfers    Sit to Stand [] [] [] [] [x] [] [] [] [] [] []    Bed to Chair [] [] [] [] [x] [] [] [] [] [] []    Stand to Sit [] [] [] [] [x] [] [] [] [] [] []    Stand Pivot [] [] [] [] [] [] [] [] [] [] []    Toilet [] [] [] [] [x] [] [] [] [] [] []     [] [] [] [] [] [] [] [] [] [] []    I=Independent, Mod I=Modified Independent, S=Supervision, SBA=Standby Assistance, CGA=Contact Guard Assistance,   Min=Minimal Assistance, Mod=Moderate Assistance, Max=Maximal Assistance, Total=Total Assistance, NT=Not Tested    BALANCE: Good Fair+ Fair Fair- Poor NT Comments   Sitting Static [x] [] [] [] [] []    Sitting Dynamic [x] [] [] [] [] []              Standing Static [] [x] [] [] [] []    Standing Dynamic [] [] [x] [] [] []      PLAN:   FREQUENCY AND DURATION: BID for duration of hospital stay or until stated goals are met, whichever comes first.    THERAPY PROGNOSIS: Good    PROBLEM LIST:   (Skilled intervention is medically necessary to address:)  Decreased ADL/Functional Activities, Decreased Activity Tolerance, Decreased AROM/PROM, Decreased Gait Ability, Decreased Strength, Decreased Transfer Abilities, and Increased Pain   INTERVENTIONS PLANNED:   (Benefits and precautions of physical therapy have been discussed with the patient.)  Therapeutic Activity, Therapeutic Exercise/HEP, Gait Training, Modalities, and Education       TREATMENT:   EVALUATION: LOW COMPLEXITY: (Untimed Charge)    TREATMENT:   Therapeutic Activity (34 Minutes): Therapeutic activity included Supine to Sit, Scooting, Transfer Training, Ambulation on level ground, Stair Training, Sitting balance , Standing balance, and exercises as below and d/c education to improve functional Activity tolerance, Balance, Coordination, Mobility, Strength, and ROM. TREATMENT GRID:  THERAPEUTIC  EXERCISES: DATE:  1/17 DATE:   DATE:      AM PM AM PM AM PM    [] [] [] [] [] []   Ankle Pumps  10       Quad Sets  10       Gluteal Sets  10       Hip Abd/ADduction  10       Straight Leg Raises  10       Knee Slides  10       Short Arc Quads  10       Chair Slides  10                         B = bilateral; AA = active assistive; A = active; P = passive      EDUCATION: Education Given To: Patient, Family  Education Provided: Role of Therapy, Plan of Care, Home Exercise Program, Precautions, Transfer Training, Equipment, Fall Prevention Strategies  Education Method: Verbal, Printed Information/Hand-outs  Education Outcome: Verbalized understanding  EDUCATION:  [x] Home Exercises  [x] Fall Precautions  [x] No Pillow Under Knee  [x] D/C Instruction Review [x] Cryocuff  [x] Walker Management/Safety  [] Adaptive Equipment as Needed     AFTER TREATMENT PRECAUTIONS: Bed/Chair Locked, Call light within reach, Chair, Needs within reach, and Visitors at bedside    INTERDISCIPLINARY COLLABORATION:  RN/ PCT and OT/ MERINO    COMPLIANCE WITH PROGRAM/EXERCISE: Will assess as treatment progresses. RECOMMENDATIONS/INTENT FOR NEXT TREATMENT SESSION: Treatment next visit will focus on increasing Ms. Luke's independence with bed mobility, transfers, gait training, strength/ROM exercises, modalities for pain, and patient education.      TIME IN/OUT:  Time In: 1439  Time Out: Nico 59  Minutes: 2204 [facility] Ezio Lloyd, PT

## 2023-01-17 NOTE — ANESTHESIA PRE PROCEDURE
Department of Anesthesiology  Preprocedure Note       Name:  Adrienne Ca   Age:  69 y.o.  :  1955                                          MRN:  [id number]         Date:  2023      Surgeon: Amor Gutierrez):  Eduardo Shanks MD    Procedure: Procedure(s):  KNEE TOTAL ARTHROPLASTY ROBOTIC-RIGHT VELYS    Medications prior to admission:   Prior to Admission medications    Medication Sig Start Date End Date Taking? Authorizing Provider   meloxicam (MOBIC) 7.5 MG tablet Take 7.5 mg by mouth daily    Historical Provider, MD   docusate sodium (COLACE) 100 MG capsule Take 100 mg by mouth 2 times daily. Historical Provider, MD   Ferrous Sulfate Dried (FERROUS SULFATE IRON PO) Take 1 tablet by mouth daily. 65 mg    Historical Provider, MD   Calcium Citrate-Vitamin D (CALCIUM CITRATE + D3 PO) Take 1 tablet by mouth daily. Historical Provider, MD   Ascorbic Acid (VITAMIN C) 500 MG CAPS Take 1 tablet by mouth daily. Historical Provider, MD   aspirin 81 MG EC tablet Take 1 tablet by mouth 2 times daily 9/7/22 10/7/22  JENNIFER Dewitt   acetaminophen (TYLENOL) 500 MG tablet Take 1,000 mg by mouth every 6 hours as needed (breakthrough pain)    Historical Provider, MD   LORazepam (ATIVAN) 1 MG tablet Take 1 mg by mouth 2 times daily as needed for Anxiety.     Historical Provider, MD   linaclotide (LINZESS) 145 MCG capsule Take 145 mcg by mouth every morning (before breakfast)    Historical Provider, MD   albuterol sulfate  (90 Base) MCG/ACT inhaler Inhale 1 puff into the lungs every 4 hours as needed for Wheezing 21   Ar Automatic Reconciliation   amLODIPine (NORVASC) 10 MG tablet Take 10 mg by mouth daily 18   Ar Automatic Reconciliation   carvedilol (COREG) 6.25 MG tablet Take 6.25 mg by mouth 2 times daily (with meals) 17   Ar Automatic Reconciliation   DULoxetine (CYMBALTA) 60 MG extended release capsule Take 60 mg by mouth 2 times daily 17   Ar Automatic Reconciliation   fluticasone (FLONASE) 50 MCG/ACT nasal spray 1 to 2 sprays each nostril daily    Ar Automatic Reconciliation   gabapentin (NEURONTIN) 100 MG capsule Take 400 mg by mouth at bedtime. 5/22/17   Ar Automatic Reconciliation   hydroCHLOROthiazide (HYDRODIURIL) 25 MG tablet Take 25 mg by mouth daily 3/3/17   Ar Automatic Reconciliation   potassium chloride (KLOR-CON) 10 MEQ extended release tablet Take 10 mEq by mouth 2 times daily 4/27/17   Ar Automatic Reconciliation   QUEtiapine (SEROQUEL) 25 MG tablet Take 25 mg by mouth at bedtime 5/30/17   Ar Automatic Reconciliation   rosuvastatin (CRESTOR) 20 MG tablet Take 20 mg by mouth at bedtime 9/18/17   Ar Automatic Reconciliation   Diclofenac Sodium 2 % SOLN Apply 4 Act topically 2 times daily  Patient not taking: No sig reported 5/30/17 9/7/22  Ar Automatic Reconciliation   fenofibrate micronized (LOFIBRA) 200 MG capsule Take 200 mg by mouth 4/13/17 9/7/22  Ar Automatic Reconciliation   Magnesium Oxide 500 MG TABS Take by mouth  Patient not taking: Reported on 9/6/2022 9/7/22  Ar Automatic Reconciliation       Current medications:    Current Facility-Administered Medications   Medication Dose Route Frequency Provider Last Rate Last Admin   • acetaminophen (TYLENOL) tablet 1,000 mg  1,000 mg Oral Once Carlos Goncalves MD       • sodium chloride flush 0.9 % injection 5-40 mL  5-40 mL IntraVENous 2 times per day Carlos Goncalves MD       • sodium chloride flush 0.9 % injection 5-40 mL  5-40 mL IntraVENous PRN Carlos Goncalves MD       • 0.9 % sodium chloride infusion   IntraVENous PRN Carlos Goncalves MD       • ceFAZolin (ANCEF) 2000 mg in sterile water 20 mL IV syringe  2,000 mg IntraVENous On Call to OR JENNIFER Babin       • lactated ringers infusion   IntraVENous Continuous Carlos Goncalves  mL/hr at 01/17/23 0812 100 mL/hr at 01/17/23 0812       Allergies:    Allergies   Allergen Reactions   • Benazepril Other (See Comments)   • Erythromycin  Other (See Comments)     Other reaction(s): Abdominal pain-I  Other reaction(s): Abdominal pain-I  GI upset       Problem List:    Patient Active Problem List   Diagnosis Code    Hypertension I10    Recurrent major depressive disorder, in partial remission (HCC) F33.41    Arthralgia of hip M25.559    Osteoporosis M81.0    Hyperlipemia E78.5    Unilateral primary osteoarthritis, left knee M17.12    Suspected sleep apnea R29.818    Arthritis of left knee M17.12    Osteoarthritis of right knee M17.11    Arthritis of right knee M17.11       Past Medical History:        Diagnosis Date    Allergic rhinitis     Anxiety     Arthritis     Autoimmune hepatitis (HCC)     Pt states diagnosis not accurate- was thought to be hepatitis but was a bile duct that had been cut during surgery    Chronic pain syndrome     Depression     Ear problems     Elevated liver enzymes     resolved per pt    Facet arthritis of lumbar region     GERD (gastroesophageal reflux disease)     Hearing reduced     Hyperlipemia     managed with medication    Hypertension     managed with medication    Iron deficiency anemia     hx    Menopausal disorder     Metabolic syndrome     Osteoporosis     Restrictive lung disease     PRN inhaler-last use 9/2022    Thickened endometrium        Past Surgical History:        Procedure Laterality Date    CHOLECYSTECTOMY      ERCP  05/2021    ORTHOPEDIC SURGERY Left 03/13/2014    hip fracture and sugery- fell at work    OTHER SURGICAL HISTORY  2001    sphincterotomy- Dr. Jorje Hicks Left 9/6/2022    ROBOTIC ASSISTED LEFT KNEE TOTAL ARTHROPLASTY performed by Mckay Cavazos MD at [facility]       Social History:    Social History     Tobacco Use    Smoking status: Never    Smokeless tobacco: Never   Substance Use Topics    Alcohol use:  No                                Counseling given: Not Answered      Vital Signs (Current):   Vitals:    01/17/23 4925 01/17/23 0901 01/17/23 0914 01/17/23 0919   BP: (!) 146/91 137/83 134/63 (!) 126/55   Pulse: 65 60 62 61   Resp: 16 16 16 16   Temp: 98.1 °F (36.7 °C)      TempSrc: Temporal      SpO2: 93% 97% 96% 96%   Weight: 219 lb (99.3 kg)      Height:                                                  BP Readings from Last 3 Encounters:   01/17/23 (!) 126/55   01/11/23 (!) 202/113   09/29/22 138/86       NPO Status: Time of last liquid consumption: 2130                        Time of last solid consumption: 2200                        Date of last liquid consumption: 01/16/23                        Date of last solid food consumption: 01/16/23    BMI:   Wt Readings from Last 3 Encounters:   01/17/23 219 lb (99.3 kg)   01/11/23 222 lb 3.6 oz (100.8 kg)   09/06/22 221 lb 3.2 oz (100.3 kg)     Body mass index is 40.06 kg/m². CBC:   Lab Results   Component Value Date/Time    WBC 6.5 01/11/2023 02:14 PM    RBC 4.99 01/11/2023 02:14 PM    HGB 13.9 01/11/2023 02:14 PM    HCT 43.7 01/11/2023 02:14 PM    MCV 87.6 01/11/2023 02:14 PM    RDW 16.5 01/11/2023 02:14 PM     01/11/2023 02:14 PM       CMP:   Lab Results   Component Value Date/Time     01/11/2023 02:14 PM    K 3.5 01/11/2023 02:14 PM     01/11/2023 02:14 PM    CO2 28 01/11/2023 02:14 PM    BUN 16 01/11/2023 02:14 PM    CREATININE 0.82 01/11/2023 02:14 PM    GFRAA >60 08/23/2022 09:13 AM    LABGLOM >60 01/11/2023 02:14 PM    GLUCOSE 119 01/11/2023 02:14 PM    CALCIUM 10.2 01/11/2023 02:14 PM       POC Tests: No results for input(s): POCGLU, POCNA, POCK, POCCL, POCBUN, POCHEMO, POCHCT in the last 72 hours. Coags:   Lab Results   Component Value Date/Time    PROTIME 12.5 01/11/2023 02:14 PM    INR 0.9 01/11/2023 02:14 PM    APTT 27.4 01/11/2023 02:14 PM       HCG (If Applicable): No results found for: PREGTESTUR, PREGSERUM, HCG, HCGQUANT     ABGs: No results found for: PHART, PO2ART, MVJ0LKD, IKD3GTN, BEART, P4GHRDMC     Type & Screen (If Applicable):   No results found for: LABABO, LABRH    Drug/Infectious Status (If Applicable):  No results found for: HIV, HEPCAB    COVID-19 Screening (If Applicable): No results found for: COVID19        Anesthesia Evaluation  Patient summary reviewed and Nursing notes reviewed  Airway: Mallampati: II  TM distance: >3 FB   Neck ROM: full  Mouth opening: > = 3 FB   Dental: normal exam         Pulmonary:Negative Pulmonary ROS and normal exam  breath sounds clear to auscultation                             Cardiovascular:Negative CV ROS  Exercise tolerance: good (>4 METS),   (+) hypertension:,         Rhythm: regular  Rate: normal                    Neuro/Psych:   Negative Neuro/Psych ROS  (+) psychiatric history:            GI/Hepatic/Renal: Neg GI/Hepatic/Renal ROS  (+) GERD:, morbid obesity          Endo/Other: Negative Endo/Other ROS                    Abdominal:             Vascular: negative vascular ROS.         Other Findings:           Anesthesia Plan      spinal     ASA 3       Induction: intravenous.      Anesthetic plan and risks discussed with patient.              Post-op pain plan if not by surgeon: single peripheral nerve block            Carlos Goncalves MD   1/17/2023

## 2023-01-17 NOTE — ANESTHESIA PROCEDURE NOTES
Spinal Block    Patient location during procedure: OR  End time: 1/17/2023 10:10 AM  Reason for block: primary anesthetic  Staffing  Performed: anesthesiologist   Anesthesiologist: Julien Lynch MD  Spinal Block  Patient position: sitting  Prep: ChloraPrep  Patient monitoring: cardiac monitor, continuous pulse ox and frequent blood pressure checks  Approach: midline  Location: L3/L4  Provider prep: mask and sterile gloves  Needle  Needle type: pencil-tip   Needle gauge: 22 G  Needle length: 3.5 in  Assessment  Events: SAB placement uncomplicated. No paresthesia. Swirl obtained: Yes  CSF: clear  Attempts: 3+  Hemodynamics: stable  Additional Notes  Risks discussed including damage to muscle or nerve. 3 cc 1% lidocaine local injected at needle insertion site. Procedure performed without complication. Patient tolerated procedure well.    Preanesthetic Checklist  Completed: patient identified, IV checked, risks and benefits discussed, surgical/procedural consents, equipment checked, pre-op evaluation, timeout performed, anesthesia consent given, oxygen available and monitors applied/VS acknowledged

## 2023-01-17 NOTE — CARE COORDINATION
Patient is a 69y.o. year old female admitted for Right TKA . Patient plans to return home on discharge. Order received to arrange home health. Patient without preference towards agency. Referral sent to [facility]. Patient denies any equipment needs as patient has a walker and raised toilet seat. Will follow until discharge. 01/17/23 6728   Service Assessment   Patient Orientation Alert and Oriented   Cognition Alert   History Provided By Patient   Primary [address]   PCP Verified by CM Yes   Prior Functional Level Independent in ADLs/IADLs   Current Functional Level Independent in ADLs/IADLs   Can patient return to prior living arrangement Yes   Ability to make needs known: Good   Family able to assist with home care needs: Yes   Would you like for me to discuss the discharge plan with any other family members/significant others, and if so, who? Yes   Services At/After Discharge   Transition of Care Consult (CM Consult) [address] Discharge Home Health;PT   Condition of Participation: Discharge Planning   The Plan for Transition of Care is related to the following treatment goals: improve mobility   The Patient and/or Patient Representative was provided with a Choice of Provider? Patient   Freedom of Choice list was provided with basic dialogue that supports the patient's individualized plan of care/goals, treatment preferences, and shares the quality data associated with the providers?   Yes

## 2023-01-17 NOTE — PROGRESS NOTES
TRANSFER - IN REPORT:    Verbal report received from Dao, [address] on [employer]  being received from PACU for routine post-op      Report consisted of patient's Situation, Background, Assessment and   Recommendations(SBAR). Information from the following report(s) Nurse Handoff Report was reviewed with the receiving nurse. Opportunity for questions and clarification was provided. Assessment completed upon patient's arrival to unit and care assumed.

## 2023-01-17 NOTE — OP NOTE
[facility] 8 Cementled Total Knee Arthroplasty - PS  Patient:Anjana Luke   : 1955  Medical Record Number:[id number]  Pre-operative Diagnosis:  Osteoarthritis of right knee [M17.11]  Arthritis of right knee [M17.11]  Post-operative Diagnosis: Osteoarthritis of right knee [M17.11]  Arthritis of right knee [M17.11]    Surgeon: Melvin Lin MD  Assistant: Sheba Barfield PA-C    This surgical assistant was present for the procedure and vital for the performance of the procedure. He assisted with exposure and retraction during the procedure as well as wound closure and dressing application after the procedure. Anesthesia: Spinal    Procedure: Total Knee Arthroplasty   The complexity of the total joint surgery requires the use of a first assistant for positioning, retraction and assistance in closure. The patient's Body mass index is 40.06 kg/m²., BMI's greater then 35 make surgical exposure and retraction extremely difficult and increase operative time. Tourniquet Time: none  EBL: 150cc  Additional Findings: Severe DJD/ Osteopenia  Releases none    Anjana Nielsen was brought to the operating room and positioned on the operating table. She was anethestized  IV antibiotics were administered per CMS protocol. Prior to the incision being made a timeout was called identifying the patient, procedure ,operative side and surgeon. The right leg was prepped and draped in the usual sterile manner  An anterior longitudinal incision was accomplished just medial to the tibial tubercle and extending approximal 6 centimeters proximal to the superior pole of the patella. A medial parapatellar capsular incision was performed. The medial capsular flap was elevated around to the insertion of the semimembranous tendon. The patella was everted and the knee flexed and externally rotated. The medial and lateral menisci were excised.   The lateral half of the fat pad excised and the patella femoral ligament was released. The anterior cruciate ligament was resected and the posterior cruciate ligament was excised. The Velys arrays were placed in the distal femur and proximal tibia per protocol and the knee was registered. Confirmation of registration with the [address] and surgical plan was made. The knee was balanced with tensioners as part of this process. The tibia and femur were then prepared via the [employer] system with robotic assistance. The meniscal remnants were excised and posterior osteophytes were removed. Flexion and extension gaps were checked with blocks to confirm appropriate balance. The arrays were then removed. The femur was then finished with the notch guide for the PS box. The tibial base plate was pinned into place with the appropriate external rotation and stem site prepared. Given the patient's body habitus, a short tibial stem was placed to augment fixation. A preliminary range of motion was accomplished with the above size trial components. A polyethylene insert allowed the patient to obtain full extension as well as appropriate flexion. The patient's ligaments were stable in flexion and extension to medial and lateral stressing and the alignment was through the appropriate mechanical axis. The patella was then everted. Being in acceptable condition, it was left unresurfaced following patelloplasty. All trial components were removed and the implants were cemented into position and pressurized per routine. The betadine lavage protocol was performed. The operative knee was injected with 60cc of Naropin, 10 cc's of morphine and 1 cc of 30mg of Toradol. The capsular layer was closed using a #1 vicryl suture and a running +1 Stratofix, while subcutaneous layers were closed using 2-0 Vicryl interrupted sutures and a #1 Stratofix. Finally the skin was closed using 3-0 Vicryl and a Zipline closure.  A sterile sterile bandage was applied. An Iceman cryo pad was applied on the operative leg. Sponge count and needle counts were correct. Vanessa Lee left the operating room     Implants:   Implant Name Type Inv.  Item Serial No.  Lot No. LRB No. Used Action   CEMENT BONE 40GM HI VISC PALACOS R - [id number]  CEMENT BONE 40GM HI VISC PALACOS R [id number] [facility] [id number] Right 1 Implanted   CEMENT BONE 40GM HI VISC PALACOS R - [id number]  CEMENT BONE 40GM HI VISC PALACOS R [id number] [facility] [id number] Right 1 Implanted   COMPONENT FEM SZ 5 R KNEE POST STBL PATTI ATTUNE - [id number]  COMPONENT FEM SZ 5 R KNEE POST STBL PATTI ATTUNE [id number] [facility] [id number] Right 1 Implanted   STEM FEM L50MM [id number] KNEE PATTI REV ATTUNE - [id number]  STEM FEM L50MM [id number] KNEE PATTI REV ATTUNE [id number] [facility] [facility] [id number] Right 1 Implanted   BASEPLATE TIB SZ 5 FIX BEAR CO CHROM MOLYBDENUM TI ALLY END - [id number]  BASEPLATE TIB SZ 5 FIX BEAR CO CHROM MOLYBDENUM TI ALLY END [id number] [facility] [id number] Right 1 Implanted   INSERT TIB SZ 5 THK8MM KNEE CRUCE RET FIX BEAR ATTUNE - [id number]  INSERT TIB SZ 5 THK8MM KNEE CRUCE RET FIX BEAR ATTUNE [id number] [facility] [employer] [facility] [id number] Right 1 Implanted     Signed By: Holly Albrecht MD

## 2023-01-17 NOTE — PROGRESS NOTES
OCCUPATIONAL THERAPY Initial Assessment, Daily Note, and PM      (Link to Caseload Tracking: OT Visit Days: 1  OT Orders   Time  OT Charge Capture  Rehab Caseload Tracker  Episode     Suzy Nichole is a 69 y.o. female   PRIMARY DIAGNOSIS: Osteoarthritis of right knee  Osteoarthritis of right knee [M17.11]  Arthritis of right knee [M17.11]  Procedure(s) (LRB):  KNEE TOTAL ARTHROPLASTY ROBOTIC-RIGHT VELYS (Right)  Day of Surgery  Reason for Referral: Pain in Right Knee (M25.561)  Stiffness of Right Knee, Not elsewhere classified (M25.661)  Outpatient in a bed: Payor: [employer] MEDICARE / Plan: [place] HMO / Product Type: *No Product type* /     ASSESSMENT:     REHAB RECOMMENDATIONS:   Recommendation to date pending progress:  Setting:  Home Health Therapy    Equipment:    3 in 1 Bedside Commode  Rolling Walker     ASSESSMENT:  Ms. Katelynn Bellamy is s/p right TKA and presents with decreased independence with functional mobility and activities of daily living as compared to baseline level of function and safety. Patient would benefit from skilled Occupational Therapy to maximize independence and safety with self-care task and functional mobility. Patient able to complete  lower body dressing, upper body dressing, toileting, and grooming at bathroom with contact guard assist .  Mobilized from hospital bed to bathroom using a rolling walker with assist. Then she mobilized down hallway to gym. Patient is hopeful to return home day of surgery. She plans to have support from daughter and should progress well with continued therapy once home.        [facility] AM-PAC 6 Clicks Daily Activity Inpatient Short Form:    AM-PAC Daily Activity Inpatient   How much help for putting on and taking off regular lower body clothing?: A Lot  How much help for Bathing?: A Lot  How much help for Toileting?: A Little  How much help for putting on and taking off regular upper body clothing?: None  How much help for taking care of personal grooming?: None  How much help for eating meals?: None  AM-PAC Inpatient Daily Activity Raw Score: 19  AM-PAC Inpatient ADL T-Scale Score : 40.22  ADL Inpatient CMS 0-100% Score: 42.8  ADL Inpatient CMS G-Code Modifier : CK     SUBJECTIVE:     Ms. Luke states, \"My other one went well\"      Social/Functional   Lives With: Family  Type of Home: House  Home Layout: One level  Home Access: Stairs to enter without rails  Entrance Stairs - Number of Steps: 1  Bathroom Shower/Tub: Walk-in shower  Bathroom Equipment: Grab bars in shower, Built-in shower seat, 3-in-1 commode  Home Equipment: Cane, Walker, rolling    OBJECTIVE:     LINES / DRAINS / AIRWAY: None    RESTRICTIONS/PRECAUTIONS:  Restrictions/Precautions: Weight Bearing  Right Lower Extremity Weight Bearing: Weight Bearing As Tolerated    PAIN: VITALS / O2:   Pre Treatment:          Post Treatment: no c/o pain during session Vitals          Oxygen        GROSS EVALUATION: INTACT IMPAIRED   (See Comments)   UE AROM [x] []   UE PROM [x] []   Strength [x]       Posture / Balance []  Cues for safe use of RW during mobility and daily activities   Sensation [x]     Coordination [x]       Tone []       Edema []    Activity Tolerance []  Decreased from baseline     Hand Dominance R [] L []      COGNITION/  PERCEPTION: INTACT IMPAIRED   (See Comments)   Orientation [x]     Vision [x]     Hearing [x]     Cognition  [x]     Perception [x]       MOBILITY: I Mod I S SBA CGA Min Mod Max Total  NT x2 Comments:   Bed Mobility    Rolling [] [] [] [] [] [] [] [] [] [] []    Supine to Sit [] [] [] [] [x] [] [] [] [] [] []    Scooting [] [] [] [] [x] [] [] [] [] [] []    Sit to Supine [] [] [] [] [] [] [] [] [] [] []    Transfers    Sit to Stand [] [] [] [] [x] [] [] [] [] [] []    Bed to Chair [] [] [] [x] [x] [] [] [] [] [] []    Stand to Sit [] [] [] [] [x] [] [] [] [] [] []    Tub/Shower [] [] [] [] [] [] [] [] [] [] []       Toilet [] [] [] [] [x] [] [] []  [] [] []        [] [] [] [] [] [] [] [] [] [] []    I=Independent, Mod I=Modified Independent, S=Supervision/Setup, SBA=Standby Assistance, CGA=Contact Guard Assistance, Min=Minimal Assistance, Mod=Moderate Assistance, Max=Maximal Assistance, Total=Total Assistance, NT=Not Tested    ACTIVITIES OF DAILY LIVING: I Mod I S SBA CGA Min Mod Max Total NT Comments   BASIC ADLs:              Upper Body Bathing [] [] [] [] [] [] [] [] [] []    Lower Body Bathing [] [] [] [] [] [] [] [] [] []    Toileting [] [] [] [] [x] [] [] [] [] []    Upper Body Dressing [] [] [x] [] [] [] [] [] [] []    Lower Body Dressing [] [] [] [] [x] [x] [] [] [] []    Feeding [] [] [] [] [] [] [] [] [] []    Grooming [] [] [x] [] [] [] [] [] [] []    Personal Device Care [] [] [] [] [] [] [] [] [] []    Functional Mobility [] [] [] [] [x] [] [] [] [] [] RW   I=Independent, Mod I=Modified Independent, S=Supervision/Setup, SBA=Standby Assistance, CGA=Contact Guard Assistance, Min=Minimal Assistance, Mod=Moderate Assistance, Max=Maximal Assistance, Total=Total Assistance, NT=Not Tested    PLAN:     FREQUENCY/DURATION     for duration of hospital stay or until stated goals are met, whichever comes first.    ACUTE OCCUPATIONAL THERAPY GOALS:   (Developed with and agreed upon by patient and/or caregiver.)    GOALS:   DISCHARGE GOALS (in preparation for going home/rehab):  3 days  1.  Ms. Luke will perform lower body dressing activity with minimal assist required to demonstrate improved functional mobility and safety.     2.  Ms. Luke will perform bathing activity with minimal assist required to demonstrate improved functional mobility and safety.  3.  Ms. Luke will perform toileting activity with  contact guard assist to demonstrate improved functional mobility and safety.  4.  Ms. Luke will perform all functional transfers transfer with contact guard assist to demonstrate improved functional mobility and safety.      PROBLEM LIST:  (Skilled intervention is medically necessary to address:)  Decreased ADL/Functional Activities  Decreased Activity Tolerance  Decreased Balance  Decreased Coordination  Decreased Gait Ability  Decreased Safety Awareness  Decreased Transfer Abilities   INTERVENTIONS PLANNED:   (Benefits and precautions of occupational therapy have been discussed with the patient.)  Self Care Training  Therapeutic Activity  Therapeutic Exercise/HEP  Neuromuscular Re-education  Manual Therapy  Education         TREATMENT:     EVALUATION: LOW COMPLEXITY: (Untimed Charge)    TREATMENT:   Co-Treatment PT/OT necessary due to patient's decreased overall endurance/tolerance levels, as well as need for high level skilled assistance to complete functional transfers/mobility and functional tasks  Self Care (20 minutes): Patient participated in toileting, upper body dressing, lower body dressing, personal device care, and grooming ADLs in unsupported sitting and standing with minimal verbal, manual, and tactile cueing to increase independence, increase activity tolerance, and increase safety awareness. Patient also participated in functional mobility, functional transfer, and adaptive equipment training to increase independence, increase activity tolerance, and increase safety awareness.      AFTER TREATMENT PRECAUTIONS: Bed/Chair Locked, Call light within reach, Chair, Needs within reach, RN notified, and Visitors at bedside    INTERDISCIPLINARY COLLABORATION:  RN/ PCT and PT/ PTA    EDUCATION:  Education Given To: Patient, Family  Education Provided: Role of Therapy, Plan of Care, Precautions, ADL Adaptive Strategies, Energy Conservation, Transfer Training, Equipment, Fall Prevention Strategies  Education Method: Demonstration, Verbal  Barriers to Learning: None  Education Outcome: Verbalized understanding, Demonstrated understanding, Continued education needed  [x] Safe And Effective Hygiene  [x] Fall Precautions  [] Hip Precautions  [x] D/C Instruction Review [] Prosthesis Review  [x] Walker Management/Safety  [x] Adaptive Equipment as Needed  [x] Therapeutic Resting Position of Joint       TOTAL TREATMENT DURATION AND TIME:  Time In: 1440  Time Out: 4817  Minutes: [address] [place], [place]

## 2023-01-17 NOTE — RT PROTOCOL NOTE
Respiratory Care Services     Policy Number: [id number]    Title: Oxygen Protocol    Effective Date: 01/1996    Revised Date: 06/2013, 02/29/2016, 4/2018, 7/2019    Reviewed Date: 05/2014, 03/2015, 06/2017, 11/2020        I.  Policy: The Oxygen Protocol will be initiated for all patients upon written order from physician for administration of oxygen therapy or if a patient is found to have an oxygen saturation of 88% or less. Special consideration: the goal of oxygen therapy for COPD patients is to maintain oxygen saturation between 88% - 92% to comply with GOLD Guidelines.    II. Purpose: To provide protocol driven respiratory therapy for the administration of oxygen at concentrations greater than that in ambient air with the intent of treating or preventing the symptoms and manifestations of hypoxia.    III. Responsibility: Director Respiratory Care Services, all Respiratory Care Practitioners     IV. Indications:   Implement this protocol for patients when physician orders oxygen to be administered or when patient is found to have an oxygen saturation of 88% or less.  To assure routine monitoring of patient's oxygen saturation b.i.d. and to make appropriate adjustments in accordance with ordered oxygen saturation parameters.  To assure continuity of respiratory care that meets [facility] Clinical Practice Guidelines and GOLD Guidelines.  Hb < 8  Sickle Cell anemia crisis    V. Assessment:  Assess the following parameters to determine the need to adjust oxygen:  Measurement of patient's oxygen saturation via pulse oximetry.    Observation of patient's color, respiratory effort, and responsiveness.  Measurement of heart rate and respiratory rate.  Complete a three-step ambulatory oxygen saturation when ordered.    VI. Initiation:  Upon receipt of an order for oxygen, the RCP will:   Verify order in the patient's EMR, which should include the desired oxygen saturation to be maintained.  The patient shall be placed on  oxygen with humidity (except for those oxygen delivery devices that do not require humidity, i.e. venturi masks and non-rebreather masks) as ordered by the physician to achieve the prescribed oxygen saturation. In the event that no saturation is specified, a saturation of 90% will be maintained. Patients, who are found to have a SaO2 of 88% or less, may be started on supplemental oxygen as described above. Patients admitted with cardiac problems/disease shall be maintained at 92% per Chest Committee recommendation. The patient will be informed of the \"no smoking policy\" and instructed in the proper use of oxygen therapy. Once desired oxygen saturation has been achieved, the RCP will document FIO2 and oxygen saturation in the respiratory section of the patient's EMR. VII. Maintenance:   30-second oxygen saturation check will be taken to maintain the saturation ordered by the physician each day. Patients will be assessed each shift and as needed by pulse oximetry to determine if oxygen needs to be decreased, increased or discontinued. If changes in FIO2 are indicated, all changes will be documented in the respiratory section of the patient's EMR. If no changes in FIO2 are required, the patient's oxygen flow rate and saturation will be recorded in the respiratory section of the patient's EMR. Per [facility] Pulmonary, patients who are receiving oxygen therapy but are not on oxygen at home, should be weaned off oxygen as soon as possible or when anticipated discharge becomes evident. Oxygen will be discontinued after oxygen saturation has been maintained for 24 hours on room air and documented in the patient's EMR. Patients on the Inpatient Rehabilitation area on 9th floor will be exempt from having their oxygen discontinued per protocol. Oxygen may be weaned but will be changed to prn to meet the needs of the patient when exercising and participating in therapy.   The goal of oxygen therapy is to maintain patients with a diagnosis of COPD at oxygen saturation between 88% - 92% to comply with GOLD Guidelines. VIII. Safety: RCP will address the following safety issues:  Identify patient using the two patient identifiers name and birth date via ID bracelet. Perform hand hygiene per hospital policy utilizing Standard Precautions for all patients and following transmission-based isolation as indicated per hospital policy. Cardiac patients will be maintained at an oxygen saturation of 92%. If a patient's FIO2 requirements necessitate changing oxygen delivery devices to a high concentration of oxygen, documentation indicating the change must be included in the progress notes, as well as in the respiratory flowsheet. If a patient has a hemoglobin level <8 mg. RCP will consult physician before discontinuing oxygen. IX. Interventions:   RCP will assess patient for signs of respiratory distress or suspicion of CO2 retention. An ABG may be obtained for patients exhibiting respiratory distress or sickle cell crisis. An order should be entered into patient's EMR for ABG under per protocol. X. Documentation  Document assessment findings in the respiratory section of the patient's EMR. Document changes in therapy per protocol in the respiratory orders section and in the care plan section of the patient's EMR. Document patient education in the patient education section of the patient's EMR. XI. Reportable Conditions:  Report to the physician immediately:  Acute changes in patient's respiratory status. An oxygen saturation <85%. A change in oxygen delivery device to provide a high concentration of oxygen. XII.  Patient Instructions: Review with Patient  Purpose of oxygen therapy  Proper technique for using oxygen  No smoking policy    Approval: Pulmonary Committee (1-25-96)  Revision: Chest Committee (4-28-05)    L - Respiratory Care Department Policy, Procedure and Protocol Guideline Manual, 1995, Delio Dumont. L - Therapist Driven Respiratory Care Protocols - A Practitioner's Guide for Criteria-Based       Respiratory Care by Scarlet Franklin M.D., and DILAN Salazar RRT. L - The rationale for therapist-driven protocols: an update. Respiratory Care 1998. [facility] Clinical Practice Guidelines. Respiratory Care Services       Policy Number: [id number]    Title: Patient Care Assessment Protocol    Effective Date: 01/1999    Revised Date: 05/2014, 04/2018, 12/2018, 07/2019    Reviewed Date: 06/2013/ 03/2015, 03/2016, 06/2017, 11/2020        Overview  In an effort to improve quality and reduce costs of respiratory care at [facility], the Respiratory Department has developed a number of Patient Care Protocols. These protocols have been developed according to El [employer] and are utilized for those patients who are ordered respiratory therapy using therapeutic indications and standardized approaches for accomplishing objectives. Patient Care Protocols are intended to improve care by:  Defining the indications and standards of care agreed upon by the Pulmonary Medicine and [address] [place] [id number] of [facility]. Training respiratory care practitioners to apply those criteria to individual patients and modify therapy as indicated by the protocols. Documenting the indication and care plan as part of the initial ordering process. Tapering or discontinuing treatments once the indication for therapy changes. The Patient Care Protocols shall be universally applied throughout the hospital as determined by   the Pulmonary Medicine and [address] [place] [id number].     Rationale for Patient Care Assessment Protocols:  Continuous Quality Improvement  Cost containment  Standardization of care  Enhanced continuity of care  Utilization review  Timely intervention    The following patient care assessment protocols have been developed:  Aerosolized Medication Protocol   Bronchial Hygiene Protocol   Oxygen Protocol  CVRU Fast Track Weaning Protocol   Asthma Treatment Protocol ER  Pediatric Asthma Treatment Protocol ER  Alpha-1 Antitrypsin Deficiency Protocol  Prone Positioning Protocol   COPD Protocol   Home Oxygen Assessment Protocol  Ventilator Weaning Protocol   Lung Volume Expansion Protocol    The Director of Respiratory Care Services oversees the Patient Care Assessment Program. The Respiratory Educator is responsible for protocol development and training. The Supervisor is responsible for implementation and  activities. Each patient with an order for respiratory treatments will receive an evaluation. Respiratory Care Practitioners (RCP's) will perform the evaluations. The same evaluation tool will be utilized for initial and follow-up assessments. If the patient does not meet criteria for ordered therapy, the therapy will be discontinued. If the patient demonstrates an adverse response to initially ordered therapy, the therapy will be discontinued and the physician will be contacted. Specific physician's orders that deviate from protocols and are deemed \"inappropriate\" or \"unsafe\" will be addressed with ordering physician and/or medical director as required. Respiratory Patient Care Assessment Protocols    I. Policy: In an effort to provide quality patient care and effective utilization of services, physicians who order respiratory therapy will have their patients treated via the protocols established (see attached) Respiratory Care Practitioners (RCP's) will complete the initial assessment which will indicate patient needs,  the care plan developed and will performed within 24 hours of admission. Frequency of the therapy will be set according to the results of the respiratory therapy evaluation and frequency guidelines policy.  Reassessment will be continued every 48 hours and more frequently as needed for the individual patient. II. Purpose: To provide a process that will allow for ongoing assessment and care plan modification for patients receiving respiratory services based on both objective and or subjective patient responses to interventions. This process of protocol utilization will assist in patient care progression while eliminating the need for the physician to continually update respiratory therapy orders. To assure continuity of respiratory care that meets [facility] Clinical Practice Guidelines. III. Initiation:  Implement Respiratory Care Protocols for patients who are ordered by physician          to receive respiratory therapy procedures or for ventilator management. IV. Protocol:  Upon receiving an order for therapy the RCP will review the patient's EMR (electronic medical record) for all pertinent information including:  [de-identified] order for therapy  Patient history and physical examination  Physician progress notes  Diagnostic. X-rays, PFT's, arterial blood gases etc.  The RCP will perform a respiratory assessment in the following manner:  General observations: color, pattern and effort of breathing, chest expansion, (symmetrical and bilateral), level of consciousness and the ability to ambulate. The RCP will assess patient's cough ability and determine if bronchial hygiene is needed. If patient is unable to produce sputum, at that time, the RCP should question the patient with regard to their sputum: production, color consistency, frequency and amount. Auscultation: Using a stethoscope, the RCP will listen and note quality of breath sounds and presence or absence of adventitious breath sounds in all lung fields, both anteriorly and posteriorly. Upon completing the EMR review and physical assessment, the RCP will document findings in the RT Assessment section of the EMR.  The score level will be provided and will be used to determine the frequency of therapy.    V. Indications:   A.  Bronchial Hygiene Protocol indications:   Potential for or presence of atelectasis.   Need for hydration and removal of retained secretions.  Need for improvement of cough effectiveness.  Presence of conditions associated with disorder of pulmonary clearance:  Cystic fibrosis  Bronchiectasis  Neuromuscular disease  Obstructive lung diseases  Restrictive lung diseases   Aerosolized Medication(s) Protocol indications:Treatment of bronchospasm/wheezing  Improvement of mucociliary clearance  Treatment of stridor  History of Bronchiectasis, Asthma or COPD  Oxygen Therapy Protocol indications:   Documented hypoxemia  Severe trauma  Acute myocardial infarction  Short-term therapy (e.g. post anesthesia recovery)  CVRU Ventilator Weaning Protocol indications:  1. All mechanically ventilated surgical patients unless they have a no wean order.    E. Asthma Treatment Protocol ER indications:  1. Patients 12 years of age and older that have been triaged or diagnosed with   asthma exacerbation shall be indicated for the ER Asthma Treatment Protocol. A physician order will be required to initiate the protocol.   F. Pediatric Asthma protocol in the ER indications:  1. Patients less than 12 years old that have been triaged or diagnosed with asthma exacerbation shall be indicated for the Pediatric Asthma protocol.  A physician order will be required to initiate the protocol.   G. Alpha-1 Antitrypsin Deficiency Testing protocol indications:         1. Patients admitted and diagnosed with COPD.   H. Prone Positioning Protocol indications:         1. Acute lung injury         2. Acute respiratory distress syndrome (ARDS)   I.  Respiratory Care COPD Protocol indications:         1. History of COPD in patient's records         2. Smoking history   J.  Home Oxygen Assessment Protocol indications:         1. Chronic lung disease         2. Cor pulmonale         3. Unable to wean to room air 48 hours prior  to anticipated discharge. K.  Ventilator Weaning Protocol indications:         1. Patient's mechanically ventilated          2. Managed by intensivist  L. Lung Volume Expansion Protocol indications:        1. Any patient at risk for pulmonary complications. VI. Maintenance:    Timely patient assessment is an integral part of this protocol therefore the following will be applied: All non- critical care patients will be evaluated upon receiving initial respiratory care orders within 24 hours and re-evaluated within 48 hours (or more as needed). Orders requesting a Respiratory Consult will be responded to in the following manner: In patient emergency situations, the RCP assigned to the floor will respond immediately to the patient, provide an initial respiratory assessment, and contact the patient's physician as necessary for appropriate orders. In non-emergent situations, the RCP assigned to the floor will respond to the patient within 90 minutes and provide an initial respiratory assessment and contact patient's physician as necessary for appropriate orders. An RCP will provide a comprehensive assessment as soon as possible. Upon completion of an evaluation, the RCP will complete documentation in the patient's EMR in the RT Assessment section. The RCP who completes the assessment will document orders for therapy in the orders section of the patient's EMR selecting new order. Next, per protocol should be selected indicating it is a protocol order and sign orders should be selected to complete the process. The applicable protocol must be added to the progress note per Joint Commission guidelines. The Pharmacy and Therapeutics (P&T) Committee has mandated that the medication Xopenex may be changed to unit dose albuterol without an order, except for those patients receiving Xopenex due to cardiac arrhythmias.    The dosage for these patients should be 0.63 mg. and may be changed from 1.25 mg. to 0.63 mg per P & T Committee by the RCP completing the assessment. Patients who are not experiencing cardiac arrhythmias, and are ordered Xopenex and Atrovent may be changed to Duoneb. VII. Safety: The following safety issues shall be monitored: The RCP will perform hand hygiene per hospital policy utilizing Standard Precautions for all patients and following transmission-based isolation as indicated per hospital policy. The RCP must exercise professional judgment in classifying the patient for frequency of therapy. Appropriate classification of the patient will require an evaluation utilizing the Therapy Assessment Protocol Guidelines. The RCP will confer with the physician concerning the care of the patient at any time questions or problems arise. If during therapy, the patient exhibits no improvement, or deterioration in clinical status the RCP will notify the physician and the patient's nurse. VIII. Interventions: The patient's nurse is responsible concerning all items related to his/her care. Ongoing communication with nursing is essential to successful protocol management. The RCP recognizes the value of the team approach in meeting the patient's needs. Nursing input regarding the patient's pulmonary condition will be sought as needed. IX. Reportable conditions: The RCP will inform the physician if:  There are acute changes in patient's respiratory status. The therapist is unable to determine appropriate care plan upon assessment. The patient fails to reach therapeutic objective. A change or additional medication is needed. X.  Patient Education:    Patient will receive instruction on the following: The treatment modality, including objectives and proper technique of therapy  Respiratory medications  Documentation shall occur in the patient education section of the patient's EMR. XI. Documentation: Record all findings as described above in the patient's EMR.     Related Protocols: A. Aerosolized Medication Protocol  Bronchial Hygiene   Oxygen Protocol   [facility] Fast Track Weaning Protocol  Asthma Treatment protocol ER  Alpha-1 antitrypsin Deficiency Protocol  Prone Positioning Protocol  Respiratory Care COPD Protocol  Home Oxygen assessment Protocol  Ventilator Weaning Protocols   Volume Expansion protocol    Indications      Frequency          Level  A. Aerosol therapy   1.  q4h     Severe SOB, wheezing, unable to sleep 1   2.  qid, q4 wa or q6h   Moderate SOB, wheezing   2   3.  tid      Hx of asthma, or COPD mild wheezing,         or facilitate secretion removal              3   4.  bid      Asthma, or COPD, Intermittent wheezing 4   5. PRN, i.e. tid PRN, qid PRN Asthma, or COPD, occasional wheezing 5       B. Bronchopulmonary Hygiene    1. q4h             Copious secretions, SOB, unable to sleep 1   2. qid & PRN            Moderate amounts of secretions   2   3. tid           Small amounts of secretions and poor cough,               history of secretions    3    4. PRN, i.e. tid PRN, qid PRN     Breathing exercises, encourage cough only 4      C. Oxygen Therapy     Follow hospital approved Oxygen Protocol      Note:  qid treatments are due 0800, 1200, 1600, and 2000. tid treatments are due 0800, 1400, and 2000  Q6h treatments are due 0800, 1400, 2000, 0200  Q4 wa teatments are due 0800, 1200, 1600, and 2000. Q4h treatments are due  0800, 1200, 1600, 2000, 0000, and 0400. The Level 1-5 rating system is only to be used as criteria for determining appropriate frequency of therapy. References:   N   Joint Commission Consolidated Reggie Standard   L    Respiratory Care Department Policy, Procedure and Protocol Guideline Manual, 1995, DILAN Donohue. L  Therapist Driven Respiratory Care Protocols - A Practitioner's Guide for Criteria-Based Respiratory Care by Azul Duncan M.D., and DILAN Clark, MARCY. L  The rationale for therapist-driven protocols: an update.  Respiratory Care 1998; 96:418-754   L Therapist Driven Respiratory Care Protocols - A Practitioner's Guide for Criteria-Based Respiratory Care by Kristel Hylton M.D., and WOODROW Sheehan The rationale for therapist-driven protocols: an update. Respiratory Care 1998; [id number]. N   [facility] Clinical Practice Guidelines. The RCP will perform a respiratory assessment in the following manner:  Perform hand hygiene per hospital policy utilizing Standard Precautions for all patients and following transmission-based isolation as indicated per policy. Identify patient via ID bracelet verifying patient name and birth date. General observations: color, pattern and effort of breathing, chest expansion, (symmetrical and bilateral), level of consciousness and the ability to ambulate. The RCP will assess patients cough ability and determine if Nasotracheal suctioning is needed. If patient is unable to produce sputum, at that time, the RCP should question the patient with regard to their sputum: production, color consistency, frequency and amount. Auscultation: Using a stethoscope, the RCP will listen and note quality of breath sounds and presence or absence of adventitious breath sounds in all lung fields, both anteriorly and posteriorly. Upon completing the EMR review and physical assessment, the RCP will document findings in the RT Assessment section of the EMR. The score level will be provided and will be used to determine the frequency of therapy. V. Indications:   A. Indications for Bronchial Hygiene Protocol will include:  Potential for or presence of atelectasis. Need for hydration and removal of retained secretions. Need for improvement of cough effectiveness.   Presence of conditions associated with disorder of pulmonary clearance:  Cystic fibrosis  Bronchiectasis   Indications for Aerosolized Medication(s) Protocol should include:  Treatment of bronchospasm/wheezing  Improvement of mucociliary clearance  Treatment of stridor  History of Asthma or COPD             C.  Indications for Oxygen Therapy Protocol should include:  Documented hypoxemia  Severe trauma  Acute myocardial infarction  Short-term therapy (e.g. post anesthesia recovery)    VI. Maintenance:    Timely patient assessment is an integral part of this protocol therefore the following will be applied: All non- critical care patients will be evaluated upon receiving initial respiratory care orders within 24 hours and re-evaluated within 48 hours (or more as needed). Orders requesting a Respiratory Consult will be responded to in the following manner: In patient emergency situations, the RCP assigned to the floor will respond immediately to the patient, provide an initial respiratory assessment, and contact the patients physician as necessary for appropriate orders. In non-emergent situations, the RCP assigned to the floor will respond to the patient within 90 minutes and provide an initial respiratory assessment and contact patients physician as necessary for appropriate orders. An RCP will provide a comprehensive assessment as soon as possible. Upon completion of an evaluation, the RCP will complete documentation in the patients EMR in the RT Assessment section. The RCP who completes the assessment will document orders for therapy in the orders section of the patients EMR selecting new order. Next, per protocol should be selected indicating it is a protocol order and sign orders should be selected to complete the process. The Pharmacy and Therapeutics (P&T) Committee has mandated that the medication Xopenex may be changed to unit dose albuterol without an order, except for those patients receiving Xopenex due to cardiac arrhythmias. The dosage for these patients should be 0.63 mg. and may be changed from 1.25 mg. to 0.63 mg per P & T Committee by the RCP completing the assessment.   Patients who are not experiencing cardiac arrhythmias, and are ordered Xopenex and Atrovent may be changed to Duoneb. VII. Safety: The following safety issues shall be monitored: The RCP will perform hand hygiene per hospital policy utilizing Standard Precautions for all patients and following transmission-based isolation as indicated per hospital policy. The RCP must exercise professional judgment in classifying the patient for frequency of therapy. Appropriate classification of the patient will require an evaluation utilizing the Therapy Assessment Protocol Guidelines. The RCP will confer with the physician concerning the care of the patient at any time questions or problems arise. If during therapy, the patient exhibits no improvement or deterioration in clinical status the RCP will notify the physician and the patients nurse. VIII. Interventions: The patients nurse is responsible concerning all items related to his/her care. Ongoing communication with nursing is essential to successful protocol management. The RCP recognizes the value of the team approach in meeting the patients needs. Nursing input regarding the patients pulmonary condition will be sought as needed. IX. Reportable conditions: The RCP will inform the physician if:  There are acute changes in patients respiratory status. The therapist is unable to determine appropriate care plan upon assessment. The patient fails to reach therapeutic objective. A change or additional medication is needed. X.  Patient Education:    Patient will receive instruction on the following: The treatment modality, including objectives and proper technique of therapy  Respiratory medications  Documentation shall occur in the patient education section of the patients EMR. XI. Documentation: Record all findings as described above in the patients EMR. Related Protocols: A.  Aerosolized Medication Protocol  Bronchial Hygiene  Oxygen Protocol   Volume Expansion/Secretion Clearance  Ventilator Weaning Protocols    References:  N   Joint Commission Consolidated Reggie Standard   L    Respiratory Care Department Policy, Procedure and Protocol Guideline Manual, 1995, DILAN GILBERT  Therapist Driven Respiratory Care Protocols - A Practitioners Guide for Criteria-Based Respiratory Care by Fortunato Olmedo M.D., and WOODROW Norris  The rationale for therapist-driven protocols: an update. Respiratory Care 1998; [facility] Guidelines. Respiratory Care Services     Policy Number: [id number]    Title: Bronchial Hygiene Protocol    Effective Date: 01/1999    Revised Date: 12/2014, 11/2017, 7/2019    Reviewed Date: 06/2013, 05/2014, 03/2015, 03/2016, 06/2017, 4/2018, 11/2020     I. Purpose: The Respiratory Care Practitioner (RCP) will utilize the following protocol to select and initiate bronchial hygiene therapy to open and maintain obstructed airways when indicated. II. Patients: All patients who are ordered bronchial hygiene therapy. III. Clinical Area: All general patient floors     IV. Protocol: The following conditions or diseases are indications for bronchial hygiene                           therapy. Oscillating PEP Therapy        Indications should include: Atelectasis caused by mucus plugging or foreign body  Chronic mucociliary clearance disorders  Retained secretions which may be associated with the following conditions:  Bronchitis  Bronchiectasis  Pneumonia  PAP- Positive airway pressure therapy  Indications include:  Patients with post-operative atelectasis or to prevent post operative atelectasis. Patients who cannot perform deep breathing exercises due to pain. Patients requiring lung expansion therapy who cannot follow instructions. Patients requiring lung expansion therapy with poor inspiratory capacity <10cc/kg.   Patients requiring aerosol therapy in conjunction with opening their airways. Vibratory / Acoustical Airway Clearance Therapy (ACT)- i.e. (Vibralung, Vest, or Percussor)  Indications should include  Patient conditions  that involve retained secretions, increased mucus production and defective mucociliary clearance such as:  Cystic fibrosis  Chronic bronchitis  Bronchiectasis  Pneumonia  Asthma  Muscular dystrophy  Post-operative atelectasis  Neuromuscular respiratory impairments  ACT may be considered in patients with COPD with  symptomatic secretion retention, guided by patient preference,  toleration, and effectiveness of therapy Alexus Krishnamurthy et al., 2013). Nasotracheal suctioning indications should include:  Inability to cough effectively  Excessive secretions  Artificial airway      V. Equipment:   A. PEP therapy device   B. Vest therapy equipment   [place]   D. AccuPap   [place] NT suction equipment       VI. Guidelines:   Monitor patient's vital signs and evaluate patient's clinical status. The need to                                change therapy modality may be indicated by:  Change in patient's sensorium (patient now confused or obtunded, and unable to follow directions). A significant deterioration is evident on patient's chest radiograph or increased sputum production. Increased thickening of secretions (e.g. mucolytic therapy may be indicated.)  Development of wheezing  Decrease in oxygen saturation  Development of chest pain. VII. Clinical Responsibility: The Therapy Assessment Protocol guidelines will be used to                re-evaluate all patients on bronchial hygiene therapy (See Therapy Assessment Protocol). RCP's will perform changes in therapy according to protocol. .  Bronchial hygiene therapy may be discontinued when goals of therapy are met, e.g., secretions easily expectorated for 48 hours, atelectasis is resolved, etc.  PAP Therapy may be utilized in place of IPPB therapy per discretion of the RCP, as approved by the Pulmonary Medicine and [address] [place] [address]. VIII. Outcome Criteria:  Outcome criteria for bronchial hygiene therapy should include:  Decrease in sputum production  Improved breath sounds  Improved arterial oxygen tension and/or SaO2  Improved chest X-ray  Subjective response to therapy    IX. Documentation  Document assessment findings in the respiratory assessment section of the patient's EMR. Document changes in therapy per protocol in the respiratory orders section and in the care plan section of the patient's EMR. Document patient education in the patient education section of the patient's EMR. X. Related Protocols:  Respiratory Patient Care Assessment Protocols  Aerosolized Medication Protocol  Oxygen Therapy Protocol        Reference:    L - Respiratory Care Department Policy, Procedure and Protocol Guideline Manual, 1995, DILAN Donohue. L - Therapist Driven Respiratory Care Protocols - A Practitioner's Guide for Criteria-Based         Respiratory Care by Ezio De La Fuente M.D., and DILAN Carreon, MARCY. N - [facility] Clinical Practice Guidelines. Raina Renae., Regina Arteaga., Malinda Ni., Nitin Amador., Bobbi Norman., . . . DAVID Gar (2013, December). [facility] Clinical Practice Guideline: Effectiveness of Nonpharmacologic Airway Clearance Therapies in Hospitalized Patients. Respiratory Care, 58(12), 4257-9876. Retrieved June 28, 2019        Respiratory Care Services Policy Number: [id number]    Title: Aerosolized Medication Protocol    Effective Date: 10/1998    Revised Date: 06/13, 03/16, 11/17, 07/19     Reviewed Date: 05/14/ 03/15 , 06/17, 5/18, 11/2020   I. Policy: The Aerosolized Medication Protocol shall by implemented by Respiratory Care Practitioners (RCP) for patients with orders to receive aerosol therapy with medication. II. Purpose:   To open and maintain obstructed airways, the RCP, will utilize the following   protocol to select the indicated aerosolized medication(s) and determine the most effective method of delivery to the patient. III. Patient Type: All patients who are determined to meet aerosolized medication criteria as          outlined in this protocol. IV. Responsibility: Director, [address], registered Respiratory Care Practitioners (RCP's) with documented competency in the performance of                                     respiratory therapeutic techniques. V. Equipment needed:  Stethoscope  Pulse oximeter  AeroEclipse nebulizer  Meter Dose Inhaler (MDI)    VI. Protocol: The following conditions are accepted indications for aerosolized medication therapy. Bronchospasm/wheezing  Impaired mucociliary clearance  Tracheobronchial mucosal congestion/and laryngeal stridor  Diseases which commonly require aerosolized medication therapy include, but are not limited to:  Asthma/reactive airway disease  Bronchitis/emphysema (COPD)  Cystic fibrosis  Severe laryngitis/tracheitis  Bronchiectasis  Smoke inhalation or chemical trauma to the lung or upper airway  Physical trauma to the upper airway  Laryngotracheobronchitis  Bronchiolitis  Non-specific wheezing              B. Indications for bronchodilator medications will include:  Bronchospasm/ wheezing  Asthma/reactive airway disease  Chronic obstructive pulmonary disease  Obstructive defect on pulmonary function testing  Administration of medications  If a bronchodilator or any other type of respiratory medication is needed, a physician order must be indicated in the medication section in the patient's EMR. When the physician specifies the medication and dosage at the time of request, the ordered medication will be used as part of the care plan. The following guidelines will be utilized in the evaluation and selection of the appropriate delivery device for indicated medication(s):  MDI is the preferred method of medication delivery via common canister.   Patient should be alert/cooperative  Able to perform 3 second breath hold. Patient may be changed to self-administer as appropriate. Patients in isolation will be provided an individual inhaler. Unassisted aerosol (UA) is indicated if  Ventilation is inadequate  Patient is unable to perform MDI appropriately     VII. Guidelines:   Monitor patient's vital signs and evaluate patient's clinical status. The need to change medication and/or modality may be indicated by:  A pulse greater than 120 bpm, or if a pulse increase of 20 bpm occurs with bronchodilator medications. Significant worsening of dyspnea or wheezing occurring during or within 30 minutes of discontinuing therapy. Worsening of patient's sensorium (e.g. patient becomes confused or obtunded, and unable to follow directions). Worsening of patient's chest x-ray. Change in sputum (e.g. increased pulmonary infiltrate, which might indicate need for volume expansion therapy). Patient has difficulty coughing up secretions, which might indicate need for acetylcysteine and/or bronchial hygiene therapy. Call physician immediately if dyspnea worsens and is not responsive to modifications allowed by protocol. VIII. Clinical Responsibility:  The therapy assessment guidelines will be used to evaluate all patients receiving aerosolized medications with the exception of critical care areas. RCP's will perform changes in therapy per protocol. It will be the responsibility of RCP to provide instruction regarding respiratory medications, possible side effects, aerosol therapy and proper MDI technique, as well as, spacer usage to patients ordered MDI therapy. Current therapy that is part of a patient's home regimen will not be discontinued. Provide spacer and educate patient on proper inhaler technique if needed. IX. Documentation  Document assessment findings in the respiratory assessment section of the patient's EMR.   Document changes in therapy per protocol in the respiratory orders section and in the care plan section of the patient's EMR. Document patient education in the patient education section of the patient's EMR. X. Outcome Criteria:  Relief of wheezes and obstruction  Improved cough and sputum color and consistency  Improved chest x-ray  Improved arterial oxygen tension and or SaO2  Improved Peak Flow on asthmatic patients        XI. Related Policy/Protocols:  Respiratory Patient Care Protocols  Bronchial Hygiene Therapy  Oxygen Protocol  [facility] Administration of Metered Dose Inhalers via a Common Canister  [facility] Clinical Resources   [facility] Aerosol Generating Procedures  Reference:  [address] [place] [address], Procedure and Protocol Guideline Manual, 1995, DILAN Donohue. L -  Therapist Driven Respiratory Care Protocols - A Practitioner's Guide for Criteria-Based Respiratory Care by Aakash Rivera M.D., and DILAN Jeffery, RRT. L - The rationale for therapist-driven protocols: an update. Respiratory Care 1998; [id number].  -[facility] Clinical Practice Guidelines.

## 2023-01-17 NOTE — DISCHARGE SUMMARY
[address] [place] [address]  Total Joint Discharge Summary      Patient ID:  Jez Granado  [id number]  89 y.o.  1955    Admit date: 1/17/2023  Discharge date and time: 1-17-23  Admitting Physician: Too Foss MD  Surgeon: Same  Admission Diagnoses: Osteoarthritis of right knee [M17.11]  Arthritis of right knee [M17.11]  Discharge Diagnoses: Principal Problem:    Osteoarthritis of right knee  Active Problems:    Arthritis of right knee  Resolved Problems:    * No resolved hospital problems. *                              Perioperative Antibiotics: Ancef 1 to 3 g was given depending on patient's weight.  If allergic to Ancef or due to other indications, patient was given Vancomycin/Gent per protocol      Hospital Medications given:   amLODIPine, 10 mg, Daily  carvedilol, 6.25 mg, BID WC  docusate sodium, 100 mg, BID  DULoxetine, 60 mg, BID  fluticasone, 2 spray, Daily  gabapentin, 400 mg, Nightly  hydroCHLOROthiazide, 25 mg, Daily  [START ON 1/18/2023] linaclotide, 145 mcg, QAM AC  potassium chloride, 10 mEq, BID  QUEtiapine, 25 mg, Nightly  rosuvastatin, 20 mg, Nightly  sodium chloride flush, 5-40 mL, 2 times per day  acetaminophen, 650 mg, Q6H  ceFAZolin (ANCEF) IVPB, 2,000 mg, Q8H  sennosides-docusate sodium, 1 tablet, BID  aspirin, 81 mg, BID  celecoxib, 200 mg, Daily  [START ON 1/18/2023] dexamethasone, 10 mg, Q6H      lactated ringers, Last Rate: 100 mL/hr at 01/17/23 0952  sodium chloride  sodium chloride      albuterol, 2.5 mg, Q6H PRN  LORazepam, 1 mg, BID PRN  sodium chloride flush, 5-40 mL, PRN  sodium chloride, , PRN  oxyCODONE, 5 mg, Q4H PRN   Or  oxyCODONE, 10 mg, Q4H PRN  HYDROmorphone, 0.5 mg, Q3H PRN   Or  HYDROmorphone, 1 mg, Q3H PRN  promethazine, 25 mg, Q6H PRN   Or  ondansetron, 4 mg, Q6H PRN  aluminum & magnesium hydroxide-simethicone, 15 mL, Q6H PRN  diphenhydrAMINE, 25 mg, Q6H PRN   Or  diphenhydrAMINE, 25 mg, Q6H PRN  naloxone, 0.4 mg, PRN  methocarbamol, 750 mg, 4x Daily PRN        Discharge Medications given:  Current Discharge Medication List        START taking these medications    Details   celecoxib (CELEBREX) 200 MG capsule Take 1 capsule by mouth daily  Qty: 30 capsule, Refills: 0      methocarbamol (ROBAXIN) 750 MG tablet Take 1 tablet by mouth 4 times daily as needed (muscle spasms)  Qty: 40 tablet, Refills: 0      oxyCODONE (ROXICODONE) 5 MG immediate release tablet Take 1-2 tablets by mouth every 4 hours as needed for Pain for up to 7 days. Max Daily Amount: 60 mg  Qty: 60 tablet, Refills: 0    Comments: Reduce doses taken as pain becomes manageable  Associated Diagnoses: Total knee replacement status, right      promethazine (PHENERGAN) 25 MG tablet Take 1 tablet by mouth every 6 hours as needed for Nausea  Qty: 40 tablet, Refills: 0           CONTINUE these medications which have CHANGED    Details   aspirin 81 MG EC tablet Take 1 tablet by mouth 2 times daily  Qty: 60 tablet, Refills: 0           CONTINUE these medications which have NOT CHANGED    Details   docusate sodium (COLACE) 100 MG capsule Take 100 mg by mouth 2 times daily. Ferrous Sulfate Dried (FERROUS SULFATE IRON PO) Take 1 tablet by mouth daily. 65 mg      Calcium Citrate-Vitamin D (CALCIUM CITRATE + D3 PO) Take 1 tablet by mouth daily. Ascorbic Acid (VITAMIN C) 500 MG CAPS Take 1 tablet by mouth daily. acetaminophen (TYLENOL) 500 MG tablet Take 1,000 mg by mouth every 6 hours as needed (breakthrough pain)      LORazepam (ATIVAN) 1 MG tablet Take 1 mg by mouth 2 times daily as needed for Anxiety.       linaclotide (LINZESS) 145 MCG capsule Take 145 mcg by mouth every morning (before breakfast)      albuterol sulfate  (90 Base) MCG/ACT inhaler Inhale 1 puff into the lungs every 4 hours as needed for Wheezing      amLODIPine (NORVASC) 10 MG tablet Take 10 mg by mouth daily      carvedilol (COREG) 6.25 MG tablet Take 6.25 mg by mouth 2 times daily (with meals)      DULoxetine (CYMBALTA) 60 MG extended release capsule Take 60 mg by mouth 2 times daily      fluticasone (FLONASE) 50 MCG/ACT nasal spray 1 to 2 sprays each nostril daily      gabapentin (NEURONTIN) 100 MG capsule Take 400 mg by mouth at bedtime. hydroCHLOROthiazide (HYDRODIURIL) 25 MG tablet Take 25 mg by mouth daily      potassium chloride (KLOR-CON) 10 MEQ extended release tablet Take 10 mEq by mouth 2 times daily      QUEtiapine (SEROQUEL) 25 MG tablet Take 25 mg by mouth at bedtime      rosuvastatin (CRESTOR) 20 MG tablet Take 20 mg by mouth at bedtime           STOP taking these medications       meloxicam (MOBIC) 7.5 MG tablet Comments:   Reason for Stopping:         Diclofenac Sodium 2 % SOLN Comments:   Reason for Stopping:         fenofibrate micronized (LOFIBRA) 200 MG capsule Comments:   Reason for Stopping:         Magnesium Oxide 500 MG TABS Comments:   Reason for Stopping:                Additional DVT Prophylaxis:  MAINE Hose,Plexi-Pulse    Postoperative transfusions:   none  Post Op complications: none    Hemoglobin at discharge:   Lab Results   Component Value Date/Time    HGB 13.9 01/11/2023 02:14 PM       Wound appears to be healing without any evidence of infection. Physical Therapy started on the day following surgery and progressed to independent ambulation with the aid of a walker. At the time of discharge, able to go up and down stairs and had understanding of precautions needed following surgery.       PT/OT:                             Discharged to: home    Discharge instructions:  -Rx pain medication given  - Anticoagulate with: Ecotrin 81 mg PO BID x 4 weeks  -Resume pre hospital diet             -Resume home medications per medical continuation form     -Ambulate with walker, appropriate total joint protocol  -Follow up in office as scheduled       Signed:  JENNIFER Pleitez  1/17/2023  1:14 PM

## 2023-01-17 NOTE — ANESTHESIA POSTPROCEDURE EVALUATION
Department of Anesthesiology  Postprocedure Note    Patient: Viktoria Yan  MRN: [id number]  YOB: 1955  Date of evaluation: 1/17/2023      Procedure Summary     Date: 01/17/23 [place] [facility] [place] [facility] [place] [facility]    Anesthesia Start: 0952 Anesthesia Stop: 9922    Procedure: KNEE TOTAL ARTHROPLASTY ROBOTIC-RIGHT VELYS (Right: Knee) Diagnosis:       Osteoarthritis of right knee      (Osteoarthritis of right knee [M17.11])    Surgeons: Tyson Orozco MD Responsible Provider: Lara Mercado MD    Anesthesia Type: spinal ASA Status: 3          Anesthesia Type: No value filed.     Basim Phase I: Basim Score: 7    Basim Phase II:        Anesthesia Post Evaluation    Patient location during evaluation: PACU  Patient participation: complete - patient participated  Level of consciousness: awake and alert  Airway patency: patent  Nausea & Vomiting: no nausea and no vomiting  Complications: no  Cardiovascular status: hemodynamically stable  Respiratory status: acceptable, nonlabored ventilation and spontaneous ventilation  Hydration status: euvolemic  Comments: BP (!) 110/56   Pulse 66   Temp 97.7 °F (36.5 °C) (Infrared)   Resp 16   Ht 5' 2\" (1.575 m)   Wt 219 lb (99.3 kg)   SpO2 96%   BMI 40.06 kg/m²     Multimodal analgesia pain management approach

## 2023-01-18 ENCOUNTER — TELEPHONE (OUTPATIENT)
Dept: ORTHOPEDICS UNIT | Age: 68
End: 2023-01-18

## 2023-01-18 ENCOUNTER — HOME CARE VISIT (OUTPATIENT)
Dept: SCHEDULING | Facility: HOME HEALTH | Age: 68
End: 2023-01-18

## 2023-01-18 VITALS
TEMPERATURE: 98.1 F | SYSTOLIC BLOOD PRESSURE: 118 MMHG | DIASTOLIC BLOOD PRESSURE: 68 MMHG | HEART RATE: 75 BPM | RESPIRATION RATE: 16 BRPM | OXYGEN SATURATION: 99 %

## 2023-01-18 PROCEDURE — G0151 HHCP-SERV OF PT,EA 15 MIN: HCPCS

## 2023-01-18 PROCEDURE — 0221000100 HH NO PAY CLAIM PROCEDURE

## 2023-01-18 ASSESSMENT — ENCOUNTER SYMPTOMS
DYSPNEA ACTIVITY LEVEL: AFTER AMBULATING MORE THAN 20 FT
PAIN LOCATION - PAIN QUALITY: DULL

## 2023-01-20 ENCOUNTER — HOME CARE VISIT (OUTPATIENT)
Dept: SCHEDULING | Facility: HOME HEALTH | Age: 68
End: 2023-01-20

## 2023-01-20 VITALS
SYSTOLIC BLOOD PRESSURE: 138 MMHG | RESPIRATION RATE: 16 BRPM | DIASTOLIC BLOOD PRESSURE: 80 MMHG | TEMPERATURE: 98.4 F | HEART RATE: 84 BPM | OXYGEN SATURATION: 95 %

## 2023-01-20 PROCEDURE — G0157 HHC PT ASSISTANT EA 15: HCPCS

## 2023-01-23 ENCOUNTER — HOME CARE VISIT (OUTPATIENT)
Dept: SCHEDULING | Facility: HOME HEALTH | Age: 68
End: 2023-01-23
Payer: MEDICARE

## 2023-01-23 VITALS
HEART RATE: 87 BPM | SYSTOLIC BLOOD PRESSURE: 122 MMHG | DIASTOLIC BLOOD PRESSURE: 78 MMHG | RESPIRATION RATE: 17 BRPM | OXYGEN SATURATION: 95 % | TEMPERATURE: 97.9 F

## 2023-01-23 PROCEDURE — G0157 HHC PT ASSISTANT EA 15: HCPCS

## 2023-01-24 ENCOUNTER — TELEPHONE (OUTPATIENT)
Dept: ORTHOPEDIC SURGERY | Age: 68
End: 2023-01-24

## 2023-01-25 ENCOUNTER — HOME CARE VISIT (OUTPATIENT)
Dept: SCHEDULING | Facility: HOME HEALTH | Age: 68
End: 2023-01-25
Payer: MEDICARE

## 2023-01-25 VITALS
RESPIRATION RATE: 16 BRPM | HEART RATE: 87 BPM | SYSTOLIC BLOOD PRESSURE: 124 MMHG | DIASTOLIC BLOOD PRESSURE: 72 MMHG | OXYGEN SATURATION: 96 % | TEMPERATURE: 98 F

## 2023-01-25 DIAGNOSIS — M17.11 PRIMARY OSTEOARTHRITIS OF RIGHT KNEE: Primary | ICD-10-CM

## 2023-01-25 PROCEDURE — G0151 HHCP-SERV OF PT,EA 15 MIN: HCPCS

## 2023-01-25 RX ORDER — OXYCODONE HYDROCHLORIDE 5 MG/1
5-10 TABLET ORAL EVERY 4 HOURS PRN
Qty: 60 TABLET | Refills: 0 | Status: SHIPPED | OUTPATIENT
Start: 2023-01-25 | End: 2023-02-01

## 2023-01-25 ASSESSMENT — ENCOUNTER SYMPTOMS: PAIN LOCATION - PAIN QUALITY: ACHE, SORE

## 2023-01-27 ENCOUNTER — HOME CARE VISIT (OUTPATIENT)
Dept: SCHEDULING | Facility: HOME HEALTH | Age: 68
End: 2023-01-27
Payer: MEDICARE

## 2023-01-27 VITALS
TEMPERATURE: 98.1 F | DIASTOLIC BLOOD PRESSURE: 68 MMHG | OXYGEN SATURATION: 99 % | RESPIRATION RATE: 16 BRPM | HEART RATE: 76 BPM | SYSTOLIC BLOOD PRESSURE: 111 MMHG

## 2023-01-27 PROCEDURE — G0151 HHCP-SERV OF PT,EA 15 MIN: HCPCS

## 2023-01-27 ASSESSMENT — ENCOUNTER SYMPTOMS: PAIN LOCATION - PAIN QUALITY: DULL, ACHE

## 2023-01-30 ENCOUNTER — HOME CARE VISIT (OUTPATIENT)
Dept: SCHEDULING | Facility: HOME HEALTH | Age: 68
End: 2023-01-30
Payer: MEDICARE

## 2023-01-30 VITALS
SYSTOLIC BLOOD PRESSURE: 138 MMHG | DIASTOLIC BLOOD PRESSURE: 86 MMHG | RESPIRATION RATE: 16 BRPM | OXYGEN SATURATION: 94 % | HEART RATE: 87 BPM | TEMPERATURE: 98.4 F

## 2023-01-30 DIAGNOSIS — Z96.651 S/P TOTAL KNEE ARTHROPLASTY, RIGHT: Primary | ICD-10-CM

## 2023-01-30 PROCEDURE — G0151 HHCP-SERV OF PT,EA 15 MIN: HCPCS

## 2023-01-30 ASSESSMENT — ENCOUNTER SYMPTOMS: PAIN LOCATION - PAIN QUALITY: ACHE

## 2023-02-02 ENCOUNTER — HOME CARE VISIT (OUTPATIENT)
Dept: SCHEDULING | Facility: HOME HEALTH | Age: 68
End: 2023-02-02
Payer: MEDICARE

## 2023-02-02 VITALS
SYSTOLIC BLOOD PRESSURE: 150 MMHG | TEMPERATURE: 98.2 F | DIASTOLIC BLOOD PRESSURE: 85 MMHG | RESPIRATION RATE: 18 BRPM | OXYGEN SATURATION: 95 % | HEART RATE: 86 BPM

## 2023-02-02 PROCEDURE — G0157 HHC PT ASSISTANT EA 15: HCPCS

## 2023-02-02 ASSESSMENT — ENCOUNTER SYMPTOMS: PAIN LOCATION - PAIN QUALITY: SORENESS

## 2023-02-06 ENCOUNTER — HOME CARE VISIT (OUTPATIENT)
Dept: SCHEDULING | Facility: HOME HEALTH | Age: 68
End: 2023-02-06
Payer: MEDICARE

## 2023-02-06 DIAGNOSIS — M17.11 PRIMARY OSTEOARTHRITIS OF RIGHT KNEE: ICD-10-CM

## 2023-02-06 DIAGNOSIS — Z96.652 HISTORY OF TOTAL KNEE ARTHROPLASTY, LEFT: Primary | ICD-10-CM

## 2023-02-06 PROCEDURE — G0151 HHCP-SERV OF PT,EA 15 MIN: HCPCS

## 2023-02-06 RX ORDER — HYDROCODONE BITARTRATE AND ACETAMINOPHEN 7.5; 325 MG/1; MG/1
1-2 TABLET ORAL EVERY 4 HOURS PRN
Qty: 60 TABLET | Refills: 0 | Status: SHIPPED | OUTPATIENT
Start: 2023-02-06 | End: 2023-02-13

## 2023-02-06 RX ORDER — METHOCARBAMOL 750 MG/1
TABLET, FILM COATED ORAL
Qty: 40 TABLET | Refills: 0 | Status: SHIPPED | OUTPATIENT
Start: 2023-02-06

## 2023-02-06 NOTE — TELEPHONE ENCOUNTER
She is needing a refill and would like to maybe try hydrocodone instead of oxycodone. She also needs a refill on Robaxin. [facility] [place] is the correct pharmacy.

## 2023-02-07 ENCOUNTER — TELEPHONE (OUTPATIENT)
Dept: ORTHOPEDIC SURGERY | Age: 68
End: 2023-02-07

## 2023-02-07 VITALS
SYSTOLIC BLOOD PRESSURE: 170 MMHG | RESPIRATION RATE: 16 BRPM | DIASTOLIC BLOOD PRESSURE: 88 MMHG | TEMPERATURE: 97.9 F | HEART RATE: 81 BPM | OXYGEN SATURATION: 98 %

## 2023-02-07 ASSESSMENT — ENCOUNTER SYMPTOMS: PAIN LOCATION - PAIN QUALITY: ACHE

## 2023-02-07 NOTE — TELEPHONE ENCOUNTER
She is having sciatica pain on the left side. Her surgery was on the right. She wants to know if he can refer her to see the back doctor or what he suggests? Please let her know.

## 2023-02-09 ENCOUNTER — HOME CARE VISIT (OUTPATIENT)
Dept: SCHEDULING | Facility: HOME HEALTH | Age: 68
End: 2023-02-09
Payer: MEDICARE

## 2023-02-09 VITALS
DIASTOLIC BLOOD PRESSURE: 86 MMHG | HEART RATE: 78 BPM | RESPIRATION RATE: 16 BRPM | OXYGEN SATURATION: 96 % | SYSTOLIC BLOOD PRESSURE: 152 MMHG | TEMPERATURE: 98.7 F

## 2023-02-09 PROCEDURE — G0151 HHCP-SERV OF PT,EA 15 MIN: HCPCS

## 2023-02-09 ASSESSMENT — ENCOUNTER SYMPTOMS: PAIN LOCATION - PAIN QUALITY: ACHE

## 2023-02-13 ENCOUNTER — HOSPITAL ENCOUNTER (OUTPATIENT)
Dept: PHYSICAL THERAPY | Age: 68
Setting detail: RECURRING SERIES
Discharge: HOME | End: 2023-02-16
Payer: MEDICARE

## 2023-02-13 PROCEDURE — 97110 THERAPEUTIC EXERCISES: CPT

## 2023-02-13 PROCEDURE — 97161 PT EVAL LOW COMPLEX 20 MIN: CPT

## 2023-02-13 PROCEDURE — 97140 MANUAL THERAPY 1/> REGIONS: CPT

## 2023-02-13 ASSESSMENT — PAIN SCALES - GENERAL: PAINLEVEL_OUTOF10: 7

## 2023-02-13 ASSESSMENT — PAIN DESCRIPTION - LOCATION: LOCATION: BACK;KNEE

## 2023-02-13 NOTE — THERAPY EVALUATION
Sondra Heriberto  : 1955  Primary: [facility] (Medicare Managed)  Secondary:  [address] [address] @ [address] [place], [place] [address]  Phone: [phone]  Fax: [phone] Plan Frequency: 2-3 visit per week for 8 weeks    Plan of Care/Certification Expiration Date: 04/10/23 (Start of [address] 2023)      PT Visit Info:  Plan Frequency: 2-3 visit per week for 8 weeks  Plan of Care/Certification Expiration Date: 04/10/23 (Start of POC 2023)      Visit Count:  1                OUTPATIENT PHYSICAL THERAPY:             OP NOTE TYPE: Initial Assessment 2023               Episode (s/p Right TKA) Appt Desk         Treatment Diagnosis: Aftercare following joint replacement surgery (Z47.1)  Presence of artificial right knee joint (Z96.651)  Right knee pain (M25.561)  Difficulty walking, not else where classified (R26.2)  Medical/Referring Diagnosis:  S/P total knee arthroplasty, right [L94.463]  Referring Physician:  JENNIFER Ellis MD Orders:  PT Eval and Treat  Return MD Appt:  2023  Date of Onset:  Onset Date: 23 (s/p right total knee arthroplasty)      Allergies:  Benazepril and Erythromycin  Restrictions/Precautions:    Restrictions/Precautions: Weight Bearing  Right Lower Extremity Weight Bearing: Weight Bearing As Tolerated  Left Lower Extremity Weight Bearing: Weight Bearing As Tolerated     Medications Last Reviewed:  2023     SUBJECTIVE   History of Injury/Illness (Reason for Referral):  Mrs. Anila Mccormack is a 70 y/o female with complaints of right knee pain and difficulty walking s/p right TKA performed on 2023. Pt reports no complications since surgery and no falls since surgery. No numbness/tingling in BLEs. Per pt she is currently having more difficulty/pain with pt reported left sided low back pain that has worsened in the last 1-2 weeks.  Per pt she has history of low back pain with sciatica and equates her current back pain to this history. Pt completed approximately 3 weeks home health PT  treatment and is now referred for outpatient PT treatment. Pt referred by MD for PT eval and treat. Pt will benefit from skilled PT treatment to address deficits in strength, AROM, balance, gait and functional mobility in order to return to prior level of function and improve quality of life. Patient Stated Goal(s):  \"Get back to walking, reduce knee and low back pain. \"  Initial:   Back, Knee 7/10 Post Session:   Back, Knee 4/10  Past Medical History/Comorbidities:   Ms. Kimberley Garrett  has a past medical history of Allergic rhinitis, Anxiety, Arthritis, Autoimmune hepatitis ([address] Chronic pain syndrome, Depression, Ear problems, Elevated liver enzymes, Facet arthritis of lumbar region, GERD (gastroesophageal reflux disease), Hearing reduced, Hyperlipemia, Hypertension, Iron deficiency anemia, Menopausal disorder, Metabolic syndrome, Osteoporosis, Restrictive lung disease, and Thickened endometrium. Ms. Kimberley Garrett  has a past surgical history that includes orthopedic surgery (Left, 03/13/2014); Cholecystectomy; other surgical history (2001); ERCP (05/2021); Total knee arthroplasty (Left, 9/6/2022); and Total knee arthroplasty (Right, 1/17/2023).   Social History/Living Environment:   Lives With: Family  Type of Home: House  Home Layout: One level  Home Access: Stairs to enter without rails  Entrance Stairs - Rails: Left  Entrance Stairs - Number of Steps: 1  Bathroom Shower/Tub: Walk-in shower  Bathroom Equipment: Grab bars in shower; Built-in shower seat; 3-in-1 commode  Home Equipment: [place]; Walker, rolling     Prior Level of Function/Work/Activity:   Occupation: Retired  Ambulation Assistance: Independent        Learning:   Does the patient/guardian have any barriers to learning?: No barriers  What is the preferred language of the patient/guardian?: English  How does the patient/guardian prefer to learn new concepts?: Listening; Reading; Demonstration; Pictures/Videos     Fall Risk Scale: Lewis Total Score: 15  Lewis Fall Risk: Low (0-24)         OBJECTIVE   Observation/Postural and Gait Assessment: Gait: Mild antalgic pattern with use of rolling walker, forward flexed trunk and decreased gait speed; Incision: normal healing, steri strips intact on distal end of incision, mild redness and warmth of skin observed lateral to surgical incision. Anthropometric measurements (cm) Left Right   Knee joint line 48 cm 49 cm     Palpation: Mild tenderness to palpation noted distal quad, hamstring and gastroc. ROM:     AROM(PROM) Left Right   Knee flexion 118° 100(105)°   Knee extension 0° Lacking 6 (0)°     Passive Accessory Mobility Testing: Patella hypomobility observed all planes, most notable in superior glide direction. Strength:       Manual Muscle Test (out of 5) Left Right   Knee extension 5 4-   Knee flexion 4+ 3+   Hip abduction 4 3+     5 times sit to stand: 28 sec    Special Tests: NT due to post op. Neurological Screen:  Myotomes: Key muscle strength testing for bilateral LE is WNL. Dermatomes: Sensation testing through bilateral LE for light touch: numbness lateral right TKA incision at distal third. Reflexes: Patellar (L4) and achilles (S1) are NT and NT. Functional Mobility:  Sit to stand: No BUE assist, mild increased trunk flexion and left sided weight shift observed during sit to stand transition. Supine to sit: Independent, poor understanding of log roll technique. ASSESSMENT   Initial Assessment:  Mrs. Joey Shook is a 68 y/o female with complaints of right knee pain and difficulty walking s/p right TKA performed on 1/17/2023. Pt reports no complications since surgery and no falls since surgery. No numbness/tingling in BLEs. Per pt she is currently having more difficulty/pain with pt reported left sided low back pain that has worsened in the last 1-2 weeks.  Per pt she has history of low back pain with sciatica and equates her current back pain to this history. Pt completed approximately 3 weeks home health PT  treatment and is now referred for outpatient PT treatment. Pt referred by MD for PT eval and treat. Pt will benefit from skilled PT treatment to address deficits in strength, AROM, balance, gait and functional mobility in order to return to prior level of function and improve quality of life. Problem List: (Impacting functional limitations): Body Structures, Functions, Activity Limitations Requiring Skilled Therapeutic Intervention: Decreased functional mobility ; Decreased ADL status; Decreased ROM; Decreased body mechanics; Decreased strength; Decreased endurance; Decreased balance; Decreased high-level IADLs; Decreased coordination; Increased pain; Decreased posture     Therapy Prognosis:   Therapy Prognosis: Good     Initial Assessment Complexity:   Decision Making: Low Complexity    PLAN   Effective Dates: 2/13/2023 TO Plan of Care/Certification Expiration Date: 04/10/23 (Start of POC 2/13/2023)     Frequency/Duration: Plan Frequency: 2-3 visit per week for 8 weeks     Interventions Planned (Treatment may consist of any combination of the following):    Current Treatment Recommendations: Strengthening; ROM; Balance training; Functional mobility training; Transfer training; ADL/Self-care training; IADL training; Endurance training; Gait training; Stair training; Neuromuscular re-education; Manual; Pain management; Home exercise program; Safety education & training; Patient/Caregiver education & training; Equipment evaluation, education, & procurement; Modalities; Dry needling;  Therapeutic activities     Goals: (Goals have been discussed and agreed upon with patient.)  Short-Term Functional Goals: Time Frame: 2/13/2023 to 3/13/2023   Patient demonstrates independence with home exercise program without verbal cueing provided by therapist.  Pt will report worst pain 3/10 or less at rest in order to return to unrestricted prolonged sitting 30 mins or greater. Pt will report unrestricted sleeping through night 75% of week in order to progress towards sleeping habits consistent with prior level of function. Pt will demonstrate independent supine to sit transfer using log roll technique in order to improve independence and safety with functional transfers. Pt will demonstrate right knee PROM 0-115 degrees or greater in order to progress towards independent functional transfers including sit to stand. Discharge Goals: Time Frame: 2/13/2023 to 4/10/2023  Pt will report worst pain 3/10 or less with prolonged standing/walking 15 mins or greater in order to return to unrestricted community distance ambulation. Pt will demonstrate right knee AROM 0-120 degrees or greater in order to return to independent stair navigation. Pt will demonstrate 5 times sit to stand test time of 18 sec or less in order to demonstrate gross functional strength improvement. KOOS score of 5/28 or less in order to demonstrate overall functional improvement. Outcome Measure: Tool Used: Knee injury and Osteoarthritis Outcome Score for Joint Replacement (KOOS, JR)  Score:  Initial:  9/28 Most Recent: TBD   Interpretation of Score: The KOOS, JR contains 7 items from the original KOOS survey. Items are coded from 0 to 4, none to extreme respectively. Tor Risen is scored by summing the raw response (range 0-28) and then converting it to an interval score using the table provided below. The interval score ranges from 0 to 100 where 0 represents total knee disability and 100 represents perfect knee health. Medical Necessity:   > Patient is expected to demonstrate progress in strength, range of motion, balance, coordination, and functional technique to increase independence with community distance ambulation, functional transfers, ADL performance and light house work activities.   > Skilled intervention continues to be required due to decreased AROM, decreased strength, decreased balance, decreased gait, decreased functional mobility. Reason For Services/Other Comments:  > Patient continues to require skilled intervention due to increasing complexity of exercise. .  Total Duration:  Time In: 1107  Time Out: 8476    Regarding Anjana Luke's therapy, I certify that the treatment plan above will be carried out by a therapist or under their direction.   Thank you for this referral,  Cristopher Ferrell, PT     Referring Physician Signature: JENNIFER Ace _______________________________ Date _____________        Post Session Pain  Charge Capture  PT Visit Info MD Guidelines  MyChart

## 2023-02-13 NOTE — PROGRESS NOTES
Tereso Jerel  : 1955  Primary: [facility] (Medicare Managed)  [facility]  [address] @ [address] [place], [place] [address]  Phone: [phone]  Fax: [phone] Plan Frequency: 2-3 visit per week for 8 weeks    Plan of Care/Certification Expiration Date: 04/10/23 (Start of [address] 2023)      PT Visit Info:  Plan Frequency: 2-3 visit per week for 8 weeks  Plan of Care/Certification Expiration Date: 04/10/23 (Start of [address] 2023)      Visit Count:  1    OUTPATIENT PHYSICAL THERAPY:OP NOTE TYPE: Treatment Note 2023       Episode  }Appt Desk             Treatment Diagnosis:  Aftercare following joint replacement surgery (Z47.1)  Presence of artificial right knee joint (Z96.651)  Right knee pain (M25.561)  Difficulty walking, not else where classified (R26.2)  Medical/Referring Diagnosis:  S/P total knee arthroplasty, right [H90.399]  Referring Physician:  JENNIFER Jaramillo MD Orders:  PT Eval and Treat  Date of Onset:  Onset Date: 23 (s/p right total knee arthroplasty)     Allergies:   Benazepril and Erythromycin  Restrictions/Precautions:  Restrictions/Precautions: Weight Bearing  Right Lower Extremity Weight Bearing: Weight Bearing As Tolerated  Left Lower Extremity Weight Bearing: Weight Bearing As Tolerated     Interventions Planned (Treatment may consist of any combination of the following):    Current Treatment Recommendations: Strengthening; ROM; Balance training; Functional mobility training; Transfer training; ADL/Self-care training; IADL training; Endurance training; Gait training; Stair training; Neuromuscular re-education; Manual; Pain management; Home exercise program; Safety education & training; Patient/Caregiver education & training; Equipment evaluation, education, & procurement; Modalities; Dry needling;  Therapeutic activities     Subjective Comments:  Pt reports left sided low back pain is worse that right knee pain. Per pt has been having some trouble performing her HEP exercises due to increased back pain. Initial:}  Back, Knee 7/10Post Session:     Back, Knee 4/10  Medications Last Reviewed:  2/13/2023  Updated Objective Findings:  See evaluation note from today  Treatment   THERAPEUTIC EXERCISE: (30 minutes):    Exercises per grid below to improve mobility, strength, and coordination. Required moderate visual, verbal, manual, and tactile cues to promote proper body alignment, promote proper body poster and proper body mechanics. Progressed resistance, range, repetitions and complexity of movement as indicated. Date:  2/13/2023 Date:   Date:     Activity/Exercise Parameters Parameters Parameters   Quad set [id number]     Heel prop 5 mins     Heel slide Supine with strap, x20     SLR 2x10     Hamstring stretch Strap, [id number]     Gastroc stretch Strap, [id number]     SKTC 10x5 sec bilateral     Sciatic nerve glide X10 LLE                 PROM Manual PROM, 5 mins flexion/extension. [employer] access code: [email or web address]    Time spent with patient reviewing proper muscle recruitment and technique with exercises. MANUAL THERAPY: (10 minutes):   Joint mobilization and Soft tissue mobilization was utilized and necessary because of the patient's restricted joint motion, painful spasm, loss of articular motion, and restricted motion of soft tissue. - Joint mobs: Patellar mobs all planes grade II/III. - Soft tissue mobilization: Distal quad, hamstring, gastroc, scar mobs. MODALITIES: (0 minutes):        Pt declined ice stating she will ice at home. HEP: As above; handouts given to patient for all exercises. Treatment/Session Summary:    Treatment Assessment:  Excellent tolerance to manual and therex interventions observed; good understanding of HEP demonstrated. Communication/Consultation:   HEP handout provided.   Equipment provided today:  None  Recommendations/Intent for next treatment session: Next visit will focus on pain/swelling control, improve right knee ROM, improve RLE strength, progress gait/balance/functional mobility to tolerance. Total Treatment Billable Duration:  55 minutes, 15 mins eval, 30 mins therex, 10 min manual therapy.   Time In: 1107  Time Out: [address] [facility], PT       Charge Capture  }Post Session Pain  PT Visit Info  [employer] Portal  MD Guidelines  Scanned Media  Benefits  MyChart    Future Appointments   Date Time Provider [place]   2/20/2023  1:10 PM Luciana Gaviria MD [facility]   2/21/2023 10:30 AM Hanny Guan, APRN - CNP [facility]

## 2023-02-16 ENCOUNTER — HOSPITAL ENCOUNTER (OUTPATIENT)
Dept: PHYSICAL THERAPY | Age: 68
Setting detail: RECURRING SERIES
Discharge: HOME | End: 2023-02-19
Payer: MEDICARE

## 2023-02-16 PROCEDURE — 97140 MANUAL THERAPY 1/> REGIONS: CPT

## 2023-02-16 PROCEDURE — 97110 THERAPEUTIC EXERCISES: CPT

## 2023-02-16 ASSESSMENT — PAIN DESCRIPTION - LOCATION: LOCATION: KNEE;BACK

## 2023-02-16 ASSESSMENT — PAIN SCALES - GENERAL: PAINLEVEL_OUTOF10: 2

## 2023-02-16 NOTE — PROGRESS NOTES
Raudel Galvez  : 1955  Primary: [facility] (Medicare Managed)  Secondary:  [address] @ [address] [place], [place] [address]  Phone: [phone]  Fax: [phone] Plan Frequency: 2-3 visit per week for 8 weeks    Plan of Care/Certification Expiration Date: 04/10/23 (Start of  [address]23)      PT Visit Info:  Plan Frequency: 2-3 visit per week for 8 weeks  Plan of Care/Certification Expiration Date: 04/10/23 (Start of  [address] 2023)      Visit Count:  2    OUTPATIENT PHYSICAL THERAPY:OP NOTE TYPE: Treatment Note 2023       Episode  }Appt Desk             Treatment Diagnosis:  Aftercare following joint replacement surgery (Z47.1)  Presence of artificial right knee joint (Z96.651)  Right knee pain (M25.561)  Difficulty walking, not else where classified (R26.2)  Medical/Referring Diagnosis:  S/P total knee arthroplasty, right [K91.556]  Referring Physician:  JENNIFER Sabillon MD Orders:  PT Eval and Treat  Date of Onset:  Onset Date: 23 (s/p right total knee arthroplasty)     Allergies:   Benazepril and Erythromycin  Restrictions/Precautions:  Restrictions/Precautions: Weight Bearing  Right Lower Extremity Weight Bearing: Weight Bearing As Tolerated  Left Lower Extremity Weight Bearing: Weight Bearing As Tolerated     Interventions Planned (Treatment may consist of any combination of the following):    Current Treatment Recommendations: Strengthening; ROM; Balance training; Functional mobility training; Transfer training; ADL/Self-care training; IADL training; Endurance training; Gait training; Stair training; Neuromuscular re-education; Manual; Pain management; Home exercise program; Safety education & training; Patient/Caregiver education & training; Equipment evaluation, education, & procurement; Modalities; Dry needling; Therapeutic activities     Subjective Comments:   Patient reports posterior R knee pain.  She is also experiencing chronic back pain. Initial:}  Knee, Back 2/10Post Session:     Knee, Back  /10  Medications Last Reviewed:  2/16/2023  Updated Objective Findings:   R Knee ROM: flexion 103°, extension -2°  Treatment   THERAPEUTIC EXERCISE: (45 minutes):    Exercises per grid below to improve mobility, strength, and coordination. Required moderate visual, verbal, manual, and tactile cues to promote proper body alignment, promote proper body poster and proper body mechanics. Progressed resistance, range, repetitions and complexity of movement as indicated. Date:  2/13/2023 Date:  2/16/23 Date:     Activity/Exercise Parameters Parameters Parameters   NuStep  10 minutes, level 3 for cardiovascular and ROM    Quad set [id number] 2x10, 5 seconds     Heel prop 5 mins 10 minutes     Ankle Circles  20x CW and CCW; Lacrosse ball under calf for myofascial release    Heel slide Supine with strap, x20 Supine with strap, x20    SLR 2x10     Hamstring stretch Strap, [id number] Strap, [id number]    Gastroc stretch Strap, [id number]     SKTC 10x5 sec bilateral     Sciatic nerve glide X10 LLE                 PROM Manual PROM, 5 mins flexion/extension. --    [employer] access code: [email or web address]    Time spent with patient reviewing proper muscle recruitment and technique with exercises. MANUAL THERAPY: (10 minutes):   Joint mobilization and Soft tissue mobilization was utilized and necessary because of the patient's restricted joint motion, painful spasm, loss of articular motion, and restricted motion of soft tissue. - Joint mobs: Patellar mobs all planes grade II/III. - Soft tissue mobilization: Distal quad, hamstring, gastroc/soleus, scar mobs. MODALITIES: (0 minutes):        Pt declined ice stating she will ice at home. HEP: As above; handouts given to patient for all exercises.       Treatment/Session Summary:    Treatment Assessment:   Patient's incision has signs of scarring and is healing appropriately; mild adhesions are noted. Posterior gastroc/soleus soft tissue trigger point and increased tone detected. Patient continues to make graded improvements in ROM and mobility. Communication/Consultation:   HEP handout provided. Equipment provided today:  None  Recommendations/Intent for next treatment session: Next visit will focus on pain/swelling control, improve right knee ROM, improve RLE strength, progress gait/balance/functional mobility to tolerance.     Total Treatment Billable Duration:  55 minutes  Time In: 1330  Time Out: [address] [facility], PT       Charge Capture  }Post Session Pain  PT Visit [address] Portal  MD Guidelines  Scanned Media  Benefits  MyChart    Future Appointments   Date Time Provider [place]   2/20/2023  1:10 PM Haritha Rodarte MD [facility]   2/21/2023 10:30 AM FLY Guo - CNP [facility]   2/22/2023 12:30 PM Misbah Lapine, PT [facility]   2/24/2023 12:30 PM Darilyn Opoka, PT [facility]   2/27/2023 12:30 PM Misbah Lapine, PT [facility]   3/1/2023 12:30 PM Darilyn Opoka, PT [facility]   3/6/2023 12:30 PM Misbah Lapine, PT [facility]   3/8/2023  1:30 PM Darilyn Opoka, PT [facility]   3/13/2023 12:30 PM Misbah Lapine, PT [facility]   3/15/2023 12:30 PM Misbah Lapine, PT [facility]   3/20/2023 12:30 PM Misbah Lapine, PT [facility]   3/23/2023 12:30 PM Misbah Lapine, PT [facility]   3/27/2023 12:30 PM Darilyn Opoka, PT [facility]   3/29/2023  1:30 PM Darilyn Opoka, PT [facility]   4/3/2023 12:30 PM Misbah Lapine, PT [facility]   4/5/2023 12:30 PM Misbah Lapine, PT [facility]

## 2023-02-20 ENCOUNTER — OFFICE VISIT (OUTPATIENT)
Dept: ORTHOPEDIC SURGERY | Age: 68
End: 2023-02-20

## 2023-02-20 DIAGNOSIS — M17.11 PRIMARY OSTEOARTHRITIS OF RIGHT KNEE: Primary | ICD-10-CM

## 2023-02-20 DIAGNOSIS — Z09 FOLLOW-UP EXAMINATION: ICD-10-CM

## 2023-02-20 DIAGNOSIS — Z96.651 HISTORY OF TOTAL KNEE ARTHROPLASTY, RIGHT: ICD-10-CM

## 2023-02-20 DIAGNOSIS — Z96.651 HISTORY OF TOTAL KNEE ARTHROPLASTY, RIGHT: Primary | ICD-10-CM

## 2023-02-20 PROCEDURE — 99024 POSTOP FOLLOW-UP VISIT: CPT | Performed by: PHYSICIAN ASSISTANT

## 2023-02-20 RX ORDER — HYDROCODONE BITARTRATE AND ACETAMINOPHEN 7.5; 325 MG/1; MG/1
1-2 TABLET ORAL EVERY 4 HOURS PRN
Qty: 60 TABLET | Refills: 0 | Status: SHIPPED | OUTPATIENT
Start: 2023-02-20 | End: 2023-02-27

## 2023-02-20 NOTE — PROGRESS NOTES
Name: Mendel Bello  YOB: 1955  Gender: female  MRN: [id number]    RIGHT Post-Op TKA 4 week follow up    This patient returns now four week status post s/p TKA. Things have gone reasonably well with therapy and the patient is now weaning from the cane. The pain level has improved. There has been no significant problem since the patient was last seen. On exam, the incision is healing approriately. She does have an area of erythema over the lateral area of the right knee. The remainder of the incision does appear to be healing appropriately and there is no other area of concern regarding erythema or other discoloration. ROM is 3 to 110. The patient has minimal antalgia in stance and good alignment in stance. Radiographs are obtained. Standing AP, flexion lateral and sunrise views of the RIGHT knee demonstrates well positioned TKA with good bone implant interfaces. No significant abnormalities are seen. RADIOGRAPHIC IMPRESSION: Stable RIGHT TKA. CLINICAL IMPRESSION AND PLAN: Now four weeks s/p TKA . The patient is doing reasonably well. I have made recommendations about activity. We will ask the patient to continue to wean from the cane. Recommendations for further therapy include OUTPATIENT THERAPY FOR 3-4 WEEKS. The patient will follow back up in 3 weeks for skin recheck. She will let us know if her symptoms change at all or she starts to develop a fever. Otherwise see her back as dictated.     Work/Activity Restrictions: NOT APPLICABLE    JENNIFER Gillis

## 2023-02-21 ENCOUNTER — OFFICE VISIT (OUTPATIENT)
Dept: ORTHOPEDIC SURGERY | Age: 68
End: 2023-02-21
Payer: MEDICARE

## 2023-02-21 VITALS — HEIGHT: 62 IN | BODY MASS INDEX: 40.3 KG/M2 | WEIGHT: 219 LBS

## 2023-02-21 DIAGNOSIS — M47.816 LUMBAR FACET ARTHROPATHY: Primary | ICD-10-CM

## 2023-02-21 DIAGNOSIS — M41.27 OTHER IDIOPATHIC SCOLIOSIS, LUMBOSACRAL REGION: ICD-10-CM

## 2023-02-21 DIAGNOSIS — M54.50 LOW BACK PAIN, UNSPECIFIED BACK PAIN LATERALITY, UNSPECIFIED CHRONICITY, UNSPECIFIED WHETHER SCIATICA PRESENT: Primary | ICD-10-CM

## 2023-02-21 DIAGNOSIS — M51.36 LUMBAR DEGENERATIVE DISC DISEASE: ICD-10-CM

## 2023-02-21 DIAGNOSIS — M47.816 LUMBAR FACET ARTHROPATHY: ICD-10-CM

## 2023-02-21 PROCEDURE — 1090F PRES/ABSN URINE INCON ASSESS: CPT | Performed by: NURSE PRACTITIONER

## 2023-02-21 PROCEDURE — 3017F COLORECTAL CA SCREEN DOC REV: CPT | Performed by: NURSE PRACTITIONER

## 2023-02-21 PROCEDURE — 1036F TOBACCO NON-USER: CPT | Performed by: NURSE PRACTITIONER

## 2023-02-21 PROCEDURE — G8399 PT W/DXA RESULTS DOCUMENT: HCPCS | Performed by: NURSE PRACTITIONER

## 2023-02-21 PROCEDURE — G8417 CALC BMI ABV UP PARAM F/U: HCPCS | Performed by: NURSE PRACTITIONER

## 2023-02-21 PROCEDURE — 99203 OFFICE O/P NEW LOW 30 MIN: CPT | Performed by: NURSE PRACTITIONER

## 2023-02-21 PROCEDURE — G8427 DOCREV CUR MEDS BY ELIG CLIN: HCPCS | Performed by: NURSE PRACTITIONER

## 2023-02-21 PROCEDURE — 1123F ACP DISCUSS/DSCN MKR DOCD: CPT | Performed by: NURSE PRACTITIONER

## 2023-02-21 PROCEDURE — G8484 FLU IMMUNIZE NO ADMIN: HCPCS | Performed by: NURSE PRACTITIONER

## 2023-02-21 RX ORDER — TRAMADOL HYDROCHLORIDE 50 MG/1
TABLET ORAL
COMMUNITY
Start: 2022-12-29

## 2023-02-21 NOTE — PROGRESS NOTES
Name: Ayana Valdez  YOB: 1955  Gender: female  MRN: [id number]    CC: Left lower back and buttock pain    HPI: This is a 69y.o. year old female who has had an intermittent 3-year history of left lower back pain rating into the buttock. It has gotten worse over the past 6 months. Patient had her left knee replaced in September 2022. She had back pain prior to that. It can sometimes radiate down her leg but primarily the left lower back at L5 with hip and buttock complaints. She recently had her right knee replaced 2 weeks ago. She is having difficulty doing therapy due to her left lower back pain complaints. Current treatment has included physical therapy in September and October 2022 that did focus on her lumbar spine as well as her knee, gabapentin, Robaxin, hydrocodone, Tylenol. This patient  has not had lumbar surgery in the past.       Current pain level: 9  Activities limited by pain: Sitting, standing, physical therapy for her knee, daily activities       AMB PAIN ASSESSMENT 2/21/2023   Location of Pain Back   Location Modifiers Left   Severity of Pain 10   Quality of Pain Sharp; Aching   Duration of Pain Persistent   Frequency of Pain Constant   Aggravating Factors Walking   Limiting Behavior Yes   Relieving Factors Ice   Result of Injury No   Work-Related Injury No   Are there other pain locations you wish to document? No            ROS/Meds/PSH/PMH/FH/SH: I personally reviewed the patient's collected intake data. Below are the pertinents:    Allergies   Allergen Reactions    Benazepril Other (See Comments)    Erythromycin Other (See Comments)     Other reaction(s): Abdominal pain-I  Other reaction(s): Abdominal pain-I  GI upset         Current Outpatient Medications:     HYDROcodone-acetaminophen (NORCO) 7.5-325 MG per tablet, Take 1-2 tablets by mouth every 4 hours as needed for Pain for up to 7 days.  Max Daily Amount: 12 tablets, Disp: 60 tablet, Rfl: 0    methocarbamol (ROBAXIN-750) 750 MG tablet, Take 1 tablet by mouth every 6 hours as needed for spasms. , Disp: 40 tablet, Rfl: 0    methocarbamol (ROBAXIN) 750 MG tablet, Take 1 tablet by mouth 4 times daily as needed (muscle spasms), Disp: 40 tablet, Rfl: 0    promethazine (PHENERGAN) 25 MG tablet, Take 1 tablet by mouth every 6 hours as needed for Nausea, Disp: 40 tablet, Rfl: 0    docusate sodium (COLACE) 100 MG capsule, Take 100 mg by mouth 2 times daily. , Disp: , Rfl:     Ferrous Sulfate Dried (FERROUS SULFATE IRON PO), Take 1 tablet by mouth daily. 65 mg, Disp: , Rfl:     Calcium Citrate-Vitamin D (CALCIUM CITRATE + D3 PO), Take 1 tablet by mouth daily. , Disp: , Rfl:     Ascorbic Acid (VITAMIN C) 500 MG CAPS, Take 1 tablet by mouth daily. , Disp: , Rfl:     acetaminophen (TYLENOL) 500 MG tablet, Take 1,000 mg by mouth every 6 hours as needed (breakthrough pain), Disp: , Rfl:     LORazepam (ATIVAN) 1 MG tablet, Take 1 mg by mouth 3 times daily as needed for Anxiety. , Disp: , Rfl:     linaclotide (LINZESS) 145 MCG capsule, Take 145 mcg by mouth every morning (before breakfast), Disp: , Rfl:     albuterol sulfate  (90 Base) MCG/ACT inhaler, Inhale 1 puff into the lungs every 4 hours as needed for Wheezing, Disp: , Rfl:     amLODIPine (NORVASC) 10 MG tablet, Take 10 mg by mouth daily, Disp: , Rfl:     carvedilol (COREG) 6.25 MG tablet, Take 6.25 mg by mouth 2 times daily (with meals), Disp: , Rfl:     DULoxetine (CYMBALTA) 60 MG extended release capsule, Take 60 mg by mouth 2 times daily, Disp: , Rfl:     fluticasone (FLONASE) 50 MCG/ACT nasal spray, 1 to 2 sprays each nostril daily, Disp: , Rfl:     gabapentin (NEURONTIN) 100 MG capsule, Take 200 mg by mouth at bedtime. , Disp: , Rfl:     hydroCHLOROthiazide (HYDRODIURIL) 25 MG tablet, Take 25 mg by mouth daily, Disp: , Rfl:     potassium chloride (KLOR-CON) 10 MEQ extended release tablet, Take 10 mEq by mouth 2 times daily, Disp: , Rfl:     QUEtiapine (SEROQUEL) 25 MG tablet, Take 100 mg by mouth at bedtime, Disp: , Rfl:     rosuvastatin (CRESTOR) 20 MG tablet, Take 10 mg by mouth at bedtime, Disp: , Rfl:     traMADol (ULTRAM) 50 MG tablet, , Disp: , Rfl:     aspirin 81 MG EC tablet, Take 1 tablet by mouth 2 times daily, Disp: 60 tablet, Rfl: 0    celecoxib (CELEBREX) 200 MG capsule, Take 1 capsule by mouth daily, Disp: 30 capsule, Rfl: 0    Past Surgical History:   Procedure Laterality Date    CHOLECYSTECTOMY      ERCP  05/2021    ORTHOPEDIC SURGERY Left 03/13/2014    hip fracture and sugery- fell at work    [id number]  2001    sphincterotomy- Dr. Kelle Frankel Left 9/6/2022    ROBOTIC ASSISTED LEFT KNEE TOTAL ARTHROPLASTY performed by Pee Hou MD at [address] [place] [address] Right 1/17/2023    KNEE TOTAL ARTHROPLASTY ROBOTIC-RIGHT VELYS performed by Pee Hou MD at [facility]       Patient Active Problem List   Diagnosis    Hypertension    Recurrent major depressive disorder, in partial remission (HCC)    Arthralgia of hip    Osteoporosis    Hyperlipemia    Unilateral primary osteoarthritis, left knee    Suspected sleep apnea    Arthritis of left knee    Osteoarthritis of right knee    Arthritis of right knee         Tobacco:  reports that she has never smoked. She has never used smokeless tobacco.  Alcohol:   Social History     Substance and Sexual Activity   Alcohol Use No        Physical Exam:   Body mass index is 40.06 kg/m². GENERAL:  Adult in no acute distress, moderately obese Patient is appropriately conversant  MSK:  Examination of the lumbar spine reveals paraspinal tenderness , facet tenderness   There is severe tenderness to palpation along the spinous processes and paraspinal musculature. The patient ambulates with a unsteady, forward flexed, and assisted with a cane gait. ROM of bilateral hip(s) reveals no irritability. NEURO:  Cranial nerves grossly intact. No motor deficits. Straight leg testing is positive left  Sensory testing reveals intact sensation to light touch and in the distribution of the L3-S1 dermatomes bilaterally  Ankle jerk is negative for clonus    Reflexes   Right Left   Quadriceps (L4) 2 2   Achilles (S1) 2 2     Strength testing in the lower extremity reveals the following based on the 5 point grading scale:     HF (L2) H Ab (L5) KE (L3/4) ADF (L4) EHL (L5) A Ev (S1) APF (S1)   Right 5 5 5 5 5 5 5   Left 5 5 5 5 5 5 5     PSYCH:  Alert and oriented X 3. Appropriate affect. Intact judgment and insight. Radiographic Studies:     AP, lateral and spot views of the lumbar spine:      Patient has scoliosis noted. There is multilevel disc degeneration throughout along with spondylotic osteophyte formation. There is a ORIF of the left femur noted. Quality is osteopenic. There is also a fibroid noted in the uterus, patient states she is aware of this. Interpretation: High-level degenerative disc disease and spondylosis      Assessment/Plan:       Diagnosis Orders   1. Low back pain, unspecified back pain laterality, unspecified chronicity, unspecified whether sciatica present  XR LUMBAR SPINE (2-3 VIEWS)      2. Lumbar facet arthropathy        3. Lumbar degenerative disc disease        4. Other idiopathic scoliosis, lumbosacral region            This patient's clinical history and physical exam is consistent with radicular, spondylitic , and facetogenic back pain. He also has some radiculopathy complaints. She is got multilevel degenerative disc disease and spondylosis along with scoliosis. Unfortunately she is exhausted conservative treatment and her back pain is inhibiting her from being able to rehabilitate from her total knee arthroplasty. I would recommend proceeding with an MRI of the lumbar spine then we discussed injection management to make her comfortable so she is able to perform the rehab for her right knee.         I discussed with her the natural history of this condition in that most episodes are typically self limited.  However, the symptoms can last for several months if not even longer. What I currently recommend is that she continues with conservative treatments to help cope with the symptoms and avoid having back surgery at this time. She understands that conservative treatments typically include activity modification, NSAIDs and physical therapy.  Oral and/or epidural steroids could be considered in resistant scenarios. Also, she  may want to explore chiropractic care or acupuncture. I advised to avoid any prolonged bedrest and to try to maintain ADLs as much as possible. The patient was counseled to follow up me should she  develop any neurologic symptoms such as leg pain.      - A MRI was ordered to delineate anatomy, confirm the diagnosis and assess the severity.      No orders of the defined types were placed in this encounter.       Orders Placed This Encounter   Procedures    XR LUMBAR SPINE (2-3 VIEWS)        3 This is an acute, uncomplicated illness/injury      Return for MRI results.     FLY Murphy - CNP  02/21/23      Elements of this note were created using speech recognition software.  As such, errors of speech recognition may be present.

## 2023-02-23 ENCOUNTER — HOSPITAL ENCOUNTER (OUTPATIENT)
Dept: PHYSICAL THERAPY | Age: 68
Setting detail: RECURRING SERIES
Discharge: HOME | End: 2023-02-26
Payer: MEDICARE

## 2023-02-23 PROCEDURE — 97110 THERAPEUTIC EXERCISES: CPT

## 2023-02-23 ASSESSMENT — PAIN SCALES - GENERAL: PAINLEVEL_OUTOF10: 4

## 2023-02-23 NOTE — PROGRESS NOTES
Troy Fernández  : 1955  Primary: [employer] [facility] [employer] (Medicare Managed)  Secondary:  [address] @ [address] [place] [address]  Phone: [phone]  Fax: [phone] Plan Frequency: 2-3 visit per week for 8 weeks    Plan of Care/Certification Expiration Date: 04/10/23 (Start of  [address] 2023)      PT Visit Info:  Plan Frequency: 2-3 visit per week for 8 weeks  Plan of Care/Certification Expiration Date: 04/10/23 (Start of  [address] 2023)      Visit Count:  3    OUTPATIENT PHYSICAL THERAPY:OP NOTE TYPE: Treatment Note 2023       Episode  }Appt Desk             Treatment Diagnosis:  Aftercare following joint replacement surgery (Z47.1)  Presence of artificial right knee joint (Z96.651)  Right knee pain (M25.561)  Difficulty walking, not else where classified (R26.2)  Medical/Referring Diagnosis:  S/P total knee arthroplasty, right [X90.188]  Referring Physician:  JENNIFER Shelby MD Orders:  PT Eval and Treat  Date of Onset:  Onset Date: 23 (s/p right total knee arthroplasty)     Allergies:   Benazepril and Erythromycin  Restrictions/Precautions:  Restrictions/Precautions: Weight Bearing  Right Lower Extremity Weight Bearing: Weight Bearing As Tolerated  Left Lower Extremity Weight Bearing: Weight Bearing As Tolerated     Interventions Planned (Treatment may consist of any combination of the following):    Current Treatment Recommendations: Strengthening; ROM; Balance training; Functional mobility training; Transfer training; ADL/Self-care training; IADL training; Endurance training; Gait training; Stair training; Neuromuscular re-education; Manual; Pain management; Home exercise program; Safety education & training; Patient/Caregiver education & training; Equipment evaluation, education, & procurement; Modalities; Dry needling; Therapeutic activities     Subjective Comments:  Pt. entered clinic slowly today with cane.   Initial:} 4/10Post Session:       0/10  Medications Last Reviewed:  2/23/2023  Updated Objective Findings:   R Knee ROM: flexion 104°, extension 0°  Treatment   THERAPEUTIC EXERCISE: (55 minutes):    Exercises per grid below to improve mobility, strength, and coordination. Required moderate visual, verbal, manual, and tactile cues to promote proper body alignment, promote proper body poster and proper body mechanics. Progressed resistance, range, repetitions and complexity of movement as indicated. Date:  2/13/2023 Date:  2/16/23 Date:  2/23/23   Activity/Exercise Parameters Parameters Parameters   NuStep  10 minutes, level 3 for cardiovascular and ROM X 10 mins level 3    Quad set [id number] 2x10, 5 seconds  X 20 reps 5 sec hold    Heel prop 5 mins 10 minutes  3 mins quad sets    Ankle Circles  20x CW and CCW; Lacrosse ball under calf for myofascial release -----   Heel slide Supine with strap, x20 Supine with strap, x20 Supine wooden sliding board supine with strap at end range x 20 reps    Hip flexion    At bar x 20 reps    Hip abduction    At bar x 20 reps   Hamstring curls    At bar x 20 reps          SLR 2x10  ------   Hamstring stretch Strap, [id number] Strap, [id number] 4x30 sec hold with strap   Gastroc stretch Strap, [id number]  On incline 4x30 sec hold    SKTC 10x5 sec bilateral  X 10 reps x 5 sec hold bilaterally   Sciatic nerve glide X10 LLE  Seated 2x10 reps    Chair slides    X 20 reps    Sit to stand    2x10 reps    PROM Manual PROM, 5 mins flexion/extension. -- ----   [employer] access code: [email or web address]    Time spent with patient reviewing proper muscle recruitment and technique with exercises. MANUAL THERAPY: (0 minutes): none today  Joint mobilization and Soft tissue mobilization was utilized and necessary because of the patient's restricted joint motion, painful spasm, loss of articular motion, and restricted motion of soft tissue. - Joint mobs: Patellar mobs all planes grade II/III.   - Soft tissue mobilization: Distal quad, hamstring, gastroc/soleus, scar mobs. MODALITIES: (0 minutes):        Pt declined ice stating she will ice at home. HEP: As above; handouts given to patient for all exercises. Treatment/Session Summary:    Treatment Assessment:   Patient continues to show progress with range and strengthening  Communication/Consultation:   HEP handout provided. Equipment provided today:  None  Recommendations/Intent for next treatment session: Next visit will focus on pain/swelling control, improve right knee ROM, improve RLE strength, progress gait/balance/functional mobility to tolerance.     Total Treatment Billable Duration:  55 minutes  Time In: 0171  Time Out: 1625    JESSY MARES, KARON       Charge Capture  }Post Session Pain  PT Visit Info  [facility] Portal  MD Guidelines  Scanned Media  Benefits  MyChart    Future Appointments   Date Time Provider Catherine Roa   2/24/2023 12:30 PM Gaviota Pollack, PT [facility]   2/27/2023 12:30 PM Nolon Shallow, PT [facility]   2/28/2023  1:00 PM POA [facility] AMB RAD [facility]   3/1/2023 12:30 PM Gaviota Pollack, PT [facility]   3/6/2023 12:30 PM Nolon Shallow, PT [facility]   3/8/2023  1:30 PM Gaviota Pollack, PT [facility]   3/13/2023 12:30 PM Nolon Shallow, PT [facility]   3/14/2023  1:15 PM FLY Sheehan - CNP [facility] AMB   3/15/2023 10:40 AM JENNIFER Villela [facility] AMB   3/15/2023 12:30 PM Nolon Shallow, PT [facility]   3/20/2023 12:30 PM Nolon Shallow, PT [facility]   3/23/2023 12:30 PM Nolon Shallow, PT [facility]   3/27/2023 12:30 PM Gaviota Pollack, PT [facility]   3/29/2023  1:30 PM Gaviota Pollack, PT [facility]   4/3/2023 12:30 PM Nolon Shallow, PT [facility]   4/5/2023 12:30 PM Nolon Shallow, PT [facility]

## 2023-02-24 ENCOUNTER — HOSPITAL ENCOUNTER (OUTPATIENT)
Dept: PHYSICAL THERAPY | Age: 68
Setting detail: RECURRING SERIES
Discharge: HOME | End: 2023-02-27
Payer: MEDICARE

## 2023-02-24 PROCEDURE — 97110 THERAPEUTIC EXERCISES: CPT

## 2023-02-24 PROCEDURE — 97140 MANUAL THERAPY 1/> REGIONS: CPT

## 2023-02-24 ASSESSMENT — PAIN SCALES - GENERAL: PAINLEVEL_OUTOF10: 4

## 2023-02-24 NOTE — PROGRESS NOTES
Silverio Book  : 1955  Primary: [employer] (Medicare Managed)  Secondary:  [address] @ [address] [place], [place] [address]  Phone: [phone]  Fax: [phone] Plan Frequency: 2-3 visit per week for 8 weeks    Plan of Care/Certification Expiration Date: 04/10/23 (Start of [address] 2023)      PT Visit Info:  Plan Frequency: 2-3 visit per week for 8 weeks  Plan of Care/Certification Expiration Date: 04/10/23 (Start of [address] 2023)      Visit Count:  4    OUTPATIENT PHYSICAL THERAPY:OP NOTE TYPE: Treatment Note 2023       Episode  }Appt Desk             Treatment Diagnosis:  Aftercare following joint replacement surgery (Z47.1)  Presence of artificial right knee joint (Z96.651)  Right knee pain (M25.561)  Difficulty walking, not else where classified (R26.2)  Medical/Referring Diagnosis:  S/P total knee arthroplasty, right [C81.417]  Referring Physician:  JENNIFER Senior MD Orders:  PT Eval and Treat  Date of Onset:  Onset Date: 23 (s/p right total knee arthroplasty)     Allergies:   Benazepril and Erythromycin  Restrictions/Precautions:  Restrictions/Precautions: Weight Bearing  Right Lower Extremity Weight Bearing: Weight Bearing As Tolerated  Left Lower Extremity Weight Bearing: Weight Bearing As Tolerated     Interventions Planned (Treatment may consist of any combination of the following):    Current Treatment Recommendations: Strengthening; ROM; Balance training; Functional mobility training; Transfer training; ADL/Self-care training; IADL training; Endurance training; Gait training; Stair training; Neuromuscular re-education; Manual; Pain management; Home exercise program; Safety education & training; Patient/Caregiver education & training; Equipment evaluation, education, & procurement; Modalities; Dry needling;  Therapeutic activities     Subjective Comments:   Patient reports that her back is still bothering her and she is scheduled for an MRI on her back next week. Initial:}    4/10Post Session:       0/10  Medications Last Reviewed:  2/24/2023  Updated Objective Findings:   AROM:  Pre - 5 to 105 degrees Pre  Treatment   THERAPEUTIC EXERCISE: (30 minutes):    Exercises per grid below to improve mobility, strength, and coordination. Required moderate visual, verbal, manual, and tactile cues to promote proper body alignment, promote proper body poster and proper body mechanics. Progressed resistance, range, repetitions and complexity of movement as indicated. Date:  2/24/2023 Date:  2/16/23 Date:  2/23/23   Activity/Exercise Parameters Parameters Parameters   NuStep 10 minutes Level 1 Working on ROM 10 minutes, level 3 for cardiovascular and ROM X 10 mins level 3    Quad set 10 sec x 20 Seated 2x10, 5 seconds  X 20 reps 5 sec hold    Heel prop 10 minutes Supine on Wedge, Pillow/Towel Under Calf/Heel 10 minutes  3 mins quad sets    Ankle Circles  20x CW and CCW; Lacrosse ball under calf for myofascial release -----   Heel slide Seated 3 x 10 Supine with strap, x20 Supine wooden sliding board supine with strap at end range x 20 reps    Hip flexion    At bar x 20 reps    Hip abduction    At bar x 20 reps   Hamstring curls    At bar x 20 reps    SLR 3 x 10 Seated  ------   Hamstring stretch  Strap, [id number] 4x30 sec hold with strap   Gastroc stretch Slant Board 3 x 60 sec  On incline 4x30 sec hold    SKTC   X 10 reps x 5 sec hold bilaterally   Sciatic nerve glide   Seated 2x10 reps    Chair slides    X 20 reps    Sit to stand    2x10 reps    PROM Passive Flexion Supine 8 minutes -- ----   [employer] access code: [email or web address]    Time spent with patient reviewing proper muscle recruitment and technique with exercises.     MANUAL THERAPY: (23 minutes):   Joint mobilization and Soft tissue mobilization was utilized and necessary because of the patient's restricted joint motion, painful spasm, loss of articular motion, and restricted motion of soft tissue. - Joint mobs: Patellar mobs all planes grade II/III. - Soft tissue mobilization: Distal quad, hamstring, gastroc/soleus, scar mobs. MODALITIES: (0 minutes):        Pt declined ice stating she will ice at home. HEP: As above; handouts given to patient for all exercises. Treatment/Session Summary:    Treatment Assessment:   Patient continues to demonstrated ROM gains, she was a little stiff today into extension. She is maintain flexion ROM gains. She required verbal cues for posture and form. Her session was modified to avoid back aggravation. She was instructed regarding transfers techniques to avoid aggravation. She would benefit from continued progression per tolerance. Communication/Consultation:   HEP handout provided. Equipment provided today:  None  Recommendations/Intent for next treatment session: Next visit will focus on pain/swelling control, improve right knee ROM, improve RLE strength, progress gait/balance/functional mobility to tolerance.     Total Treatment Billable Duration:  53 minutes  Time In: 0127  Time Out: [id number] Ashok James PT       Charge Capture  }Post Session Pain  PT Visit [facility] Portal  MD Guidelines  Scanned Media  Benefits  MyChart    Future Appointments   Date Time Provider Catherine Roa   2/27/2023 12:30 PM Marybelle Krabbe, PT [facility]   2/28/2023  1:00 PM [facility] RAD [facility]   3/1/2023 12:30 PM Dahiana Cunningham, PT [facility]   3/6/2023 12:30 PM Marybelle Krabbe, PT [facility]   3/8/2023  1:30 PM Dahiana Cunningham, PT [facility]   3/13/2023 12:30 PM Marybelle Krabbe, PT [facility]   3/14/2023  1:15 PM FLY Giordano - CNP [facility] AMB   3/15/2023 10:40 AM JENNIFER Drew [facility]B   3/15/2023 12:30 PM Marybelle Krabbe, PT [facility]   3/20/2023 12:30 PM Marybelle Krabbe, PT [facility]   3/23/2023 12:30 PM Marybelle Krabbe, PT [facility]   3/27/2023 12:30 PM Dahiana Cunningham, PT [facility]   3/29/2023  1:30 PM Angélica Buchanan, PT [facility]   4/3/2023 12:30 PM Shin Johnston, PT [facility]   4/5/2023 12:30 PM Shin Johnston, PT [facility]

## 2023-02-27 ENCOUNTER — APPOINTMENT (OUTPATIENT)
Dept: PHYSICAL THERAPY | Age: 68
End: 2023-02-27
Payer: MEDICARE

## 2023-03-01 ENCOUNTER — HOSPITAL ENCOUNTER (OUTPATIENT)
Dept: PHYSICAL THERAPY | Age: 68
Setting detail: RECURRING SERIES
Discharge: HOME | End: 2023-03-04
Payer: MEDICARE

## 2023-03-01 PROCEDURE — 97110 THERAPEUTIC EXERCISES: CPT

## 2023-03-01 ASSESSMENT — PAIN SCALES - GENERAL: PAINLEVEL_OUTOF10: 2

## 2023-03-01 NOTE — PROGRESS NOTES
Alyssa Loredo  : 1955  Primary: Katerina Flannery [facility] (Medicare Managed)  Secondary:  [address] @ [address] [place], [place] [id number]  Phone: [phone]  Fax: [phone] Plan Frequency: 2-3 visit per week for 8 weeks    Plan of Care/Certification Expiration Date: 04/10/23 (Start of  [address] 2023)      PT Visit Info:  Plan Frequency: 2-3 visit per week for 8 weeks  Plan of Care/Certification Expiration Date: 04/10/23 (Start of  [address] 2023)      Visit Count:  5    OUTPATIENT PHYSICAL THERAPY:OP NOTE TYPE: Treatment Note 3/1/2023       Episode  }Appt Desk             Treatment Diagnosis:  Aftercare following joint replacement surgery (Z47.1)  Presence of artificial right knee joint (Z96.651)  Right knee pain (M25.561)  Difficulty walking, not else where classified (R26.2)  Medical/Referring Diagnosis:  S/P total knee arthroplasty, right [U26.391]  Referring Physician:  JENNIFER East MD Orders:  PT Eval and Treat  Date of Onset:  Onset Date: 23 (s/p right total knee arthroplasty)     Allergies:   Benazepril and Erythromycin  Restrictions/Precautions:  Restrictions/Precautions: Weight Bearing  Right Lower Extremity Weight Bearing: Weight Bearing As Tolerated  Left Lower Extremity Weight Bearing: Weight Bearing As Tolerated     Interventions Planned (Treatment may consist of any combination of the following):    Current Treatment Recommendations: Strengthening; ROM; Balance training; Functional mobility training; Transfer training; ADL/Self-care training; IADL training; Endurance training; Gait training; Stair training; Neuromuscular re-education; Manual; Pain management; Home exercise program; Safety education & training; Patient/Caregiver education & training; Equipment evaluation, education, & procurement; Modalities; Dry needling;  Therapeutic activities     Subjective Comments:   Patient reports she had stomach bug starting Saturday, feeling better today. She reports that her knee is really stiff because she has not worked on it like she would have. Initial:}    2/10Post Session:       0/10  Medications Last Reviewed:  3/1/2023  Updated Objective Findings:   AROM:  Pre - 5 to 105 degrees Pre --> 0 - 110 degrees Post  Treatment   THERAPEUTIC EXERCISE: (53 minutes):    Exercises per grid below to improve mobility, strength, and coordination. Required moderate visual, verbal, manual, and tactile cues to promote proper body alignment, promote proper body poster and proper body mechanics. Progressed resistance, range, repetitions and complexity of movement as indicated. Date:  2/24/2023 Date:  3/1/2023 Date:  2/23/23   Activity/Exercise Parameters Parameters Parameters   NuStep 10 minutes Level 1 Working on ROM 10 minutes Level 1 Working on ROM X 10 mins level 3    Quad set 10 sec x 20 Seated 10 sec x 20  X 20 reps 5 sec hold    Heel prop 10 minutes Supine on Wedge, Pillow/Towel Under Calf/Heel 10 minutes Supine on Wedge, Pillow/Towel Under Calf/Heel 3 mins quad sets    Ankle Circles   -----   Heel slide Seated 3 x 10 Supine 3 x 20 with Strap Supine wooden sliding board supine with strap at end range x 20 reps    Hip flexion    At bar x 20 reps    Hip abduction    At bar x 20 reps   Hamstring curls    At bar x 20 reps    SLR 3 x 10 Seated 3 x 20 Seated ------   Hamstring stretch   4x30 sec hold with strap   Gastroc stretch Slant Board 3 x 60 sec Slant Board 3 x 60 sec On incline 4x30 sec hold    SKTC   X 10 reps x 5 sec hold bilaterally   Sciatic nerve glide   Seated 2x10 reps    Chair slides    X 20 reps    Sit to stand    2x10 reps    PROM Passive Flexion Supine 8 minutes -- ----   Calf Raises  Seated 3 x 20    Toe Raises  Seated 3 x 20    [employer] access code: [email or web address]    Time spent with patient reviewing proper muscle recruitment and technique with exercises.     MANUAL THERAPY: (0 minutes):   Joint mobilization and Soft tissue mobilization was utilized and necessary because of the patient's restricted joint motion, painful spasm, loss of articular motion, and restricted motion of soft tissue. - Joint mobs: Patellar mobs all planes grade II/III. - Soft tissue mobilization: Distal quad, hamstring, gastroc/soleus, scar mobs. MODALITIES: (0 minutes):        Pt declined ice stating she will ice at home. HEP: As above; handouts given to patient for all exercises. Treatment/Session Summary:    Treatment Assessment:   Patient was able to make ROM gains and progress. Patient continues to be limited at times secondary to low back with left radicular symptoms. Patient required verbal cues for posture, form, and mechanics. Patient would benefit from continued progression of mobility, flexibility, ROM, muscle activation, and strength to progress functionally. Communication/Consultation:   HEP handout provided. Equipment provided today:  None  Recommendations/Intent for next treatment session: Next visit will focus on pain/swelling control, improve right knee ROM, improve RLE strength, progress gait/balance/functional mobility to tolerance.     Total Treatment Billable Duration:  53 minutes  Time In: 0627  Time Out: 100 [facility], PT       Charge Capture  }Post Session Pain  PT Visit Info  [facility] Portal  MD Guidelines  Scanned Media  Benefits  MyChart    Future Appointments   Date Time Provider Catherine Roa   3/3/2023  7:00 AM Salvador Salinas, PT [facility]   3/6/2023 12:30 PM Sandie Ornelas, [facility]   3/8/2023  1:30 PM WALDEMAR HernándezWD [facility]   3/13/2023 12:30 PM Sandie Ornelas PT [facility]   3/14/2023  1:15 PM FLY Sheriff - CNP POAG [facility]   3/15/2023 10:40 AM JENNIFER Peña [facility]   3/15/2023 12:30 PM Sandie Ornelas PT [facility]   3/20/2023 12:30 PM Sandie Ornelas, PT [facility]   3/23/2023 12:30 PM Sandie Ornelas, PT [facility]   3/27/2023 12:30 PM Salvador Salinas, PT [facility]   3/29/2023  1:30 PM Sirisha Cueva, PT [facility]   4/3/2023 12:30 PM Albina Garcia, PT [facility]   4/5/2023 12:30 PM Albina Garcia, PT [facility]

## 2023-03-03 ENCOUNTER — HOSPITAL ENCOUNTER (OUTPATIENT)
Dept: PHYSICAL THERAPY | Age: 68
Setting detail: RECURRING SERIES
End: 2023-03-03
Payer: MEDICARE

## 2023-03-03 NOTE — PROGRESS NOTES
Physical Therapy  [address] [facility],[address] at [facility] 3/3/2023    Patient is sick.        Abby Charter PT, DPT, TPS

## 2023-03-06 ENCOUNTER — HOSPITAL ENCOUNTER (OUTPATIENT)
Dept: PHYSICAL THERAPY | Age: 68
Setting detail: RECURRING SERIES
Discharge: HOME | End: 2023-03-09
Payer: MEDICARE

## 2023-03-06 PROCEDURE — 97110 THERAPEUTIC EXERCISES: CPT

## 2023-03-06 PROCEDURE — 97140 MANUAL THERAPY 1/> REGIONS: CPT

## 2023-03-06 ASSESSMENT — PAIN SCALES - GENERAL: PAINLEVEL_OUTOF10: 3

## 2023-03-06 NOTE — PROGRESS NOTES
Layourban Yeung  : 1955  Primary: Geovanni Mullins [employer]mo (Medicare Managed)  Secondary:  [address] @ [address] [place] [address]  Phone: [phone]  Fax: [phone] Plan Frequency: 2-3 visit per week for 8 weeks    Plan of Care/Certification Expiration Date: 04/10/23 (Start of [address] 2023)      PT Visit Info:  Plan Frequency: 2-3 visit per week for 8 weeks  Plan of Care/Certification Expiration Date: 04/10/23 (Start of [address] 2023)      Visit Count:  6    OUTPATIENT PHYSICAL THERAPY:OP NOTE TYPE: Treatment Note 3/6/2023       Episode  }Appt Desk             Treatment Diagnosis:  Aftercare following joint replacement surgery (Z47.1)  Presence of artificial right knee joint (Z96.651)  Right knee pain (M25.561)  Difficulty walking, not else where classified (R26.2)  Medical/Referring Diagnosis:  S/P total knee arthroplasty, right [R14.833]  Referring Physician:  JENNIFER Ceballos MD Orders:  PT Eval and Treat  Date of Onset:  Onset Date: 23 (s/p right total knee arthroplasty)     Allergies:   Benazepril and Erythromycin  Restrictions/Precautions:  Restrictions/Precautions: Weight Bearing  Right Lower Extremity Weight Bearing: Weight Bearing As Tolerated  Left Lower Extremity Weight Bearing: Weight Bearing As Tolerated     Interventions Planned (Treatment may consist of any combination of the following):    Current Treatment Recommendations: Strengthening; ROM; Balance training; Functional mobility training; Transfer training; ADL/Self-care training; IADL training; Endurance training; Gait training; Stair training; Neuromuscular re-education; Manual; Pain management; Home exercise program; Safety education & training; Patient/Caregiver education & training; Equipment evaluation, education, & procurement; Modalities; Dry needling;  Therapeutic activities     Subjective Comments:  Pt reports feeling better after recent illness; per pt has not been able to keep up with her exercises as often this weekend due to recovering from illness. Initial:}    3/10Post Session:       2/10  Medications Last Reviewed:  3/6/2023  Updated Objective Findings:   AAROM:  Pre - 3 to 107 degrees Pre --> 0 - 113 degrees Post  Treatment   THERAPEUTIC EXERCISE: (45 minutes):    Exercises per grid below to improve mobility, strength, and coordination. Required moderate visual, verbal, manual, and tactile cues to promote proper body alignment, promote proper body poster and proper body mechanics. Progressed resistance, range, repetitions and complexity of movement as indicated. Date:  2/24/2023 Date:  3/1/2023 Date:  3/6/23   Activity/Exercise Parameters Parameters Parameters   NuStep 10 minutes Level 1 Working on ROM 10 minutes Level 1 Working on ROM X 10 mins level 2    Quad set 10 sec x 20 Seated 10 sec x 20  X 20 reps 5 sec hold    Heel prop 10 minutes Supine on Wedge, Pillow/Towel Under Calf/Heel 10 minutes Supine on Wedge, Pillow/Towel Under Calf/Heel 5 mins quad sets    Heel slide Seated 3 x 10 Supine 3 x 20 with Strap Supine 3 x 15 with Strap   Hip flexion     --   Hip abduction    --   Hamstring curls    --   SLR 3 x 10 Seated 3 x 20 Seated Supine, 2x10    Hamstring stretch   4x30 sec hold with strap   Gastroc stretch Slant Board 3 x 60 sec Slant Board 3 x 60 sec Slantboard 4x30 sec hold    SKTC   Verbal review   Sciatic nerve glide   Verbal review   Chair slides    --   Sit to stand    1x10 reps     1x5 reps   PROM Passive Flexion Supine 8 minutes -- Passive flexion/extension 10 mins. Calf Raises  Seated 3 x 20 Seated x30   Toe Raises  Seated 3 x 20 Seated x30   [employer] access code: [email or web address]    Time spent with patient reviewing proper muscle recruitment and technique with exercises.     MANUAL THERAPY: (10 minutes):   Joint mobilization and Soft tissue mobilization was utilized and necessary because of the patient's restricted joint motion, painful spasm, loss of articular motion, and restricted motion of soft tissue. - Joint mobs: Patellar mobs all planes grade II/III. - Soft tissue mobilization: Distal quad, hamstring, gastroc/soleus, scar mobs. MODALITIES: (0 minutes):        Pt declined ice stating she will ice at home. HEP: As above; handouts given to patient for all exercises. Treatment/Session Summary:    Treatment Assessment:  Good tolerance to exercise program observed; verbal cues to promote increased knee flexion stretching during heel slide with strap exercise. Pt encouraged to return to daily HEP performance nowthat she has recovered from recent illness in order to further progress right knee ROM. Communication/Consultation:   None today. Equipment provided today:  None  Recommendations/Intent for next treatment session: Next visit will focus on pain/swelling control, improve right knee ROM, improve RLE strength, progress gait/balance/functional mobility to tolerance.     Total Treatment Billable Duration:  55 minutes  Time In: 4827  Time Out: 1331    Panfilo Dye, PT       Charge Capture  }Post Session Pain  PT Visit Info  [facility] Portal  MD Guidelines  Scanned Media  Benefits  MyChart    Future Appointments   Date Time Provider Catherine Roa   3/8/2023  1:30 PM Cole Valenzuela, PT [facility]   3/13/2023 12:30 PM Panfilo Dye, PT [facility] [place]   3/14/2023  1:15 PM FLY Yao - CNP POAG [facility]   3/15/2023 10:40 AM JENNIFER Kinsey POAI [facility]   3/15/2023 12:30 PM Panfilo Dye, PT [facility]   3/20/2023 12:30 PM Panfilo Dye, PT [facility]   3/23/2023 12:30 PM Panfilo Dye, PT [facility]   3/27/2023 12:30 PM Gar Bianca, PT [facility]   3/29/2023  1:30 PM Cole Valenzuela, PT [facility]   4/3/2023 12:30 PM Panfilo Dye, PT [facility]   4/5/2023 12:30 PM Panfilo Dye, PT [facility]

## 2023-03-08 ENCOUNTER — HOSPITAL ENCOUNTER (OUTPATIENT)
Dept: PHYSICAL THERAPY | Age: 68
Setting detail: RECURRING SERIES
Discharge: HOME | End: 2023-03-11
Payer: MEDICARE

## 2023-03-08 PROCEDURE — 97110 THERAPEUTIC EXERCISES: CPT

## 2023-03-08 PROCEDURE — 97140 MANUAL THERAPY 1/> REGIONS: CPT

## 2023-03-08 ASSESSMENT — PAIN SCALES - GENERAL: PAINLEVEL_OUTOF10: 4

## 2023-03-08 NOTE — PROGRESS NOTES
Rafa Stern  : 1955  Primary: Damian Mullins Hmo (Medicare Managed)  Secondary:  [address] @ [address] [place] [address]  Phone: [phone]  Fax: [phone] Plan Frequency: 2-3 visit per week for 8 weeks    Plan of Care/Certification Expiration Date: 04/10/23 (Start of  [address])      PT Visit Info:  Plan Frequency: 2-3 visit per week for 8 weeks  Plan of Care/Certification Expiration Date: 04/10/23 (Start of  [address] 20[address])      Visit Count:  7    OUTPATIENT PHYSICAL THERAPY:OP NOTE TYPE: Treatment Note 3/8/2023       Episode  }Appt Desk             Treatment Diagnosis:  Aftercare following joint replacement surgery (Z47.1)  Presence of artificial right knee joint (Z96.651)  Right knee pain (M25.561)  Difficulty walking, not else where classified (R26.2)  Medical/Referring Diagnosis:  S/P total knee arthroplasty, right [F31.912]  Referring Physician:  JENNIFER Dorado MD Orders:  PT Eval and Treat  Date of Onset:  Onset Date: 23 (s/p right total knee arthroplasty)     Allergies:   Benazepril and Erythromycin  Restrictions/Precautions:  Restrictions/Precautions: Weight Bearing  Right Lower Extremity Weight Bearing: Weight Bearing As Tolerated  Left Lower Extremity Weight Bearing: Weight Bearing As Tolerated     Interventions Planned (Treatment may consist of any combination of the following):    Current Treatment Recommendations: Strengthening; ROM; Balance training; Functional mobility training; Transfer training; ADL/Self-care training; IADL training; Endurance training; Gait training; Stair training; Neuromuscular re-education; Manual; Pain management; Home exercise program; Safety education & training; Patient/Caregiver education & training; Equipment evaluation, education, & procurement; Modalities; Dry needling;  Therapeutic activities     Subjective Comments:   Patient reports that she is having tightness and swelling right lateral quad/knee. Initial:}    4/10Post Session:       0/10  Medications Last Reviewed:  3/8/2023  Updated Objective Findings:   AAROM:  Pre - 2 to 106 degrees Pre --> 0 - 114 degrees Post  Treatment   THERAPEUTIC EXERCISE: (23 minutes):    Exercises per grid below to improve mobility, strength, and coordination. Required moderate visual, verbal, manual, and tactile cues to promote proper body alignment, promote proper body poster and proper body mechanics. Progressed resistance, range, repetitions and complexity of movement as indicated. Date:  3/8/2023 Date:  3/1/2023 Date:  3/6/23   Activity/Exercise Parameters Parameters Parameters   NuStep  10 minutes Level 1 Working on ROM X 10 mins level 2    Quad set 10 sec x 20 Seated 10 sec x 20  X 20 reps 5 sec hold    Heel prop  10 minutes Supine on Wedge, Pillow/Towel Under Calf/Heel 5 mins quad sets    Heel slide Seated 3 x 10 Supine 3 x 20 with Strap Supine 3 x 15 with Strap   Hip flexion     --   Hip abduction    --   Hamstring curls    --   SLR 3 x 10 Seated 3 x 20 Seated Supine, 2x10    Hamstring stretch   4x30 sec hold with strap   Gastroc stretch Seated 6 x 30 with Strap Slant Board 3 x 60 sec Slantboard 4x30 sec hold    SKTC   Verbal review   Sciatic nerve glide   Verbal review   Chair slides    --   Sit to stand    1x10 reps     1x5 reps   PROM Passive Flexion Seated 8 minutes -- Passive flexion/extension 10 mins. Calf Raises  Seated 3 x 20 Seated x30   Toe Raises  Seated 3 x 20 Seated x30   Weight Shift Side Stepping 3 x 50 feet Bilateral UE Support     FWD/BWD Walking 3 x 50 feet      [employer] access code: [email or web address]    Time spent with patient reviewing proper muscle recruitment and technique with exercises. MANUAL THERAPY: (30 minutes):   Joint mobilization and Soft tissue mobilization was utilized and necessary because of the patient's restricted joint motion, painful spasm, loss of articular motion, and restricted motion of soft tissue. - Joint mobs: Patellar mobs all planes grade II/III. - Soft tissue mobilization: Distal quad, hamstring, gastroc/soleus, scar mobs. MODALITIES: (0 minutes):        Pt declined ice stating she will ice at home. HEP: As above; handouts given to patient for all exercises. Treatment/Session Summary:    Treatment Assessment:   Patient reported decreased pain, swelling, and tightness with treatment. Patient's HEP was reviewed. Patient continues to have need for verbal cues and demonstration related to correct weight bearing and muscle activation. She would benefit from continued progression. Communication/Consultation:   None today. Equipment provided today:  None  Recommendations/Intent for next treatment session: Next visit will focus on pain/swelling control, improve right knee ROM, improve RLE strength, progress gait/balance/functional mobility to tolerance.     Total Treatment Billable Duration:  55 minutes  Time In: 1330  Time Out: 130 [facility], PT       Charge Capture  }Post Session Pain  PT Visit [address] Portal  MD Guidelines  Scanned Media  Benefits  MyChart    Future Appointments   Date Time Provider Catherine Roa   3/13/2023 12:30 PM [place] Salvage, PT [facility]   3/14/2023  1:15 PM FLY Lal - CNP POAG [facility]   3/15/2023 10:40 AM JENNIFER Shrestha [facility]   3/15/2023 12:30 PM Leelee Salvage, PT [facility]   3/20/2023 12:30 PM [place] Salv[place], PT [facility]   3/23/2023 12:30 PM [place] Salvage, PT [facility]   3/27/2023 12:30 PM Lindaona [place], PT [facility]   3/29/2023  1:30 PM Lindaona Donn, PT [facility]   4/3/2023 12:30 PM Leelee Salvage, PT [facility]   4/5/2023 12:30 PM [place] Salvage, PT [facility]

## 2023-03-14 ENCOUNTER — OFFICE VISIT (OUTPATIENT)
Dept: ORTHOPEDIC SURGERY | Age: 68
End: 2023-03-14
Payer: MEDICARE

## 2023-03-14 DIAGNOSIS — M54.16 LUMBAR RADICULOPATHY: Primary | ICD-10-CM

## 2023-03-14 DIAGNOSIS — M48.061 STENOSIS OF LATERAL RECESS OF LUMBAR SPINE: ICD-10-CM

## 2023-03-14 DIAGNOSIS — M48.062 SPINAL STENOSIS, LUMBAR REGION WITH NEUROGENIC CLAUDICATION: ICD-10-CM

## 2023-03-14 PROCEDURE — 99214 OFFICE O/P EST MOD 30 MIN: CPT | Performed by: NURSE PRACTITIONER

## 2023-03-14 PROCEDURE — G8399 PT W/DXA RESULTS DOCUMENT: HCPCS | Performed by: NURSE PRACTITIONER

## 2023-03-14 PROCEDURE — G8484 FLU IMMUNIZE NO ADMIN: HCPCS | Performed by: NURSE PRACTITIONER

## 2023-03-14 PROCEDURE — G8428 CUR MEDS NOT DOCUMENT: HCPCS | Performed by: NURSE PRACTITIONER

## 2023-03-14 PROCEDURE — 1090F PRES/ABSN URINE INCON ASSESS: CPT | Performed by: NURSE PRACTITIONER

## 2023-03-14 PROCEDURE — G8417 CALC BMI ABV UP PARAM F/U: HCPCS | Performed by: NURSE PRACTITIONER

## 2023-03-14 PROCEDURE — 1036F TOBACCO NON-USER: CPT | Performed by: NURSE PRACTITIONER

## 2023-03-14 PROCEDURE — 3017F COLORECTAL CA SCREEN DOC REV: CPT | Performed by: NURSE PRACTITIONER

## 2023-03-14 PROCEDURE — 1123F ACP DISCUSS/DSCN MKR DOCD: CPT | Performed by: NURSE PRACTITIONER

## 2023-03-14 RX ORDER — TRAMADOL HYDROCHLORIDE 50 MG/1
50 TABLET ORAL EVERY 6 HOURS PRN
Qty: 28 TABLET | Refills: 0 | Status: SHIPPED | OUTPATIENT
Start: 2023-03-14 | End: 2023-03-21

## 2023-03-14 NOTE — PATIENT INSTRUCTIONS
Epidural Steroid Injection / Selective Nerve Root Block  What is it? An epidural steroid injection (CLAUDE) is an injection of an anti-inflammatory medication directly on the sac that covers the spinal cord and nerves. A Selective Nerve Root Block (SNRB) is an injection that is directed around a specific nerve. Your physician usually orders an injection  when you have symptoms of an irritated spinal nerve. These symptoms can include back, buttock or leg pain, weakness, or numbness. Irritated spinal nerves are often caused by disc herniations or a tight spinal canal (stenosis). The goal of the steroid injection is to reduce the inflammation around the spinal cord and nerves, which should reduce the amount of back and leg pain you are experiencing. Epidural injections are often done in series. It would not be unusual to have two or three injections in a row 10 to 14 days apart. The reason for multiple injections is that the relief from the injection may wear off over time. And sometimes it takes multiple doses of steroid injection to obtain the most anti-inflammatory effect around the nerves. How is it performed? You will be asked to lie on your side or stomach on the x-ray table. This will allow the physician to position you in the best way possible to access your back. Next, the skin will be cleaned and numbed with a local anesthetic. An X-ray machine will then be used to guide a small gauge needle into the space over your spinal sac. A small amount of dye will then be injected to insure the needle is in the proper position. Finally, a mixture of numbing medicine and anti-inflammatory (steroid/cortisone) will be injected. How long does it take? All together it should take about 1 hour. The back and legs may feel weak or numb for a couple of hours after the injection. Plan the have someone drive you home. Are there any restrictions after the CLAUDE?    Try to spend the remainder of the evening resting as much as possible. You may return to your normal activities the day after the procedure, including returning to work. What are the risks? The most common risk is a spinal headache. This happens when the needle passes through the spinal sac and can result in leakage of spinal fluid. This is usually treated with lying in a flat position and high fluid intake. Rarely, spinal headaches lasting more than a few days can be treated with a small procedure called a blood patch. Another risk, though very small, is the injection of the medication into one of the tiny blood vessels in the spinal canal.  This can result in serious complications such as seizures, cardiac arrest and even death. Fortunately, these complications are quite rare. Other rare complications include infection, bleeding into the spinal canal (epidural hematoma), loss of bladder control, and injury to a nerve with the needle. Who should not have an CLAUDE? The injection of a steroid anywhere in the body can cause a significant increase in the blood sugar level. Diabetics are very sensitive to steroids and should have them administered with caution. Diabetic patients can have injections but need to watch their blood sugar after the injection and discuss with their Primary Care Physician a plan to manage elevations in blood sugar. Steroids also decrease the body's ability to fight infection. Thus, any person with an active infection should not take a steroid medication until the infection has been cleared completely. If you are taking an antibiotic for an active infection, please notify our office.    Additionally, some patients may have anatomic abnormalities or have had previous back surgeries which may not allow the needle to pass into the spinal canal.     Medications that result in thinning of the blood such as coumadin, aspirin, Plavix, Eliquis, Xarelto and many anti-inflammatory medications need to be discontinued 5-7 days prior to the injection. Check with your doctor regarding specific drugs you are taking.  Med    [facility]    MD Maikel Perez MD Amy Dorcus Bays, PA-C Mardel Kitten, NP    Main Office  [address] [facility],[address], [facility], [facility] [address]        For Appointments at either location, please call [phone] that result in thinning of blood such as Coumadin, Aspirin, Plavix, and many anti-inflammatories, need to

## 2023-03-16 ENCOUNTER — HOSPITAL ENCOUNTER (OUTPATIENT)
Dept: PHYSICAL THERAPY | Age: 68
Setting detail: RECURRING SERIES
Discharge: HOME | End: 2023-03-19
Payer: MEDICARE

## 2023-03-16 PROCEDURE — 97110 THERAPEUTIC EXERCISES: CPT

## 2023-03-16 PROCEDURE — 97140 MANUAL THERAPY 1/> REGIONS: CPT

## 2023-03-16 ASSESSMENT — PAIN SCALES - GENERAL: PAINLEVEL_OUTOF10: 4

## 2023-03-16 NOTE — PROGRESS NOTES
Greg Cox  : 1955  Primary: [facility] (Medicare Managed)  Secondary:  [address] @ [address] [place] [id number]  Phone: [phone]  Fax: [phone] Plan Frequency: 2-3 visit per week for 8 weeks    Plan of Care/Certification Expiration Date: 04/10/23 (Start of  [address] 2023)      PT Visit Info:  Plan Frequency: 2-3 visit per week for 8 weeks  Plan of Care/Certification Expiration Date: 04/10/23 (Start of  [address] 2023)      Visit Count:  8    OUTPATIENT PHYSICAL THERAPY:OP NOTE TYPE: Treatment Note 3/16/2023       Episode  }Appt Desk             Treatment Diagnosis:  Aftercare following joint replacement surgery (Z47.1)  Presence of artificial right knee joint (Z96.651)  Right knee pain (M25.561)  Difficulty walking, not else where classified (R26.2)  Medical/Referring Diagnosis:  S/P total knee arthroplasty, right [R60.286]  Referring Physician:  JENNIFER Delvalle MD Orders:  PT Eval and Treat  Date of Onset:  Onset Date: 23 (s/p right total knee arthroplasty)     Allergies:   Benazepril and Erythromycin  Restrictions/Precautions:  Restrictions/Precautions: Weight Bearing  Right Lower Extremity Weight Bearing: Weight Bearing As Tolerated  Left Lower Extremity Weight Bearing: Weight Bearing As Tolerated     Interventions Planned (Treatment may consist of any combination of the following):    Current Treatment Recommendations: Strengthening; ROM; Balance training; Functional mobility training; Transfer training; ADL/Self-care training; IADL training; Endurance training; Gait training; Stair training; Neuromuscular re-education; Manual; Pain management; Home exercise program; Safety education & training; Patient/Caregiver education & training; Equipment evaluation, education, & procurement; Modalities; Dry needling;  Therapeutic activities     Subjective Comments:  Quad muscle tightness decreased overall today compared to
improve independence and safety with functional transfers. - goal met 3/16/2023  Pt will demonstrate right knee PROM 0-115 degrees or greater in order to progress towards independent functional transfers including sit to stand.- Goal met 3/16/2023  Discharge Goals: Time Frame: 2/13/2023 to 4/10/2023  Pt will report worst pain 3/10 or less with prolonged standing/walking 15 mins or greater in order to return to unrestricted community distance ambulation. - ongoing  Pt will demonstrate right knee AROM 0-120 degrees or greater in order to return to independent stair navigation. - ongoing  Pt will demonstrate 5 times sit to stand test time of 18 sec or less in order to demonstrate gross functional strength improvement. - ongoing  KOOS score of 5/28 or less in order to demonstrate overall functional improvement. - NT 3/16/2023         Outcome Measure: Tool Used: Knee injury and Osteoarthritis Outcome Score for Joint Replacement (KOOS, JR)  Score:  Initial:  9/28 Most Recent: TBD   Interpretation of Score: The KOOS, JR contains 7 items from the original KOOS survey. Items are coded from 0 to 4, none to extreme respectively. Maria  is scored by summing the raw response (range 0-28) and then converting it to an interval score using the table provided below. The interval score ranges from 0 to 100 where 0 represents total knee disability and 100 represents perfect knee health. Medical Necessity:   > Patient is expected to demonstrate progress in strength, range of motion, balance, coordination, and functional technique to increase independence with community distance ambulation, functional transfers, ADL performance and light house work activities. > Skilled intervention continues to be required due to decreased AROM, decreased strength, decreased balance, decreased gait, decreased functional mobility.   Reason For Services/Other Comments:  > Patient continues to require skilled intervention due to increasing

## 2023-03-17 ENCOUNTER — HOSPITAL ENCOUNTER (OUTPATIENT)
Dept: PHYSICAL THERAPY | Age: 68
Setting detail: RECURRING SERIES
End: 2023-03-17
Payer: MEDICARE

## 2023-03-17 NOTE — PROGRESS NOTES
Physical Therapy  [address] [place] [address]  Date: 3/17/2023    Patient cancelled today due to increased back pain symptoms.       Camilla Kumar DPT

## 2023-03-17 NOTE — PROGRESS NOTES
Name: Anjana Luke  YOB: 1955  Gender: female  MRN: [id number]    CC: Follow-up left lower back and buttock pain    HPI: This is a 67 y.o. year old female who has had an intermittent 3-year history of left lower back pain rating into the buttock.  It has gotten worse over the past 6 months.  Patient had her left knee replaced in September 2022.  She had back pain prior to that.  It can sometimes radiate down her leg but primarily the left lower back at L5 with hip and buttock complaints.  She recently had her right knee replaced 2 weeks ago.  She is having difficulty doing therapy due to her left lower back pain complaints.  Current treatment has included physical therapy in September and October 2022 that did focus on her lumbar spine as well as her knee, gabapentin, Robaxin, hydrocodone, Tylenol.    Pain is a 10 out of 10.     AMB PAIN ASSESSMENT 2/21/2023   Location of Pain Back   Location Modifiers Left   Severity of Pain 10   Quality of Pain Sharp;Aching   Duration of Pain Persistent   Frequency of Pain Constant   Aggravating Factors Walking   Limiting Behavior Yes   Relieving Factors Ice   Result of Injury No   Work-Related Injury No   Are there other pain locations you wish to document? No            Allergies   Allergen Reactions    Benazepril Other (See Comments)    Erythromycin Other (See Comments)     Other reaction(s): Abdominal pain-I  Other reaction(s): Abdominal pain-I  GI upset       Current Outpatient Medications:     traMADol (ULTRAM) 50 MG tablet, Take 1 tablet by mouth every 6 hours as needed for Pain for up to 7 days. Intended supply: 7 days. Take lowest dose possible to manage pain Max Daily Amount: 200 mg, Disp: 28 tablet, Rfl: 0    traMADol (ULTRAM) 50 MG tablet, , Disp: , Rfl:     methocarbamol (ROBAXIN-750) 750 MG tablet, Take 1 tablet by mouth every 6 hours as needed for spasms., Disp: 40 tablet, Rfl: 0    aspirin 81 MG EC tablet, Take 1 tablet by mouth 2 times daily,  Disp: 60 tablet, Rfl: 0    celecoxib (CELEBREX) 200 MG capsule, Take 1 capsule by mouth daily, Disp: 30 capsule, Rfl: 0    methocarbamol (ROBAXIN) 750 MG tablet, Take 1 tablet by mouth 4 times daily as needed (muscle spasms), Disp: 40 tablet, Rfl: 0    promethazine (PHENERGAN) 25 MG tablet, Take 1 tablet by mouth every 6 hours as needed for Nausea, Disp: 40 tablet, Rfl: 0    docusate sodium (COLACE) 100 MG capsule, Take 100 mg by mouth 2 times daily. , Disp: , Rfl:     Ferrous Sulfate Dried (FERROUS SULFATE IRON PO), Take 1 tablet by mouth daily. 65 mg, Disp: , Rfl:     Calcium Citrate-Vitamin D (CALCIUM CITRATE + D3 PO), Take 1 tablet by mouth daily. , Disp: , Rfl:     Ascorbic Acid (VITAMIN C) 500 MG CAPS, Take 1 tablet by mouth daily. , Disp: , Rfl:     acetaminophen (TYLENOL) 500 MG tablet, Take 1,000 mg by mouth every 6 hours as needed (breakthrough pain), Disp: , Rfl:     LORazepam (ATIVAN) 1 MG tablet, Take 1 mg by mouth 3 times daily as needed for Anxiety. , Disp: , Rfl:     linaclotide (LINZESS) 145 MCG capsule, Take 145 mcg by mouth every morning (before breakfast), Disp: , Rfl:     albuterol sulfate  (90 Base) MCG/ACT inhaler, Inhale 1 puff into the lungs every 4 hours as needed for Wheezing, Disp: , Rfl:     amLODIPine (NORVASC) 10 MG tablet, Take 10 mg by mouth daily, Disp: , Rfl:     carvedilol (COREG) 6.25 MG tablet, Take 6.25 mg by mouth 2 times daily (with meals), Disp: , Rfl:     DULoxetine (CYMBALTA) 60 MG extended release capsule, Take 60 mg by mouth 2 times daily, Disp: , Rfl:     fluticasone (FLONASE) 50 MCG/ACT nasal spray, 1 to 2 sprays each nostril daily, Disp: , Rfl:     gabapentin (NEURONTIN) 100 MG capsule, Take 200 mg by mouth at bedtime. , Disp: , Rfl:     hydroCHLOROthiazide (HYDRODIURIL) 25 MG tablet, Take 25 mg by mouth daily, Disp: , Rfl:     potassium chloride (KLOR-CON) 10 MEQ extended release tablet, Take 10 mEq by mouth 2 times daily, Disp: , Rfl:     QUEtiapine (SEROQUEL) 25 MG tablet, Take 100 mg by mouth at bedtime, Disp: , Rfl:     rosuvastatin (CRESTOR) 20 MG tablet, Take 10 mg by mouth at bedtime, Disp: , Rfl:   Past Medical History:   Diagnosis Date    Allergic rhinitis     Anxiety     Arthritis     Autoimmune hepatitis ([facility] [address]     Pt states diagnosis not accurate- was thought to be hepatitis but was a bile duct that had been cut during surgery    Chronic pain syndrome     Depression     Ear problems     Elevated liver enzymes     resolved per pt    Facet arthritis of lumbar region     GERD (gastroesophageal reflux disease)     Hearing reduced     Hyperlipemia     managed with medication    Hypertension     managed with medication    Iron deficiency anemia     hx    Menopausal disorder     Metabolic syndrome     Osteoporosis     Restrictive lung disease     PRN inhaler-last use 9/2022    Thickened endometrium      Tobacco:  reports that she has never smoked. She has never used smokeless tobacco.  Alcohol:   Social History     Substance and Sexual Activity   Alcohol Use No          Radiographic Studies:       MRI Results (most recent): MRI Result (most recent): MRI LUMBAR SPINE WO CONTRAST 02/28/2023    Narrative  Exam: MRI lumbar spine without contrast.  Indication: Low back pain with left leg pain  Comparison: March spine radiographs, 2/21/2023  Contrast: None  Technique: Multiplanar multisequence imaging of the lumbar spine was performed  without contrast.    FINDINGS:  There are 5 nonrib-bearing lumbar-type vertebral bodies. Levoscoliosis centered  at L2-L3. Minimal retrolisthesis of L3 on L4. Vertebral body heights are  preserved. No fracture or aggressive marrow signal abnormality. The conus  medullaris terminates in expected position. Cauda equina nerve roots are  unremarkable. Perivertebral soft tissues are unremarkable. Small bilateral renal  cysts.     Multilevel disc desiccation with varying degrees of disc space narrowing, most  severe at L3-L4 where there are small endplate irregularities and type II Modic  change. L1-L2: Diffuse disc bulge with bilateral facet arthropathy and ligamentum flavum  hypertrophy. Mild spinal canal stenosis without neuroforaminal stenosis. L2-L3: Diffuse disc bulge with bilateral facet arthropathy and ligamentum flavum  hypertrophy. Mild spinal canal stenosis with mild left and moderate right  neuroforaminal stenoses. L3-L4: Diffuse disc bulge with bilateral facet arthropathy and ligamentum flavum  hypertrophy. Moderate spinal canal stenosis with moderate left and severe right  neuroforaminal stenoses. L4-L5: Diffuse disc bulge with bilateral facet arthropathy and ligamentum flavum  hypertrophy. Moderate spinal canal stenosis with mild right and severe left  neuroforaminal stenoses. L5-S1: Shallow diffuse disc bulge with advanced bilateral facet arthropathy with  ligamentum flavum hypertrophy. There is effacement of the left lateral recess  with moderate left and mild right neuroforaminal stenoses. Impression  1. Lumbar levoscoliosis with advanced degenerative disc disease and facet  arthropathy. 2. Spinal canal stenoses are most pronounced and moderate at L3-L4 and L4-L5. 3. Neuroforaminal stenoses are most pronounced and severe on the right at L3-L4  and on the left at L4-L5. Assessment/Plan:        ICD-10-CM    1. Lumbar radiculopathy  M54.16 Injection Authorization - Spine      2. Spinal stenosis, lumbar region with neurogenic claudication  M48.062 traMADol (ULTRAM) 50 MG tablet     Injection Authorization - Spine      3. Stenosis of lateral recess of lumbar spine  M48.061 Injection Authorization - Spine           I reviewed patient's MRI findings. We discussed the pathophysiology. There is lateral recess stenosis at 4 5 and L5-S1 obviously causing her left L5 and S1 radiculopathy. We discussed interventional management. She would like to pursue this.   Patient will be referred for a left L5 and S1 selective nerve root block. She is also inquiring about pain medication. I told her I am happy to prescribe tramadol.    - Opioid: The patient was prescribed an oral pain medication. The patient understands that this is a temporary measure to bring acute or post-surgical pain under control and that it is out of the scope of this practice to continue opiates on a long-term basis. The [place] Controlled Substance database was reviewed prior to prescribing.   - Injection: The patient will be referred for a lumbar steroid injection to help reduce the symptoms. The patient understands the risks including hyperglycemia, immunosuppression, meningitis, cerebrospinal fluid leak, epidural hematoma, and reaction to medication. The patient may benefit from additional injections depending on the response. The injection should be a left L5 and S1 selective nerve root block      Orders Placed This Encounter   Medications    traMADol (ULTRAM) 50 MG tablet     Sig: Take 1 tablet by mouth every 6 hours as needed for Pain for up to 7 days. Intended supply: 7 days. Take lowest dose possible to manage pain Max Daily Amount: 200 mg     Dispense:  28 tablet     Refill:  0     Reduce doses taken as pain becomes manageable        Orders Placed This Encounter   Procedures    Injection Authorization - Spine        4 This is a chronic illness/condition with exacerbation and progression      Return for Injection follow up. FLY Gipson - CNP  03/17/23      Elements of this note were created using speech recognition software. As such, errors of speech recognition may be present.

## 2023-03-20 ENCOUNTER — HOSPITAL ENCOUNTER (OUTPATIENT)
Dept: PHYSICAL THERAPY | Age: 68
Setting detail: RECURRING SERIES
End: 2023-03-20
Payer: MEDICARE

## 2023-03-20 NOTE — PROGRESS NOTES
Physical Therapy  [address] [place] [address]  Date: 3/20/2023    Patient cancelled appointment for today however was rescheduled for 3/24.       NORAH LermaT

## 2023-03-21 ENCOUNTER — OFFICE VISIT (OUTPATIENT)
Dept: ORTHOPEDIC SURGERY | Age: 68
End: 2023-03-21

## 2023-03-21 DIAGNOSIS — M54.16 LUMBAR RADICULOPATHY: Primary | ICD-10-CM

## 2023-03-21 RX ORDER — TRIAMCINOLONE ACETONIDE 40 MG/ML
160 INJECTION, SUSPENSION INTRA-ARTICULAR; INTRAMUSCULAR ONCE
Status: COMPLETED | OUTPATIENT
Start: 2023-03-21 | End: 2023-03-21

## 2023-03-21 RX ADMIN — TRIAMCINOLONE ACETONIDE 160 MG: 40 INJECTION, SUSPENSION INTRA-ARTICULAR; INTRAMUSCULAR at 10:21

## 2023-03-21 NOTE — PROGRESS NOTES
Date: 03/21/23   Name: Mukul Tam    Pre-Procedural Diagnosis:    Diagnosis Orders   1. Lumbar radiculopathy  FL NERVE BLOCK LUMBOSACRAL 1ST    FL NERVE BLOCK LUMBOSACRAL EACH ADD    triamcinolone acetonide (KENALOG-40) injection 160 mg          Procedure: Selective Nerve Root Blocks (Transforaminal) - Multiple Level    Precautions:  [facility] Precautions spine injections: None. Patient denies any prior sensitivity to steroid, local anesthetic, contrast dye, iodine or shellfish. The procedure was discussed at length with the patient and informed consent was signed. The patient was placed in a prone position on the fluoroscopy table and the skin was prepped and draped in a routine sterile fashion. The areas to be injected were each anesthetized with approximately 5 cc of 1% Lidocaine. A 22-gauge 3.5 inch spinal needle was carefully advanced under fluoroscopic guidance to the left L5 transforaminal space and subsequently the left S1 transforaminal space. At this time 0.25 cc of omnipaque administered. . Once proper placement was confirmed, 2 cc of 0.25% Marcaine and 80 mg of Kenalog were injected through the spinal needle at each site. Fluoroscopic guidance was used intermittently over a 10-minute period to insure proper needle placement and patient safety. A hard copy of the fluoroscopic  images has been placed in the patient's chart. The patient was monitored after the procedure and discharged home in stable fashion.      Resume Meds:  N/A    Joseline Du MD  03/21/23

## 2023-03-22 ENCOUNTER — OFFICE VISIT (OUTPATIENT)
Dept: ORTHOPEDIC SURGERY | Age: 68
End: 2023-03-22

## 2023-03-22 DIAGNOSIS — Z09 FOLLOW-UP EXAMINATION: Primary | ICD-10-CM

## 2023-03-22 DIAGNOSIS — Z96.651 HISTORY OF TOTAL KNEE ARTHROPLASTY, RIGHT: ICD-10-CM

## 2023-03-22 PROCEDURE — 99024 POSTOP FOLLOW-UP VISIT: CPT | Performed by: ORTHOPAEDIC SURGERY

## 2023-03-22 NOTE — PROGRESS NOTES
Name: Josias Vega  YOB: 1955  Gender: female  MRN: [id number]      Chief complaint: Right TKA skin recheck    History of Present Illness:     Josias Vega is a 69 y.o. female  She returns today for recheck regarding her right knee and wound check. Overall she is doing quite well today states he has no significant issues. She still doing physical therapy for her right TKA is tolerating that well. Currently she is walking without any assistive device. Physical exam: Skin of the right knee reveals well-healed surgical incision with no significant erythema bruising or swelling today. Overall her range of motion is quite good from about 3 to 115 degrees flexion. Diagnoses:  Status post right TKA with resolved skin concern    Plan:  Advised patient overall her right knee does appear completely benign at this point. The erythema that she had has resolved. She continue with her activities exercise as she tolerates and will be dismissed from outpatient physical therapy the next 2 to 3 weeks. We will see her back for her midterm follow-up in about 5 months. She will call if she needs to be seen sooner.       JENNIFER Guevara

## 2023-03-23 ENCOUNTER — HOSPITAL ENCOUNTER (OUTPATIENT)
Dept: PHYSICAL THERAPY | Age: 68
Setting detail: RECURRING SERIES
Discharge: HOME | End: 2023-03-26
Payer: MEDICARE

## 2023-03-23 PROCEDURE — 97110 THERAPEUTIC EXERCISES: CPT

## 2023-03-23 PROCEDURE — 97140 MANUAL THERAPY 1/> REGIONS: CPT

## 2023-03-23 ASSESSMENT — PAIN SCALES - GENERAL: PAINLEVEL_OUTOF10: 3

## 2023-03-23 NOTE — PROGRESS NOTES
Tereso Jerel  : 1955  Primary: [facility] (Medicare Managed)  Secondary:  [address] @ [address] [place] [address]  Phone: [phone]  Fax: [phone] Plan Frequency: 2-3 visit per week for 8 weeks    Plan of Care/Certification Expiration Date: 04/10/23 (Start of  [address]23)      PT Visit Info:  Plan Frequency: 2-3 visit per week for 8 weeks  Plan of Care/Certification Expiration Date: 04/10/23 (Start of  [address] 2023)      Visit Count:  9    OUTPATIENT PHYSICAL THERAPY:OP NOTE TYPE: Treatment Note 3/23/2023       Episode  }Appt Desk             Treatment Diagnosis:  Aftercare following joint replacement surgery (Z47.1)  Presence of artificial right knee joint (Z96.651)  Right knee pain (M25.561)  Difficulty walking, not else where classified (R26.2)  Medical/Referring Diagnosis:  S/P total knee arthroplasty, right [H12.661]  Referring Physician:  JENNIFER Jaramillo MD Orders:  PT Eval and Treat  Date of Onset:  Onset Date: 23 (s/p right total knee arthroplasty)     Allergies:   Benazepril and Erythromycin  Restrictions/Precautions:  Restrictions/Precautions: Weight Bearing  Right Lower Extremity Weight Bearing: Weight Bearing As Tolerated  Left Lower Extremity Weight Bearing: Weight Bearing As Tolerated     Interventions Planned (Treatment may consist of any combination of the following):    Current Treatment Recommendations: Strengthening; ROM; Balance training; Functional mobility training; Transfer training; ADL/Self-care training; IADL training; Endurance training; Gait training; Stair training; Neuromuscular re-education; Manual; Pain management; Home exercise program; Safety education & training; Patient/Caregiver education & training; Equipment evaluation, education, & procurement; Modalities; Dry needling;  Therapeutic activities     Subjective Comments:  Pt reports moderate low back pain at start of visit; per

## 2023-03-24 ENCOUNTER — HOSPITAL ENCOUNTER (OUTPATIENT)
Dept: PHYSICAL THERAPY | Age: 68
Setting detail: RECURRING SERIES
End: 2023-03-24
Payer: MEDICARE

## 2023-03-24 NOTE — PROGRESS NOTES
Physical Therapy  [address] [place] [address]  Date: 3/24/2023    Patient called and canceled appointment due to family emergency.       Katie Perez DPT

## 2023-03-29 ENCOUNTER — HOSPITAL ENCOUNTER (OUTPATIENT)
Dept: PHYSICAL THERAPY | Age: 68
Setting detail: RECURRING SERIES
Discharge: HOME | End: 2023-04-01
Payer: MEDICARE

## 2023-03-29 PROCEDURE — 97110 THERAPEUTIC EXERCISES: CPT

## 2023-03-29 PROCEDURE — 97140 MANUAL THERAPY 1/> REGIONS: CPT

## 2023-03-29 ASSESSMENT — PAIN SCALES - GENERAL: PAINLEVEL_OUTOF10: 2

## 2023-03-29 NOTE — PROGRESS NOTES
Stairs in [place] x 1     [place] [facility] access code: [email or web address]    Time spent with patient reviewing proper muscle recruitment and technique with exercises. MANUAL THERAPY: (25 minutes):   Joint mobilization and Soft tissue mobilization was utilized and necessary because of the patient's restricted joint motion, painful spasm, loss of articular motion, and restricted motion of soft tissue. - Joint mobs: Patellar mobs all planes grade II/III. - Soft tissue mobilization: Distal quad, hamstring, gastroc/soleus, scar mobs. MODALITIES: (0 minutes):        Pt declined ice stating she will ice at home. HEP: As above; handouts given to patient for all exercises. Treatment/Session Summary:    Treatment Assessment:   Patient demonstrated initial stiffness with ROM, but showed improved ROM with treatment. Patient's HEP was reviewed and again reminded patient of the need to perform daily and consistently. She gave verbal agreement. Patient was able to go step over step up and down a flight of stairs. Patient would benefit from continued progression of ROM and strength and balance to progress functionally. Communication/Consultation:   None today. Equipment provided today:  None  Recommendations/Intent for next treatment session: Next visit will focus on pain/swelling control, improve right knee ROM, improve RLE strength, progress gait/balance/functional mobility to tolerance.     Total Treatment Billable Duration:  55 minutes  Time In: 1330  Time Out: 130 [facility], PT       Charge Capture  }Post Session Pain  PT Visit [facility] Portal  MD Guidelines  Scanned Media  Benefits  MyChart    Future Appointments   Date Time Provider Catherine Roa   3/31/2023 12:30 PM Adelaida De La Torre PT [facility]   4/3/2023 12:30 PM Adelaida De La Torre PT [facility]   4/4/2023 10:45 AM FLY Daly - CNP [facility]   4/5/2023 12:30 PM Adelaida De La Torre PT [facility]   8/23/2023  9:45 AM Agusto Tubbs

## 2023-03-31 ENCOUNTER — HOSPITAL ENCOUNTER (OUTPATIENT)
Dept: PHYSICAL THERAPY | Age: 68
Setting detail: RECURRING SERIES
End: 2023-03-31
Payer: MEDICARE

## 2023-03-31 PROCEDURE — 97140 MANUAL THERAPY 1/> REGIONS: CPT

## 2023-03-31 PROCEDURE — 97110 THERAPEUTIC EXERCISES: CPT

## 2023-03-31 ASSESSMENT — PAIN SCALES - GENERAL: PAINLEVEL_OUTOF10: 2

## 2023-03-31 NOTE — PROGRESS NOTES
Ousmane Hooks  : 1955  Primary: [facility] (Medicare Managed)  Secondary:  [address] @ [address] [place] [address]  Phone: [phone]  Fax: [phone] Plan Frequency: 2-3 visit per week for 8 weeks    Plan of Care/Certification Expiration Date: 04/10/23 (Start of  [address] 2023)      PT Visit Info:  Plan Frequency: 2-3 visit per week for 8 weeks  Plan of Care/Certification Expiration Date: 04/10/23 (Start of  [address] 2023)      Visit Count:  11    OUTPATIENT PHYSICAL THERAPY:OP NOTE TYPE: Treatment Note 3/31/2023       Episode  }Appt Desk             Treatment Diagnosis:  Aftercare following joint replacement surgery (Z47.1)  Presence of artificial right knee joint (Z96.651)  Right knee pain (M25.561)  Difficulty walking, not else where classified (R26.2)  Medical/Referring Diagnosis:  S/P total knee arthroplasty, right [U15.160]  Referring Physician:  JENNIFER Morgan MD Orders:  PT Eval and Treat  Date of Onset:  Onset Date: 23 (s/p right total knee arthroplasty)     Allergies:   Benazepril and Erythromycin  Restrictions/Precautions:  Restrictions/Precautions: Weight Bearing  Right Lower Extremity Weight Bearing: Weight Bearing As Tolerated  Left Lower Extremity Weight Bearing: Weight Bearing As Tolerated     Interventions Planned (Treatment may consist of any combination of the following):    Current Treatment Recommendations: Strengthening; ROM; Balance training; Functional mobility training; Transfer training; ADL/Self-care training; IADL training; Endurance training; Gait training; Stair training; Neuromuscular re-education; Manual; Pain management; Home exercise program; Safety education & training; Patient/Caregiver education & training; Equipment evaluation, education, & procurement; Modalities; Dry needling;  Therapeutic activities     Subjective Comments:  Pt reports low back symptoms still well controlled bar x5    [employer] access code: [email or web address]    Time spent with patient reviewing proper muscle recruitment and technique with exercises. MANUAL THERAPY: (15 minutes):   Joint mobilization and Soft tissue mobilization was utilized and necessary because of the patient's restricted joint motion, painful spasm, loss of articular motion, and restricted motion of soft tissue. - Joint mobs: Patellar mobs all planes grade II/III. - Soft tissue mobilization: Distal quad, hamstring, gastroc/soleus, scar mobs. MODALITIES: (0 minutes):        Pt declined ice stating she will ice at home. HEP: As above; handouts given to patient for all exercises. Treatment/Session Summary:    Treatment Assessment:  Excellent tolerance to exercise program today and no reported back pain during or after visit; mild fatigue reported in right knee at end of session; Discussed continued focus on flexion ROM exercises at home to promote further gains. Pt verbalized understanding. Communication/Consultation:   None today. Equipment provided today:  None  Recommendations/Intent for next treatment session: Next visit will focus on pain/swelling control, improve right knee ROM, improve RLE strength, progress gait/balance/functional mobility to tolerance.     Total Treatment Billable Duration:  55 minutes  Time In: 2323  Time Out: 1329    David Johnson, PT       Charge Capture  }Post Session Pain  PT Visit Info  [employer] Portal  MD Guidelines  Scanned Media  Benefits  MyChart    Future Appointments   Date Time Provider Catherine Roa   4/3/2023 12:30 PM David Johnson, PT [facility]   4/4/2023 10:45 AM FLY Mcclain - CNP [facility]   4/5/2023 12:30 PM David Johnson, PT [facility]   8/23/2023  9:45 AM MD THERON Downing [facility]

## 2023-04-03 ENCOUNTER — HOSPITAL ENCOUNTER (OUTPATIENT)
Dept: PHYSICAL THERAPY | Age: 68
Setting detail: RECURRING SERIES
Discharge: HOME | End: 2023-04-06
Payer: MEDICARE

## 2023-04-03 PROCEDURE — 97140 MANUAL THERAPY 1/> REGIONS: CPT

## 2023-04-03 PROCEDURE — 97110 THERAPEUTIC EXERCISES: CPT

## 2023-04-03 ASSESSMENT — PAIN SCALES - GENERAL: PAINLEVEL_OUTOF10: 2

## 2023-04-03 NOTE — PROGRESS NOTES
inch Bilateral UE Support    Lateral stepping  Length of bar x5 Length of bar x5   Static balance   FT, eyes closed, 2x15 sec    Tandem, floor, 2x15 sec         [employer] access code: [email or web address]    Time spent with patient reviewing proper muscle recruitment and technique with exercises. MANUAL THERAPY: (12 minutes):   Joint mobilization and Soft tissue mobilization was utilized and necessary because of the patient's restricted joint motion, painful spasm, loss of articular motion, and restricted motion of soft tissue. - Joint mobs: Patellar mobs all planes grade II/III. - Soft tissue mobilization: Distal quad, hamstring, gastroc/soleus, scar mobs. MODALITIES: (0 minutes):        Pt declined ice stating she will ice at home. HEP: As above; handouts given to patient for all exercises. Treatment/Session Summary:    Treatment Assessment:  Good tolerance to added static balance exercises today to improve stability. Continued progression of knee flexion ROM demonstrated today. Communication/Consultation:   None today. Equipment provided today:  None  Recommendations/Intent for next treatment session: Next visit will focus on pain/swelling control, improve right knee ROM, improve RLE strength, progress gait/balance/functional mobility to tolerance.     Total Treatment Billable Duration:  55 minutes  Time In: 9408  Time Out: 1330    Lionel Buerger, PT       Charge Capture  }Post Session Pain  PT Visit Info  [employer] Portal  MD Guidelines  Scanned Media  Benefits  MyChart    Future Appointments   Date Time Provider Catherine Roa   4/4/2023 10:45 AM FLY Esteves - CNP [facility]   4/5/2023 12:30 PM Lionel Buerger, PT [facility]   8/23/2023  9:45 AM MD THERON Martínez AMB

## 2023-04-04 ENCOUNTER — OFFICE VISIT (OUTPATIENT)
Dept: ORTHOPEDIC SURGERY | Age: 68
End: 2023-04-04
Payer: MEDICARE

## 2023-04-04 DIAGNOSIS — M48.061 STENOSIS OF LATERAL RECESS OF LUMBAR SPINE: Primary | ICD-10-CM

## 2023-04-04 DIAGNOSIS — M48.062 SPINAL STENOSIS, LUMBAR REGION WITH NEUROGENIC CLAUDICATION: ICD-10-CM

## 2023-04-04 PROCEDURE — G8428 CUR MEDS NOT DOCUMENT: HCPCS | Performed by: NURSE PRACTITIONER

## 2023-04-04 PROCEDURE — 1123F ACP DISCUSS/DSCN MKR DOCD: CPT | Performed by: NURSE PRACTITIONER

## 2023-04-04 PROCEDURE — 1090F PRES/ABSN URINE INCON ASSESS: CPT | Performed by: NURSE PRACTITIONER

## 2023-04-04 PROCEDURE — 1036F TOBACCO NON-USER: CPT | Performed by: NURSE PRACTITIONER

## 2023-04-04 PROCEDURE — G8417 CALC BMI ABV UP PARAM F/U: HCPCS | Performed by: NURSE PRACTITIONER

## 2023-04-04 PROCEDURE — 3017F COLORECTAL CA SCREEN DOC REV: CPT | Performed by: NURSE PRACTITIONER

## 2023-04-04 PROCEDURE — 99213 OFFICE O/P EST LOW 20 MIN: CPT | Performed by: NURSE PRACTITIONER

## 2023-04-04 PROCEDURE — G8399 PT W/DXA RESULTS DOCUMENT: HCPCS | Performed by: NURSE PRACTITIONER

## 2023-04-04 NOTE — PROGRESS NOTES
bedtime, Disp: , Rfl:   Past Medical History:   Diagnosis Date    Allergic rhinitis     Anxiety     Arthritis     Autoimmune hepatitis ([facility] [address]     Pt states diagnosis not accurate- was thought to be hepatitis but was a bile duct that had been cut during surgery    Chronic pain syndrome     Depression     Ear problems     Elevated liver enzymes     resolved per pt    Facet arthritis of lumbar region     GERD (gastroesophageal reflux disease)     Hearing reduced     Hyperlipemia     managed with medication    Hypertension     managed with medication    Iron deficiency anemia     hx    Menopausal disorder     Metabolic syndrome     Osteoporosis     Restrictive lung disease     PRN inhaler-last use 9/2022    Thickened endometrium      Tobacco:  reports that she has never smoked. She has never used smokeless tobacco.  Alcohol:   Social History     Substance and Sexual Activity   Alcohol Use No          Radiographic Studies:       Assessment/Plan:        ICD-10-CM    1. Stenosis of lateral recess of lumbar spine  M48.061       2. Spinal stenosis, lumbar region with neurogenic claudication  M48.062            Patient has done very well with injections. We discussed she can call back when she is in need of reinjection to continue her home exercise program.    -The patient will be treated observantly with self directed symptomatic care. Instructions were given to follow up if there is any neurologic or functional decline. No orders of the defined types were placed in this encounter. No orders of the defined types were placed in this encounter. 3 This is stable chronic illness/condition      Return if symptoms worsen or fail to improve. FLY Betancourt - CNP  04/04/23      Elements of this note were created using speech recognition software. As such, errors of speech recognition may be present.

## 2023-04-05 ENCOUNTER — HOSPITAL ENCOUNTER (OUTPATIENT)
Dept: PHYSICAL THERAPY | Age: 68
Setting detail: RECURRING SERIES
Discharge: HOME | End: 2023-04-08
Payer: MEDICARE

## 2023-04-05 PROCEDURE — 97140 MANUAL THERAPY 1/> REGIONS: CPT

## 2023-04-05 PROCEDURE — 97110 THERAPEUTIC EXERCISES: CPT

## 2023-04-05 ASSESSMENT — PAIN SCALES - GENERAL: PAINLEVEL_OUTOF10: 3

## 2023-04-05 NOTE — PROGRESS NOTES
Lateral stepping Length of bar x5 Length of bar x5 Length of bar x5   Static balance --  FT, eyes closed, 2x15 sec    Tandem, floor, 2x15 sec   [employer] access code: [email or web address]    Time spent with patient reviewing proper muscle recruitment and technique with exercises. MANUAL THERAPY: (10 minutes):   Joint mobilization and Soft tissue mobilization was utilized and necessary because of the patient's restricted joint motion, painful spasm, loss of articular motion, and restricted motion of soft tissue. - Joint mobs: Patellar mobs all planes grade II/III. - Soft tissue mobilization: Distal quad, hamstring, gastroc/soleus, scar mobs. MODALITIES: (0 minutes):        Pt declined ice stating she will ice at home. HEP: As above; handouts given to patient for all exercises. Treatment/Session Summary:    Treatment Assessment:  Verbal cues during ambulation to prevent right shoulder lean druing RLE stance phase of gait; good tolerance to exercises and manual therapy performed. Communication/Consultation:   None today. Equipment provided today:  None  Recommendations/Intent for next treatment session: Next visit will focus on pain/swelling control, improve right knee ROM, improve RLE strength, progress gait/balance/functional mobility to tolerance.     Total Treatment Billable Duration:  53 minutes  Time In: 2838  Time Out: 1329    Ulanda Landau, WALDEMAR       Charge Capture  }Post Session Pain  PT Visit Info  [facility] Portal  MD Guidelines  Scanned Media  Benefits  MyChart    Future Appointments   Date Time Provider Catherine Roa   8/23/2023  9:45 AM Dominic Byrd MD [facility]

## 2023-04-05 NOTE — THERAPY RECERTIFICATION
function. - goal met 3/16/2023  Pt will demonstrate independent supine to sit transfer using log roll technique in order to improve independence and safety with functional transfers. - goal met 3/16/2023  Pt will demonstrate right knee PROM 0-115 degrees or greater in order to progress towards independent functional transfers including sit to stand.- Goal met 3/16/2023  Discharge Goals: Time Frame: 4/5/2023 to 5/17/2023  Pt will report worst pain 3/10 or less with prolonged standing/walking 15 mins or greater in order to return to unrestricted community distance ambulation. - goal met 4/5/2023  Pt will demonstrate right knee AROM 0-120 degrees or greater in order to return to independent stair navigation. - ongoing  Pt will demonstrate 5 times sit to stand test time of 18 sec or less in order to demonstrate gross functional strength improvement. - ongoing  KOOS score of 5/28 or less in order to demonstrate overall functional improvement. - Goal met 4/5/2023  NEW GOAL 4/5/2023: Pt will demonstrate ability to navigate 1 flight of steps using reciprocal pattern and x1 handrail in order to safely navigate steps at home. - ongoing         Outcome Measure: Tool Used: Knee injury and Osteoarthritis Outcome Score for Joint Replacement (KOOS, JR)  Score:  Initial:  9/28 Most Recent: TBD, 2/28   Interpretation of Score: The KOOS, JR contains 7 items from the original KOOS survey. Items are coded from 0 to 4, none to extreme respectively. Oneal Angelo is scored by summing the raw response (range 0-28) and then converting it to an interval score using the table provided below. The interval score ranges from 0 to 100 where 0 represents total knee disability and 100 represents perfect knee health.     Medical Necessity:   > Patient is expected to demonstrate progress in strength, range of motion, balance, coordination, and functional technique to increase independence with community distance ambulation, functional transfers, ADL

## 2023-04-14 ENCOUNTER — HOSPITAL ENCOUNTER (OUTPATIENT)
Dept: PHYSICAL THERAPY | Age: 68
Setting detail: RECURRING SERIES
End: 2023-04-14
Payer: MEDICARE

## 2023-04-18 ENCOUNTER — HOSPITAL ENCOUNTER (OUTPATIENT)
Dept: PHYSICAL THERAPY | Age: 68
Setting detail: RECURRING SERIES
End: 2023-04-18
Payer: MEDICARE

## 2023-04-20 ENCOUNTER — HOSPITAL ENCOUNTER (OUTPATIENT)
Dept: PHYSICAL THERAPY | Age: 68
Setting detail: RECURRING SERIES
End: 2023-04-20
Payer: MEDICARE

## 2023-04-28 ENCOUNTER — HOSPITAL ENCOUNTER (OUTPATIENT)
Dept: PHYSICAL THERAPY | Age: 68
Setting detail: RECURRING SERIES
End: 2023-04-28
Payer: MEDICARE

## 2023-05-02 ENCOUNTER — HOSPITAL ENCOUNTER (OUTPATIENT)
Dept: PHYSICAL THERAPY | Age: 68
Setting detail: RECURRING SERIES
Discharge: HOME | End: 2023-05-05
Payer: MEDICARE

## 2023-05-02 PROCEDURE — 97140 MANUAL THERAPY 1/> REGIONS: CPT

## 2023-05-02 PROCEDURE — 97110 THERAPEUTIC EXERCISES: CPT

## 2023-05-02 ASSESSMENT — PAIN SCALES - GENERAL: PAINLEVEL_OUTOF10: 3

## 2023-05-02 NOTE — PROGRESS NOTES
Latasha Dash  : 1955  Primary: [facility] (Medicare Managed)  Secondary:  [address] @ [address] [place] [address]  Phone: [phone]  Fax: [phone] Plan Frequency: 1-2 visit per week for 6 weeks    Plan of Care/Certification Expiration Date: 23 (Start of [address] 2023)      PT Visit Info:  Plan Frequency: 1-2 visit per week for 6 weeks  Plan of Care/Certification Expiration Date: 23 (Start of [address] 2023)      Visit Count:  15    OUTPATIENT PHYSICAL THERAPY:OP NOTE TYPE: Treatment Note 2023       Episode  }Appt Desk             Treatment Diagnosis:  Aftercare following joint replacement surgery (Z47.1)  Presence of artificial right knee joint (Z96.651)  Right knee pain (M25.561)  Difficulty walking, not else where classified (R26.2)  Medical/Referring Diagnosis:  S/P total knee arthroplasty, right [U44.871]  Referring Physician:  JENNIFER Julian MD Orders:  PT Eval and Treat  Date of Onset:  Onset Date: 23 (s/p right total knee arthroplasty)     Allergies:   Benazepril and Erythromycin  Restrictions/Precautions:  Restrictions/Precautions: Weight Bearing  Right Lower Extremity Weight Bearing: Weight Bearing As Tolerated  Left Lower Extremity Weight Bearing: Weight Bearing As Tolerated     Interventions Planned (Treatment may consist of any combination of the following):    Current Treatment Recommendations: Strengthening; ROM; Balance training; Functional mobility training; Transfer training; ADL/Self-care training; IADL training; Endurance training; Gait training; Stair training; Neuromuscular re-education; Manual; Pain management; Home exercise program; Safety education & training; Patient/Caregiver education & training; Equipment evaluation, education, & procurement; Modalities; Dry needling;  Therapeutic activities     Subjective Comments:   Patient reports that she can tell she has missed therapy,

## 2023-05-04 ENCOUNTER — HOSPITAL ENCOUNTER (OUTPATIENT)
Dept: PHYSICAL THERAPY | Age: 68
Setting detail: RECURRING SERIES
Discharge: HOME | End: 2023-05-07
Payer: MEDICARE

## 2023-05-04 PROCEDURE — 97110 THERAPEUTIC EXERCISES: CPT

## 2023-05-04 ASSESSMENT — PAIN SCALES - GENERAL: PAINLEVEL_OUTOF10: 2

## 2023-05-10 ENCOUNTER — HOSPITAL ENCOUNTER (OUTPATIENT)
Dept: PHYSICAL THERAPY | Age: 68
Setting detail: RECURRING SERIES
Discharge: HOME | End: 2023-05-13
Payer: MEDICARE

## 2023-05-10 PROCEDURE — 97110 THERAPEUTIC EXERCISES: CPT

## 2023-05-10 ASSESSMENT — PAIN SCALES - GENERAL: PAINLEVEL_OUTOF10: 2

## 2023-05-10 NOTE — PROGRESS NOTES
Radha Coombs  : 1955  Primary: [facility] (Medicare Managed)  Secondary:  [address] @ [address] [place] [address]  Phone: [phone]  Fax: [phone] Plan Frequency: 1-2 visit per week for 6 weeks    Plan of Care/Certification Expiration Date: 23 (Start of  [address] 2023)      PT Visit Info:  Plan Frequency: 1-2 visit per week for 6 weeks  Plan of Care/Certification Expiration Date: 23 (Start of  [address] 2023)      Visit Count:  17    OUTPATIENT PHYSICAL THERAPY:OP NOTE TYPE: Treatment Note 5/10/2023       Episode  }Appt Desk             Treatment Diagnosis:  Aftercare following joint replacement surgery (Z47.1)  Presence of artificial right knee joint (Z96.651)  Right knee pain (M25.561)  Difficulty walking, not else where classified (R26.2)  Medical/Referring Diagnosis:  S/P total knee arthroplasty, right [T42.006]  Referring Physician:  JENNIFER Mansfield MD Orders:  PT Eval and Treat  Date of Onset:  Onset Date: 23 (s/p right total knee arthroplasty)     Allergies:   Benazepril and Erythromycin  Restrictions/Precautions:  Restrictions/Precautions: Weight Bearing  Right Lower Extremity Weight Bearing: Weight Bearing As Tolerated  Left Lower Extremity Weight Bearing: Weight Bearing As Tolerated     Interventions Planned (Treatment may consist of any combination of the following):    Current Treatment Recommendations: Strengthening; ROM; Balance training; Functional mobility training; Transfer training; ADL/Self-care training; IADL training; Endurance training; Gait training; Stair training; Neuromuscular re-education; Manual; Pain management; Home exercise program; Safety education & training; Patient/Caregiver education & training; Equipment evaluation, education, & procurement; Modalities; Dry needling;  Therapeutic activities     Subjective Comments:   Patient reports that she saw her MD this morning and per

## 2023-05-11 ENCOUNTER — HOSPITAL ENCOUNTER (OUTPATIENT)
Dept: PHYSICAL THERAPY | Age: 68
Setting detail: RECURRING SERIES
Discharge: HOME | End: 2023-05-14
Payer: MEDICARE

## 2023-05-11 PROCEDURE — 97110 THERAPEUTIC EXERCISES: CPT

## 2023-05-11 ASSESSMENT — PAIN SCALES - GENERAL: PAINLEVEL_OUTOF10: 3

## 2023-05-16 ENCOUNTER — HOSPITAL ENCOUNTER (OUTPATIENT)
Dept: PHYSICAL THERAPY | Age: 68
Setting detail: RECURRING SERIES
Discharge: HOME | End: 2023-05-19
Payer: MEDICARE

## 2023-05-16 PROCEDURE — 97110 THERAPEUTIC EXERCISES: CPT

## 2023-05-16 ASSESSMENT — PAIN SCALES - GENERAL: PAINLEVEL_OUTOF10: 3

## 2023-05-16 NOTE — PROGRESS NOTES
Vignesh Cardoso  : 1955  Primary: [facility] [facility] (Medicare Managed)  Secondary:  [address] @ [address] [place] [address]  Phone: [phone]  Fax: [phone] Plan Frequency: 1-2 visit per week for 6 weeks    Plan of Care/Certification Expiration Date: 23 (Start of  [address])      PT Visit Info:  Plan Frequency: 1-2 visit per week for 6 weeks  Plan of Care/Certification Expiration Date: 23 (Start of  [address] 20[address])      Visit Count:  19    OUTPATIENT PHYSICAL THERAPY:OP NOTE TYPE: Treatment Note 2023       Episode  }Appt Desk             Treatment Diagnosis:  Aftercare following joint replacement surgery (Z47.1)  Presence of artificial right knee joint (Z96.651)  Right knee pain (M25.561)  Difficulty walking, not else where classified (R26.2)  Medical/Referring Diagnosis:  S/P total knee arthroplasty, right [I73.203]  Referring Physician:  JENNIFER Hayward MD Orders:  PT Eval and Treat  Date of Onset:  Onset Date: 23 (s/p right total knee arthroplasty)     Allergies:   Benazepril and Erythromycin  Restrictions/Precautions:  Restrictions/Precautions: Weight Bearing  Right Lower Extremity Weight Bearing: Weight Bearing As Tolerated  Left Lower Extremity Weight Bearing: Weight Bearing As Tolerated     Interventions Planned (Treatment may consist of any combination of the following):    Current Treatment Recommendations: Strengthening; ROM; Balance training; Functional mobility training; Transfer training; ADL/Self-care training; IADL training; Endurance training; Gait training; Stair training; Neuromuscular re-education; Manual; Pain management; Home exercise program; Safety education & training; Patient/Caregiver education & training; Equipment evaluation, education, & procurement; Modalities; Dry needling;  Therapeutic activities     Subjective Comments:   Patient reports that she was sitting talking to a family

## 2023-05-17 ENCOUNTER — HOSPITAL ENCOUNTER (OUTPATIENT)
Dept: PHYSICAL THERAPY | Age: 68
Setting detail: RECURRING SERIES
End: 2023-05-17
Payer: MEDICARE

## 2023-05-18 NOTE — THERAPY DISCHARGE
Ousmane Hooks  : 1955  Primary: [facility] (Medicare Managed)  Secondary:  [address] @ [address] [facility] [address]  Phone: [phone]  Fax: [phone] Plan Frequency: Discharge to HEP. Plan of Care/Certification Expiration Date: 23 (Start of [address] 2023)      PT Visit Info:  Plan Frequency: Discharge to HEP. Plan of Care/Certification Expiration Date: 23 (Start of POC 2023)      Visit Count:  23                OUTPATIENT PHYSICAL THERAPY:             OP NOTE TYPE: Discontinuation Summary 2023               Episode (s/p Right TKA) Appt Desk         Treatment Diagnosis: Aftercare following joint replacement surgery (Z47.1)  Presence of artificial right knee joint (Z96.651)  Right knee pain (M25.561)  Difficulty walking, not else where classified (R26.2)  Medical/Referring Diagnosis:  S/P total knee arthroplasty, right [R77.670]  Referring Physician:  JENNIFER Morgan MD Orders:  PT Eval and Treat  Return MD Appt:  2023  Date of Onset:  Onset Date: 23 (s/p right total knee arthroplasty)      Allergies:  Benazepril and Erythromycin  Restrictions/Precautions:             Medications Last Reviewed:  2023      OBJECTIVE   Observation/Postural and Gait Assessment: Gait: Mild antalgic pattern without use of AD, right lateral trunk lean observed during RLE stance phase that is correctable with verbal cues; Incision: normal healing, open to air, no signs of infection. Anthropometric measurements (cm) Left Right   Knee joint line 46 cm 45 cm     Palpation: Mild tenderness to palpation noted distal quad, hamstring and gastroc. ROM:     AROM(PROM) Left Right   Knee flexion 118° 112(119)°   Knee extension 0° 0 (-1)°     Passive Accessory Mobility Testing: Patella mild hypomobility most notable in superior direction.     Strength:       Manual Muscle Test (out of 5) Left Right   Knee

## 2023-06-30 ENCOUNTER — OFFICE VISIT (OUTPATIENT)
Dept: ORTHOPEDIC SURGERY | Age: 68
End: 2023-06-30

## 2023-06-30 DIAGNOSIS — M54.16 LUMBAR RADICULOPATHY: Primary | ICD-10-CM

## 2023-06-30 RX ORDER — TRIAMCINOLONE ACETONIDE 40 MG/ML
160 INJECTION, SUSPENSION INTRA-ARTICULAR; INTRAMUSCULAR ONCE
Status: COMPLETED | OUTPATIENT
Start: 2023-06-30 | End: 2023-06-30

## 2023-06-30 RX ADMIN — TRIAMCINOLONE ACETONIDE 160 MG: 40 INJECTION, SUSPENSION INTRA-ARTICULAR; INTRAMUSCULAR at 10:35

## 2023-07-31 ENCOUNTER — TELEPHONE (OUTPATIENT)
Dept: ORTHOPEDIC SURGERY | Age: 68
End: 2023-07-31

## 2023-07-31 DIAGNOSIS — M48.062 SPINAL STENOSIS OF LUMBAR REGION WITH NEUROGENIC CLAUDICATION: ICD-10-CM

## 2023-07-31 DIAGNOSIS — M48.061 STENOSIS OF LATERAL RECESS OF LUMBAR SPINE: ICD-10-CM

## 2023-07-31 DIAGNOSIS — M54.16 LUMBAR RADICULOPATHY: Primary | ICD-10-CM

## 2023-08-10 ENCOUNTER — OFFICE VISIT (OUTPATIENT)
Dept: ORTHOPEDIC SURGERY | Age: 68
End: 2023-08-10

## 2023-08-10 DIAGNOSIS — M54.16 LUMBAR RADICULOPATHY: Primary | ICD-10-CM

## 2023-08-10 RX ORDER — TRIAMCINOLONE ACETONIDE 40 MG/ML
160 INJECTION, SUSPENSION INTRA-ARTICULAR; INTRAMUSCULAR ONCE
Status: COMPLETED | OUTPATIENT
Start: 2023-08-10 | End: 2023-08-10

## 2023-08-10 RX ADMIN — TRIAMCINOLONE ACETONIDE 160 MG: 40 INJECTION, SUSPENSION INTRA-ARTICULAR; INTRAMUSCULAR at 15:50

## 2023-08-10 NOTE — PROGRESS NOTES
Date: 08/10/23   Name: Maria E Johnston    Pre-Procedural Diagnosis:    Diagnosis Orders   1. Lumbar radiculopathy  FL NERVE BLOCK LUMBOSACRAL 1ST    FL NERVE BLOCK LUMBOSACRAL EACH ADD    triamcinolone acetonide (KENALOG-40) injection 160 mg          Procedure: Selective Nerve Root Blocks (Transforaminal) - Multiple Level    Precautions:  [facility] Precautions spine injections: None. Patient denies any prior sensitivity to steroid, local anesthetic, contrast dye, iodine or shellfish. The procedure was discussed at length with the patient and informed consent was signed. The patient was placed in a prone position on the fluoroscopy table and the skin was prepped and draped in a routine sterile fashion. The areas to be injected were each anesthetized with approximately 5 cc of 1% Lidocaine. A 22-gauge 5 inch spinal needle was carefully advanced under fluoroscopic guidance to the left L5 transforaminal space and subsequently the left S1 transforaminal space. At this time 0.25 cc of omnipaque administered. . Once proper placement was confirmed, 2 cc of 0.25% Marcaine and 80 mg of Kenalog were injected through the spinal needle at each site. Fluoroscopic guidance was used intermittently over a 10-minute period to insure proper needle placement and patient safety. A hard copy of the fluoroscopic  images has been placed in the patient's chart. The patient was monitored after the procedure and discharged home in stable fashion. A total of 4 cc of Kenalog were administered during this procedure.     Resume Meds:  N/A    Bud Granados MD  08/10/23

## 2023-09-27 ENCOUNTER — OFFICE VISIT (OUTPATIENT)
Dept: ORTHOPEDIC SURGERY | Age: 68
End: 2023-09-27
Payer: MEDICARE

## 2023-09-27 DIAGNOSIS — Z09 FOLLOW-UP EXAMINATION: Primary | ICD-10-CM

## 2023-09-27 DIAGNOSIS — Z96.651 HISTORY OF TOTAL KNEE ARTHROPLASTY, RIGHT: ICD-10-CM

## 2023-09-27 PROCEDURE — 99213 OFFICE O/P EST LOW 20 MIN: CPT | Performed by: PHYSICIAN ASSISTANT

## 2023-09-27 PROCEDURE — G8417 CALC BMI ABV UP PARAM F/U: HCPCS | Performed by: PHYSICIAN ASSISTANT

## 2023-09-27 PROCEDURE — 1090F PRES/ABSN URINE INCON ASSESS: CPT | Performed by: PHYSICIAN ASSISTANT

## 2023-09-27 PROCEDURE — 1123F ACP DISCUSS/DSCN MKR DOCD: CPT | Performed by: PHYSICIAN ASSISTANT

## 2023-09-27 PROCEDURE — G8399 PT W/DXA RESULTS DOCUMENT: HCPCS | Performed by: PHYSICIAN ASSISTANT

## 2023-09-27 PROCEDURE — G8428 CUR MEDS NOT DOCUMENT: HCPCS | Performed by: PHYSICIAN ASSISTANT

## 2023-09-27 NOTE — PROGRESS NOTES
Name: Keyla Esposito  YOB: 1955  Gender: female  MRN: [id number]    Post-Op RIGHT TKA: 6 months    HPI: This patient returns now six months out from surgery. The patient is happy with their return to function. They are experiencing minimal discomfort. They have weaned away from the cane. Interval medical change is noted on the patient history form which is updated today. Past Medical History: Allergies: Allergies   Allergen Reactions    Benazepril Other (See Comments)    Erythromycin Other (See Comments)     Other reaction(s): Abdominal pain-I  Other reaction(s): Abdominal pain-I  GI upset       Medications:  Current Outpatient Medications   Medication Sig    traMADol (ULTRAM) 50 MG tablet     methocarbamol (ROBAXIN-750) 750 MG tablet Take 1 tablet by mouth every 6 hours as needed for spasms. aspirin 81 MG EC tablet Take 1 tablet by mouth 2 times daily    celecoxib (CELEBREX) 200 MG capsule Take 1 capsule by mouth daily    methocarbamol (ROBAXIN) 750 MG tablet Take 1 tablet by mouth 4 times daily as needed (muscle spasms)    promethazine (PHENERGAN) 25 MG tablet Take 1 tablet by mouth every 6 hours as needed for Nausea    docusate sodium (COLACE) 100 MG capsule Take 100 mg by mouth 2 times daily. Ferrous Sulfate Dried (FERROUS SULFATE IRON PO) Take 1 tablet by mouth daily. 65 mg    Calcium Citrate-Vitamin D (CALCIUM CITRATE + D3 PO) Take 1 tablet by mouth daily. Ascorbic Acid (VITAMIN C) 500 MG CAPS Take 1 tablet by mouth daily. acetaminophen (TYLENOL) 500 MG tablet Take 1,000 mg by mouth every 6 hours as needed (breakthrough pain)    LORazepam (ATIVAN) 1 MG tablet Take 1 mg by mouth 3 times daily as needed for Anxiety.     linaclotide (LINZESS) 145 MCG capsule Take 145 mcg by mouth every morning (before breakfast)    albuterol sulfate  (90 Base) MCG/ACT inhaler Inhale 1 puff into the lungs every 4 hours as needed for Wheezing    amLODIPine (NORVASC) 10 MG tablet Take

## 2023-10-25 ENCOUNTER — TELEPHONE (OUTPATIENT)
Dept: ORTHOPEDIC SURGERY | Age: 68
End: 2023-10-25

## 2023-11-13 ENCOUNTER — INITIAL CONSULT (OUTPATIENT)
Age: 68
End: 2023-11-13
Payer: MEDICARE

## 2023-11-13 VITALS
HEART RATE: 90 BPM | BODY MASS INDEX: 42.4 KG/M2 | DIASTOLIC BLOOD PRESSURE: 90 MMHG | WEIGHT: 230.4 LBS | HEIGHT: 62 IN | SYSTOLIC BLOOD PRESSURE: 164 MMHG

## 2023-11-13 DIAGNOSIS — R06.09 DYSPNEA ON EXERTION: Primary | ICD-10-CM

## 2023-11-13 DIAGNOSIS — I10 ESSENTIAL HYPERTENSION: ICD-10-CM

## 2023-11-13 DIAGNOSIS — E78.2 MIXED HYPERLIPIDEMIA: ICD-10-CM

## 2023-11-13 DIAGNOSIS — R07.89 CHEST TIGHTNESS: ICD-10-CM

## 2023-11-13 DIAGNOSIS — R94.31 ABNORMAL EKG: ICD-10-CM

## 2023-11-13 PROCEDURE — 3080F DIAST BP >= 90 MM HG: CPT | Performed by: INTERNAL MEDICINE

## 2023-11-13 PROCEDURE — 93000 ELECTROCARDIOGRAM COMPLETE: CPT | Performed by: INTERNAL MEDICINE

## 2023-11-13 PROCEDURE — 3077F SYST BP >= 140 MM HG: CPT | Performed by: INTERNAL MEDICINE

## 2023-11-13 PROCEDURE — 1036F TOBACCO NON-USER: CPT | Performed by: INTERNAL MEDICINE

## 2023-11-13 PROCEDURE — 1123F ACP DISCUSS/DSCN MKR DOCD: CPT | Performed by: INTERNAL MEDICINE

## 2023-11-13 PROCEDURE — G8484 FLU IMMUNIZE NO ADMIN: HCPCS | Performed by: INTERNAL MEDICINE

## 2023-11-13 PROCEDURE — G8399 PT W/DXA RESULTS DOCUMENT: HCPCS | Performed by: INTERNAL MEDICINE

## 2023-11-13 PROCEDURE — 99205 OFFICE O/P NEW HI 60 MIN: CPT | Performed by: INTERNAL MEDICINE

## 2023-11-13 PROCEDURE — G8427 DOCREV CUR MEDS BY ELIG CLIN: HCPCS | Performed by: INTERNAL MEDICINE

## 2023-11-13 PROCEDURE — 1090F PRES/ABSN URINE INCON ASSESS: CPT | Performed by: INTERNAL MEDICINE

## 2023-11-13 PROCEDURE — 3017F COLORECTAL CA SCREEN DOC REV: CPT | Performed by: INTERNAL MEDICINE

## 2023-11-13 PROCEDURE — G8417 CALC BMI ABV UP PARAM F/U: HCPCS | Performed by: INTERNAL MEDICINE

## 2023-11-13 RX ORDER — CARVEDILOL 12.5 MG/1
12.5 TABLET ORAL 2 TIMES DAILY WITH MEALS
Qty: 180 TABLET | Refills: 3 | Status: SHIPPED | OUTPATIENT
Start: 2023-11-13

## 2023-11-13 RX ORDER — NITROGLYCERIN 0.4 MG/1
TABLET SUBLINGUAL
Qty: 25 TABLET | Refills: 3 | Status: SHIPPED | OUTPATIENT
Start: 2023-11-13

## 2023-11-13 RX ORDER — MELOXICAM 7.5 MG/1
TABLET ORAL
COMMUNITY
Start: 2023-10-10

## 2023-11-13 ASSESSMENT — ENCOUNTER SYMPTOMS
EYE REDNESS: 0
HEMATEMESIS: 0
HOARSE VOICE: 0
HEMATOCHEZIA: 0
HEMOPTYSIS: 0
STRIDOR: 0
WHEEZING: 0
ABDOMINAL PAIN: 0
DOUBLE VISION: 0

## 2023-11-13 NOTE — PROGRESS NOTES
[facility] CARDIOLOGY  [address] [facility] [place]  PHONE: [phone]          23    NAME:  Cait Rockwell  : 1955  MRN: [id number]         SUBJECTIVE:   Cait Rockwell is a 77 y.o. female seen for a visit regarding the following:     Chief Complaint   Patient presents with    Consultation    Hyperlipidemia    Hypertension    Shortness of Breath    Fatigue           HPI:      Cardio problem list:  1. Dyspnea on exertion  2. Hypertension  3. Hyperlipidemia  4. Abnormal EKG  5. Diabetes mellitus with neuropathy  6. Obesity  7. COPD      Dear Umer Granado,  I saw Ms. Manuela Villafuerte who is a pleasant 78-year-old woman in cardiovascular consultation for dyspnea on exertion with known underlying hypertension, hyperlipidemia with an abnormal EKG and underlying type 2 diabetes mellitus with neuropathy, obesity and COPD. Dyspnea on exertion and chest tightness-she has had some mild dyspnea on exertion for a while but she feels that over the past couple months, this has progressed. Described occasional central substernal chest tightness in addition-quite often brought on with exertion and relieved with rest.  No particular relieving factors apart from rest.  She has never been a smoker, denies any significant lower extremity edema orthopnea PND. Denies any significant wheezing in any way. No previous history of pulmonary embolism, no recent surgery or significant immobilization in any way. Has not been bedbound. He understands that her obesity can contribute to her dyspnea but wanted to make sure there was nothing more than this. Hypertension: Pressures are clearly elevated here. She said she will monitor pressures at home but she suspects her home blood pressure readings are also trending upwards. No obvious significant headaches or blurry vision. I will increase her carvedilol dosing today.     Hyperlipidemia-remains on rosuvastatin therapy and denies any excessive

## 2023-11-14 ENCOUNTER — OFFICE VISIT (OUTPATIENT)
Dept: ORTHOPEDIC SURGERY | Age: 68
End: 2023-11-14
Payer: MEDICARE

## 2023-11-14 DIAGNOSIS — M48.062 SPINAL STENOSIS, LUMBAR REGION WITH NEUROGENIC CLAUDICATION: Primary | ICD-10-CM

## 2023-11-14 DIAGNOSIS — M54.16 LUMBAR RADICULOPATHY: ICD-10-CM

## 2023-11-14 DIAGNOSIS — M48.061 STENOSIS OF LATERAL RECESS OF LUMBAR SPINE: ICD-10-CM

## 2023-11-14 PROCEDURE — G8428 CUR MEDS NOT DOCUMENT: HCPCS | Performed by: NURSE PRACTITIONER

## 2023-11-14 PROCEDURE — 1123F ACP DISCUSS/DSCN MKR DOCD: CPT | Performed by: NURSE PRACTITIONER

## 2023-11-14 PROCEDURE — 99214 OFFICE O/P EST MOD 30 MIN: CPT | Performed by: NURSE PRACTITIONER

## 2023-11-14 PROCEDURE — G8399 PT W/DXA RESULTS DOCUMENT: HCPCS | Performed by: NURSE PRACTITIONER

## 2023-11-14 PROCEDURE — G8417 CALC BMI ABV UP PARAM F/U: HCPCS | Performed by: NURSE PRACTITIONER

## 2023-11-14 PROCEDURE — G8484 FLU IMMUNIZE NO ADMIN: HCPCS | Performed by: NURSE PRACTITIONER

## 2023-11-14 PROCEDURE — 3017F COLORECTAL CA SCREEN DOC REV: CPT | Performed by: NURSE PRACTITIONER

## 2023-11-14 PROCEDURE — 1090F PRES/ABSN URINE INCON ASSESS: CPT | Performed by: NURSE PRACTITIONER

## 2023-11-14 PROCEDURE — 1036F TOBACCO NON-USER: CPT | Performed by: NURSE PRACTITIONER

## 2023-11-14 NOTE — PROGRESS NOTES
Name: Baron Baldwin  YOB: 1955  Gender: female  MRN: [id number]    CC: Follow-up left lower back and leg pain    HPI: This is a 77y.o. year old female who has had pain in the left lower back and into the hip and buttock. She had a knee replacement. MRI of the lumbar spine had revealed spinal stenosis that was moderate in nature at L3-4 and L4-5 with significant lateral recess stenosis both at L4-5 and L5-S1. Patient has been undergoing left L5 and S1 selective nerve root blocks under my care with 100% relief. These last approximately 4 months. She is here today for reinjection. Thus far, the patient has tried gabapentin, Robaxin, hydrocodone, Tylenol, home exercises, physical therapy, injections    Patient has completed a home exercise program under my care for 6 weeks, from 4/4/23 to 11/14/23  performing exercises 5-6 times a week. These exercises included: Pelvic tilt, cat and camel, single knee-to-chest, double knee-to-chest, lower trunk rotation, pelvic bridging, prone on elbows, prone on extended arms, standing back extensions, side bend, prone upper extremity exercise, prone lower extremity exercise. Current pain level: 6-7  Activities limited by pain: increased activity              11/14/2023     1:24 PM   AMB PAIN ASSESSMENT   Location of Pain Back   Location Modifiers Left   Severity of Pain 7   Frequency of Pain Intermittent   Limiting Behavior Yes   Result of Injury No   Work-Related Injury No   Are there other pain locations you wish to document? No            Allergies   Allergen Reactions    Benazepril Other (See Comments)    Erythromycin Other (See Comments)     Other reaction(s): Abdominal pain-I  Other reaction(s): Abdominal pain-I  GI upset       Current Outpatient Medications:     meloxicam (MOBIC) 7.5 MG tablet, , Disp: , Rfl:     nitroGLYCERIN (NITROSTAT) 0.4 MG SL tablet, up to max of 3 total doses.  If no relief after 1 dose, call 911., Disp: 25 tablet, Rfl:

## 2023-11-17 ENCOUNTER — TELEPHONE (OUTPATIENT)
Age: 68
End: 2023-11-17

## 2023-11-17 NOTE — TELEPHONE ENCOUNTER
Patient called stating she had an Echo yesterday 11/16. Patient states she would like those results if they are available. Please call and advise.

## 2023-11-21 NOTE — TELEPHONE ENCOUNTER
Called pt. Notified her of Echo results per Dr. Marco A Georges. Pt verbalized understanding and confirmed she was taking carvedilol 12.5 BID.  Pt will also keep log of BP and HR to bring to her next appt

## 2023-12-13 ENCOUNTER — OFFICE VISIT (OUTPATIENT)
Dept: ORTHOPEDIC SURGERY | Age: 68
End: 2023-12-13
Payer: MEDICARE

## 2023-12-13 VITALS — WEIGHT: 230 LBS | BODY MASS INDEX: 42.33 KG/M2 | HEIGHT: 62 IN

## 2023-12-13 DIAGNOSIS — M54.16 LUMBAR RADICULOPATHY: Primary | ICD-10-CM

## 2023-12-13 PROCEDURE — 64483 NJX AA&/STRD TFRM EPI L/S 1: CPT | Performed by: PHYSICAL MEDICINE & REHABILITATION

## 2023-12-13 PROCEDURE — 64484 NJX AA&/STRD TFRM EPI L/S EA: CPT | Performed by: PHYSICAL MEDICINE & REHABILITATION

## 2023-12-13 RX ORDER — TRIAMCINOLONE ACETONIDE 40 MG/ML
160 INJECTION, SUSPENSION INTRA-ARTICULAR; INTRAMUSCULAR ONCE
Status: COMPLETED | OUTPATIENT
Start: 2023-12-13 | End: 2023-12-13

## 2023-12-13 RX ADMIN — TRIAMCINOLONE ACETONIDE 160 MG: 40 INJECTION, SUSPENSION INTRA-ARTICULAR; INTRAMUSCULAR at 14:22

## 2023-12-13 NOTE — PROGRESS NOTES
Date: 12/13/23   Name: Alan Hooper    Pre-Procedural Diagnosis:    Diagnosis Orders   1. Lumbar radiculopathy  triamcinolone acetonide (KENALOG-40) injection 160 mg    FL NERVE BLOCK LUMBOSACRAL 1ST    FL NERVE BLOCK LUMBOSACRAL EACH ADD          Procedure: Selective Nerve Root Blocks (Transforaminal) - Multiple Level    Precautions:  LBH Precautions spine injections: None. Patient denies any prior sensitivity to steroid, local anesthetic, contrast dye, iodine or shellfish. The procedure was discussed at length with the patient and informed consent was signed. The patient was placed in a prone position on the fluoroscopy table and the skin was prepped and draped in a routine sterile fashion. The areas to be injected were each anesthetized with approximately 5 cc of 1% Lidocaine. A 22-gauge 3.5 inch spinal needle was carefully advanced under fluoroscopic guidance to the left L5 transforaminal space and subsequently the left S1 transforaminal space. At this time 0.25 cc of omnipaque administered. . Once proper placement was confirmed, 2 cc of 0.25% Marcaine and 80 mg of Kenalog were injected through the spinal needle at each site. Fluoroscopic guidance was used intermittently over a 10-minute period to insure proper needle placement and patient safety. A hard copy of the fluoroscopic  images has been placed in the patient's chart. The patient was monitored after the procedure and discharged home in stable fashion. A total of 4 cc of Kenalog were administered during this procedure.     Resume Meds:  N/A    Devin Belcher MD  12/13/23

## 2023-12-15 ENCOUNTER — TELEPHONE (OUTPATIENT)
Dept: CARDIOLOGY CLINIC | Age: 68
End: 2023-12-15

## 2023-12-15 NOTE — TELEPHONE ENCOUNTER
Spoke with patient about abnormal nuke results. Patient f/u scheduled 12/21. Patient verbalized understanding.

## 2023-12-21 PROBLEM — R94.39 ABNORMAL NUCLEAR STRESS TEST: Status: ACTIVE | Noted: 2023-12-21

## 2023-12-28 DIAGNOSIS — R94.39 ABNORMAL NUCLEAR STRESS TEST: ICD-10-CM

## 2023-12-28 DIAGNOSIS — R07.89 CHEST TIGHTNESS: ICD-10-CM

## 2023-12-28 LAB
ANION GAP SERPL CALC-SCNC: 4 MMOL/L (ref 2–11)
BUN SERPL-MCNC: 24 MG/DL (ref 8–23)
CALCIUM SERPL-MCNC: 10.4 MG/DL (ref 8.3–10.4)
CHLORIDE SERPL-SCNC: 110 MMOL/L (ref 103–113)
CO2 SERPL-SCNC: 28 MMOL/L (ref 21–32)
CREAT SERPL-MCNC: 1 MG/DL (ref 0.6–1)
ERYTHROCYTE [DISTWIDTH] IN BLOOD BY AUTOMATED COUNT: 14.6 % (ref 11.9–14.6)
GLUCOSE SERPL-MCNC: 123 MG/DL (ref 65–100)
HCT VFR BLD AUTO: 45.6 % (ref 35.8–46.3)
HGB BLD-MCNC: 14.8 G/DL (ref 11.7–15.4)
MCH RBC QN AUTO: 30 PG (ref 26.1–32.9)
MCHC RBC AUTO-ENTMCNC: 32.5 G/DL (ref 31.4–35)
MCV RBC AUTO: 92.5 FL (ref 82–102)
NRBC # BLD: 0 K/UL (ref 0–0.2)
PLATELET # BLD AUTO: 341 K/UL (ref 150–450)
PMV BLD AUTO: 10.5 FL (ref 9.4–12.3)
POTASSIUM SERPL-SCNC: 4.1 MMOL/L (ref 3.5–5.1)
RBC # BLD AUTO: 4.93 M/UL (ref 4.05–5.2)
SODIUM SERPL-SCNC: 142 MMOL/L (ref 136–146)
WBC # BLD AUTO: 9.6 K/UL (ref 4.3–11.1)

## 2024-01-03 ENCOUNTER — HOSPITAL ENCOUNTER (OUTPATIENT)
Age: 69
Setting detail: OUTPATIENT SURGERY
Discharge: HOME | End: 2024-01-03
Attending: INTERNAL MEDICINE | Admitting: INTERNAL MEDICINE
Payer: MEDICARE

## 2024-01-03 VITALS
OXYGEN SATURATION: 95 % | RESPIRATION RATE: 12 BRPM | WEIGHT: 225 LBS | DIASTOLIC BLOOD PRESSURE: 48 MMHG | HEART RATE: 65 BPM | TEMPERATURE: 97.9 F | BODY MASS INDEX: 41.41 KG/M2 | HEIGHT: 62 IN | SYSTOLIC BLOOD PRESSURE: 120 MMHG

## 2024-01-03 DIAGNOSIS — R94.39 ABNORMAL NUCLEAR STRESS TEST: ICD-10-CM

## 2024-01-03 LAB
ECHO BSA: 2.11 M2
EKG ATRIAL RATE: 64 BPM
EKG DIAGNOSIS: NORMAL
EKG P AXIS: 52 DEGREES
EKG P-R INTERVAL: 196 MS
EKG Q-T INTERVAL: 408 MS
EKG QRS DURATION: 118 MS
EKG QTC CALCULATION (BAZETT): 420 MS
EKG R AXIS: 51 DEGREES
EKG T AXIS: 71 DEGREES
EKG VENTRICULAR RATE: 64 BPM

## 2024-01-03 PROCEDURE — 93010 ELECTROCARDIOGRAM REPORT: CPT | Performed by: INTERNAL MEDICINE

## 2024-01-03 PROCEDURE — 93005 ELECTROCARDIOGRAM TRACING: CPT | Performed by: INTERNAL MEDICINE

## 2024-01-03 PROCEDURE — 99152 MOD SED SAME PHYS/QHP 5/>YRS: CPT | Performed by: INTERNAL MEDICINE

## 2024-01-03 PROCEDURE — 6360000002 HC RX W HCPCS: Performed by: INTERNAL MEDICINE

## 2024-01-03 PROCEDURE — C1894 INTRO/SHEATH, NON-LASER: HCPCS | Performed by: INTERNAL MEDICINE

## 2024-01-03 PROCEDURE — 2500000003 HC RX 250 WO HCPCS: Performed by: INTERNAL MEDICINE

## 2024-01-03 PROCEDURE — C1769 GUIDE WIRE: HCPCS | Performed by: INTERNAL MEDICINE

## 2024-01-03 PROCEDURE — 2709999900 HC NON-CHARGEABLE SUPPLY: Performed by: INTERNAL MEDICINE

## 2024-01-03 PROCEDURE — 93458 L HRT ARTERY/VENTRICLE ANGIO: CPT | Performed by: INTERNAL MEDICINE

## 2024-01-03 PROCEDURE — 2580000003 HC RX 258: Performed by: INTERNAL MEDICINE

## 2024-01-03 PROCEDURE — 6360000004 HC RX CONTRAST MEDICATION: Performed by: INTERNAL MEDICINE

## 2024-01-03 RX ORDER — HEPARIN SODIUM 200 [USP'U]/100ML
INJECTION, SOLUTION INTRAVENOUS CONTINUOUS PRN
Status: DISCONTINUED | OUTPATIENT
Start: 2024-01-03 | End: 2024-01-03 | Stop reason: HOSPADM

## 2024-01-03 RX ORDER — SODIUM CHLORIDE 9 MG/ML
INJECTION, SOLUTION INTRAVENOUS CONTINUOUS
Status: DISCONTINUED | OUTPATIENT
Start: 2024-01-03 | End: 2024-01-03 | Stop reason: HOSPADM

## 2024-01-03 RX ORDER — MIDAZOLAM HYDROCHLORIDE 1 MG/ML
INJECTION INTRAMUSCULAR; INTRAVENOUS PRN
Status: DISCONTINUED | OUTPATIENT
Start: 2024-01-03 | End: 2024-01-03 | Stop reason: HOSPADM

## 2024-01-03 RX ORDER — ASPIRIN 81 MG/1
324 TABLET, CHEWABLE ORAL DAILY
Status: DISCONTINUED | OUTPATIENT
Start: 2024-01-03 | End: 2024-01-03 | Stop reason: HOSPADM

## 2024-01-03 RX ORDER — LIDOCAINE HYDROCHLORIDE 10 MG/ML
INJECTION, SOLUTION INFILTRATION; PERINEURAL PRN
Status: DISCONTINUED | OUTPATIENT
Start: 2024-01-03 | End: 2024-01-03 | Stop reason: HOSPADM

## 2024-01-03 RX ADMIN — SODIUM CHLORIDE: 9 INJECTION, SOLUTION INTRAVENOUS at 06:35

## 2024-01-03 NOTE — RESULT ENCOUNTER NOTE
Dear Ms. Luke,I reviewed the results of your heart catheterization which showed no evidence of any significant obstructive heart disease which is reassuring.  The pressure within your heart was on the higher side so we would like for you to follow a low-salt, low sugar diet.  Some mild weight loss can also help with these pressures.  Sincerely,  Jairo Gray MD

## 2024-01-03 NOTE — DISCHARGE INSTRUCTIONS
HEART CATHETERIZATION/ANGIOGRAPHY DISCHARGE INSTRUCTIONS    Check puncture site frequently for swelling or bleeding. If there is any bleeding, apply pressure over the area with a clean towel or washcloth and call 911. Notify your doctor for any redness, swelling, drainage, or oozing from the puncture site. Notify your doctor for any fever or chills.  If the extremity becomes cold, numb, or painful call Dr. Luke at [phone].  Activity should be limited for the next 48 hours. No heavy lifting, pushing, pulling  or strenuous activity for 48 hours. No heavy lifting (anything over 10 pounds) for 3 days.  You may resume your usual diet. Drink more fluids than usual.  Have a responsible person drive you home and stay with you for at least 24 hours after your heart catheterization/angiography.  You may remove bandage from your right arm in 24 hours. You may shower in 24 hours. No tub baths, hot tubs, or swimming for 1 week. Do not place any lotions, creams, powders, or ointments over puncture site for 1 week. You may place a clean band-aid over the puncture site each day for 5 days. Change daily.        Sedation for a Medical Procedure: Care Instructions     You were given a sedative medication during your visit. While many of the effects will have worn   off before you leave; you may continue to feel some effects for several hours.      Common side effects from sedation include:  Feeling sleepy. (Your doctors and nurses will make sure you are not too sleepy to go home.)  Nausea and vomiting. This usually does not last long.  Feeling tired.     How can you care for yourself at home?  Activity    Don't do anything for 24 hours that requires attention to detail. It takes time for the medicine effects to completely wear off.     Do not make important legal decisions for 24 hours.     Do not sign any legal documents for 24 hours.     Do not drink alcohol today     For your safety, you should not drive or operate heavy

## 2024-02-12 ENCOUNTER — OFFICE VISIT (OUTPATIENT)
Dept: ORTHOPEDIC SURGERY | Age: 69
End: 2024-02-12
Payer: MEDICARE

## 2024-02-12 DIAGNOSIS — M54.16 LUMBAR RADICULOPATHY: ICD-10-CM

## 2024-02-12 DIAGNOSIS — M48.062 SPINAL STENOSIS, LUMBAR REGION WITH NEUROGENIC CLAUDICATION: Primary | ICD-10-CM

## 2024-02-12 PROCEDURE — 99441 PR PHYS/QHP TELEPHONE EVALUATION 5-10 MIN: CPT | Performed by: NURSE PRACTITIONER

## 2024-02-12 NOTE — PROGRESS NOTES
Hearing reduced     Hyperlipemia     managed with medication    Hypertension     managed with medication    Iron deficiency anemia     hx    Menopausal disorder     Metabolic syndrome     Osteoporosis     Restrictive lung disease     PRN inhaler-last use 9/2022    Thickened endometrium      Tobacco:  reports that she has never smoked. She has never used smokeless tobacco.  Alcohol:   Social History     Substance and Sexual Activity   Alcohol Use No          Radiographic Studies:       Assessment/Plan:        ICD-10-CM    1. Spinal stenosis, lumbar region with neurogenic claudication  M48.062       2. Lumbar radiculopathy  M54.16            Patient does very well with injections.  She does not feel she is quite ready to repeat injection.  She normally goes 3 to 6 months.  She will call back when she is in need of reinjection.  She still continuing her diligent conservative treatment.  If she still continues have right-sided back pain we can try to adjust the injection accordingly.    -The patient will be treated observantly with self directed symptomatic care. Instructions were given to follow up if there is any neurologic or functional decline.    No orders of the defined types were placed in this encounter.       No orders of the defined types were placed in this encounter.           Return will call for injection.     FLY Murphy - CNP  02/12/24      Elements of this note were created using speech recognition software.  As such, errors of speech recognition may be present.

## 2024-02-14 ENCOUNTER — OFFICE VISIT (OUTPATIENT)
Age: 69
End: 2024-02-14
Payer: MEDICARE

## 2024-02-14 VITALS
WEIGHT: 220 LBS | DIASTOLIC BLOOD PRESSURE: 80 MMHG | HEART RATE: 78 BPM | SYSTOLIC BLOOD PRESSURE: 134 MMHG | HEIGHT: 62 IN | BODY MASS INDEX: 40.48 KG/M2

## 2024-02-14 DIAGNOSIS — I10 ESSENTIAL HYPERTENSION: ICD-10-CM

## 2024-02-14 DIAGNOSIS — R06.09 DYSPNEA ON EXERTION: Primary | ICD-10-CM

## 2024-02-14 DIAGNOSIS — R94.39 ABNORMAL NUCLEAR STRESS TEST: ICD-10-CM

## 2024-02-14 DIAGNOSIS — E78.2 MIXED HYPERLIPIDEMIA: ICD-10-CM

## 2024-02-14 DIAGNOSIS — R94.31 ABNORMAL EKG: ICD-10-CM

## 2024-02-14 PROCEDURE — 3079F DIAST BP 80-89 MM HG: CPT | Performed by: INTERNAL MEDICINE

## 2024-02-14 PROCEDURE — 3075F SYST BP GE 130 - 139MM HG: CPT | Performed by: INTERNAL MEDICINE

## 2024-02-14 PROCEDURE — 1123F ACP DISCUSS/DSCN MKR DOCD: CPT | Performed by: INTERNAL MEDICINE

## 2024-02-14 PROCEDURE — G8427 DOCREV CUR MEDS BY ELIG CLIN: HCPCS | Performed by: INTERNAL MEDICINE

## 2024-02-14 PROCEDURE — G8484 FLU IMMUNIZE NO ADMIN: HCPCS | Performed by: INTERNAL MEDICINE

## 2024-02-14 PROCEDURE — 99214 OFFICE O/P EST MOD 30 MIN: CPT | Performed by: INTERNAL MEDICINE

## 2024-02-14 PROCEDURE — 3017F COLORECTAL CA SCREEN DOC REV: CPT | Performed by: INTERNAL MEDICINE

## 2024-02-14 PROCEDURE — 1090F PRES/ABSN URINE INCON ASSESS: CPT | Performed by: INTERNAL MEDICINE

## 2024-02-14 PROCEDURE — 1036F TOBACCO NON-USER: CPT | Performed by: INTERNAL MEDICINE

## 2024-02-14 PROCEDURE — G8417 CALC BMI ABV UP PARAM F/U: HCPCS | Performed by: INTERNAL MEDICINE

## 2024-02-14 PROCEDURE — G8399 PT W/DXA RESULTS DOCUMENT: HCPCS | Performed by: INTERNAL MEDICINE

## 2024-02-14 RX ORDER — DAPAGLIFLOZIN 10 MG/1
10 TABLET, FILM COATED ORAL EVERY MORNING
COMMUNITY
Start: 2024-02-07

## 2024-02-14 NOTE — PROGRESS NOTES
[facility] CARDIOLOGY  [address] [place] [address], [address]  [place] [address]  PHONE: [phone]          24    NAME:  Anjana Luke  : 1955  MRN: [id number]         SUBJECTIVE:   Anjana Luke is a 68 y.o. female seen for a visit regarding the following:     Chief Complaint   Patient presents with    Hypertension           HPI:      Cardio problem list:  1.  Dyspnea on exertion/abnormal nuclear stress test  -Coronary angiography-1/3/2024-mild nonobstructive disease, mildly elevated EDP at 24, EF 55 to 60%  Echo from 2023-EF 55 to 60%, mild concentric LVH, mildly dilated left atrium  2023-Negative NST for ischemia-small fixed anterolateral/apical defect-, EF 52%  2.  Hypertension  3.  Hyperlipidemia  4.  Abnormal EKG  5.  Diabetes mellitus with neuropathy  6.  Obesity  7.  COPD      Dear Asha,  I saw Ms. Luke who is a pleasant 68-year-old woman in cardiovascular follow up for dyspnea on exertion/abnormal nuclear stress test with known underlying hypertension, hyperlipidemia with an abnormal EKG and underlying type 2 diabetes mellitus with neuropathy, obesity and COPD.    When we last met with her a few weeks ago, we set her up for coronary angiography which showed minor nonobstructive disease with mildly elevated EDP at 24 mmHg.    Dyspnea on exertion and chest tightness: Overall much better since coronary angiography and better control of pressures.  No anginal chest discomfort in any way.    Hypertension: Pressures are much better on her current therapy.  No significant lower extremity edema orthopnea PND.    Hyperlipidemia-remains on rosuvastatin therapy and denies any excessive myalgias in any way.    Type 2 diabetes mellitus-fairly new diagnosis for her.  Understands the need to follow a low-carb diet and a low sugar diet.  She is on Farxiga this will help with diabetes    She understands that her obesity can contribute to her dyspnea and is working on

## 2024-03-04 ENCOUNTER — TELEPHONE (OUTPATIENT)
Dept: ORTHOPEDIC SURGERY | Age: 69
End: 2024-03-04

## 2024-03-04 DIAGNOSIS — M54.16 LUMBAR RADICULOPATHY: Primary | ICD-10-CM

## 2024-03-04 DIAGNOSIS — M48.062 SPINAL STENOSIS OF LUMBAR REGION WITH NEUROGENIC CLAUDICATION: ICD-10-CM

## 2024-03-13 ENCOUNTER — TELEPHONE (OUTPATIENT)
Dept: ORTHOPEDIC SURGERY | Age: 69
End: 2024-03-13

## 2024-03-14 ENCOUNTER — TELEPHONE (OUTPATIENT)
Dept: ORTHOPEDIC SURGERY | Age: 69
End: 2024-03-14

## 2024-03-14 NOTE — TELEPHONE ENCOUNTER
Patient is calling to find out why her injection isn't approved by her insurance.  She states she called her insurance company and they say they haven't received anything from Dr. Browning.  She asks for a return call please.

## 2024-03-14 NOTE — TELEPHONE ENCOUNTER
Spoke with patient ., and injection for this pm is still pending informed her we would call by [phone] with status. We may have to cx if we do not have response by then.

## 2024-03-14 NOTE — TELEPHONE ENCOUNTER
Pt is calling about her inj.has it been approved.was pending. I tried to call emely   No answer.thanks

## 2024-03-18 ENCOUNTER — TELEPHONE (OUTPATIENT)
Dept: ORTHOPEDIC SURGERY | Age: 69
End: 2024-03-18

## 2024-03-18 NOTE — TELEPHONE ENCOUNTER
Spoke with patient informed her that we will reach out once we receive response from insurance company.

## 2024-04-02 ENCOUNTER — TELEPHONE (OUTPATIENT)
Dept: ORTHOPEDIC SURGERY | Age: 69
End: 2024-04-02

## 2024-04-02 DIAGNOSIS — M48.062 SPINAL STENOSIS OF LUMBAR REGION WITH NEUROGENIC CLAUDICATION: ICD-10-CM

## 2024-04-02 DIAGNOSIS — M54.16 LUMBAR RADICULOPATHY: Primary | ICD-10-CM

## 2024-04-08 ENCOUNTER — TELEPHONE (OUTPATIENT)
Dept: ORTHOPEDIC SURGERY | Age: 69
End: 2024-04-08

## 2024-04-09 ENCOUNTER — TELEPHONE (OUTPATIENT)
Dept: ORTHOPEDIC SURGERY | Age: 69
End: 2024-04-09

## 2024-04-09 NOTE — TELEPHONE ENCOUNTER
Patient called in and stated that she has a new medicare plan.  Her insurance has been updated in her registration to [employer] P[employer] and I informed her that she can go in [employer] and attach a picture of the front and back of the card if she would.  She stated understanding.  The phone number she gave me from the back of the card is [phone].  She states that she has called about an injection and the new insurance information is needed and has been added.  Thank you.

## 2024-04-09 NOTE — TELEPHONE ENCOUNTER
Offer to pay out of pocket otherwise there is nothing we can do as this was re-submitted last week

## 2024-04-17 ENCOUNTER — OFFICE VISIT (OUTPATIENT)
Dept: ORTHOPEDIC SURGERY | Age: 69
End: 2024-04-17
Payer: MEDICARE

## 2024-04-17 DIAGNOSIS — M54.16 LUMBAR RADICULOPATHY: Primary | ICD-10-CM

## 2024-04-17 PROCEDURE — 64483 NJX AA&/STRD TFRM EPI L/S 1: CPT | Performed by: PHYSICAL MEDICINE & REHABILITATION

## 2024-04-17 RX ORDER — TRIAMCINOLONE ACETONIDE 40 MG/ML
160 INJECTION, SUSPENSION INTRA-ARTICULAR; INTRAMUSCULAR ONCE
Status: COMPLETED | OUTPATIENT
Start: 2024-04-17 | End: 2024-04-17

## 2024-04-17 RX ADMIN — TRIAMCINOLONE ACETONIDE 160 MG: 40 INJECTION, SUSPENSION INTRA-ARTICULAR; INTRAMUSCULAR at 11:31

## 2024-04-17 NOTE — PROGRESS NOTES
Date: 04/17/24   Name: Anjana Luke    Pre-Procedural Diagnosis:    Diagnosis Orders   1. Lumbar radiculopathy  FL NERVE BLOCK LUMBOSACRAL 1ST    triamcinolone acetonide (KENALOG-40) injection 160 mg          Procedure: Bilateral Selective Nerve Root Blocks (Transforaminal) - Single Level    Precautions: H Precautions spine injections: None.  Patient denies any prior sensitivity to steroid, local anesthetic, contrast dye, iodine or shellfish.    The procedure was discussed at length with the patient and informed consent was signed. The patient was placed in a prone position on the fluoroscopy table and the skin was prepped and draped in a routine sterile fashion. The areas to be injected was/were anesthetized with approximately 5 cc of 1% Lidocaine. A 22-gauge 3.5 inch spinal needle was carefully advanced under fluoroscopic guidance to the bilateral L5 transforaminal spaces.  At this time 0.25 cc of omnipaque administered.  Once proper placement was confirmed, 2 cc of 0.25% Marcaine and 80 mg of Kenalog were injected through the spinal needle at each site.     Fluoroscopic guidance was used intermittently over a 10-minute period to insure proper needle placement and patient safety. A hard copy of the fluoroscopic  images has been placed in the patient's chart. The patient was monitored after the procedure and discharged home in stable fashion.     A total of 4 cc of Kenalog were administered during this procedure.    Resume Meds:   N/A    L DALLAS CASANOVA JR, MD  04/17/24

## 2024-04-18 DIAGNOSIS — R07.89 CHEST TIGHTNESS: ICD-10-CM

## 2024-04-18 RX ORDER — NITROGLYCERIN 0.4 MG/1
TABLET SUBLINGUAL
Qty: 75 TABLET | Refills: 3 | Status: SHIPPED | OUTPATIENT
Start: 2024-04-18

## 2024-05-01 ENCOUNTER — OFFICE VISIT (OUTPATIENT)
Age: 69
End: 2024-05-01
Payer: MEDICARE

## 2024-05-01 DIAGNOSIS — M48.061 STENOSIS OF LATERAL RECESS OF LUMBAR SPINE: Primary | ICD-10-CM

## 2024-05-01 DIAGNOSIS — M48.062 SPINAL STENOSIS OF LUMBAR REGION WITH NEUROGENIC CLAUDICATION: ICD-10-CM

## 2024-05-01 DIAGNOSIS — M41.27 OTHER IDIOPATHIC SCOLIOSIS, LUMBOSACRAL REGION: ICD-10-CM

## 2024-05-01 DIAGNOSIS — M47.816 LUMBAR FACET ARTHROPATHY: ICD-10-CM

## 2024-05-01 PROCEDURE — 1036F TOBACCO NON-USER: CPT | Performed by: NURSE PRACTITIONER

## 2024-05-01 PROCEDURE — 99213 OFFICE O/P EST LOW 20 MIN: CPT | Performed by: NURSE PRACTITIONER

## 2024-05-01 PROCEDURE — 1090F PRES/ABSN URINE INCON ASSESS: CPT | Performed by: NURSE PRACTITIONER

## 2024-05-01 PROCEDURE — G8428 CUR MEDS NOT DOCUMENT: HCPCS | Performed by: NURSE PRACTITIONER

## 2024-05-01 PROCEDURE — G8417 CALC BMI ABV UP PARAM F/U: HCPCS | Performed by: NURSE PRACTITIONER

## 2024-05-01 PROCEDURE — G8399 PT W/DXA RESULTS DOCUMENT: HCPCS | Performed by: NURSE PRACTITIONER

## 2024-05-01 PROCEDURE — 1123F ACP DISCUSS/DSCN MKR DOCD: CPT | Performed by: NURSE PRACTITIONER

## 2024-05-01 PROCEDURE — 3017F COLORECTAL CA SCREEN DOC REV: CPT | Performed by: NURSE PRACTITIONER

## 2024-05-01 NOTE — PROGRESS NOTES
reinjection    -The patient will be treated observantly with self directed symptomatic care. Instructions were given to follow up if there is any neurologic or functional decline.  - A home exercise program was prescribed for stretching and strengthening. A list of exercises was provided.     No orders of the defined types were placed in this encounter.       No orders of the defined types were placed in this encounter.       3 This is stable chronic illness/condition      Return if symptoms worsen or fail to improve.     FLY Murphy - CNP  05/01/24      Elements of this note were created using speech recognition software.  As such, errors of speech recognition may be present.

## 2024-05-21 LAB
ESTIMATED AVERAGE GLUCOSE: NORMAL
HBA1C MFR BLD: 7.3 %

## 2024-07-09 ENCOUNTER — TELEPHONE (OUTPATIENT)
Dept: ORTHOPEDIC SURGERY | Age: 69
End: 2024-07-09

## 2024-07-10 ENCOUNTER — TELEPHONE (OUTPATIENT)
Dept: ORTHOPEDIC SURGERY | Age: 69
End: 2024-07-10

## 2024-07-10 DIAGNOSIS — M48.061 STENOSIS OF LATERAL RECESS OF LUMBAR SPINE: ICD-10-CM

## 2024-07-10 DIAGNOSIS — M47.816 LUMBAR FACET ARTHROPATHY: Primary | ICD-10-CM

## 2024-07-10 DIAGNOSIS — M48.062 SPINAL STENOSIS OF LUMBAR REGION WITH NEUROGENIC CLAUDICATION: ICD-10-CM

## 2024-07-11 ENCOUNTER — TELEPHONE (OUTPATIENT)
Dept: ORTHOPEDIC SURGERY | Age: 69
End: 2024-07-11

## 2024-07-11 NOTE — TELEPHONE ENCOUNTER
Name: Anjana Luke  YOB: 1955  Gender: female  MRN: [id number]    The most recent injection was medications and without improvement.    The most recent injection provided 90% pain reduction for at least 3.5 months.    The patient's current pain scale is 8/10..    As a result of the current recurrence of pain, the patient is having the following difficulties:  Doing her daily activities everyday      The patient has severe pain limiting function despite on-going, conservative, physician-guided treatment.          Jacinta Keane MA     7/11/2024

## 2024-08-01 ENCOUNTER — OFFICE VISIT (OUTPATIENT)
Dept: ORTHOPEDIC SURGERY | Age: 69
End: 2024-08-01
Payer: MEDICARE

## 2024-08-01 DIAGNOSIS — M54.17 LUMBOSACRAL RADICULOPATHY: Primary | ICD-10-CM

## 2024-08-01 PROCEDURE — 64483 NJX AA&/STRD TFRM EPI L/S 1: CPT | Performed by: PHYSICAL MEDICINE & REHABILITATION

## 2024-08-01 RX ORDER — TRIAMCINOLONE ACETONIDE 40 MG/ML
160 INJECTION, SUSPENSION INTRA-ARTICULAR; INTRAMUSCULAR ONCE
Status: COMPLETED | OUTPATIENT
Start: 2024-08-01 | End: 2024-08-01

## 2024-08-01 RX ADMIN — TRIAMCINOLONE ACETONIDE 160 MG: 40 INJECTION, SUSPENSION INTRA-ARTICULAR; INTRAMUSCULAR at 16:20

## 2024-08-01 NOTE — PROGRESS NOTES
Marcaine and 80 mg of Kenalog were injected through the spinal needle at each site.     Fluoroscopic guidance was used intermittently over a 10-minute period to insure proper needle placement and patient safety. A hard copy of the fluoroscopic  images has been placed in the patient's chart. The patient was monitored after the procedure and discharged home in stable fashion.     A total of 4 cc of Kenalog were administered during this procedure.    Resume Meds:   N/A    VLADIMIR CASANOVA JR, MD  08/01/24

## 2024-08-22 ENCOUNTER — OFFICE VISIT (OUTPATIENT)
Age: 69
End: 2024-08-22
Payer: MEDICARE

## 2024-08-22 VITALS
HEART RATE: 76 BPM | HEIGHT: 64 IN | BODY MASS INDEX: 36.19 KG/M2 | SYSTOLIC BLOOD PRESSURE: 138 MMHG | DIASTOLIC BLOOD PRESSURE: 80 MMHG | WEIGHT: 212 LBS

## 2024-08-22 DIAGNOSIS — I10 ESSENTIAL HYPERTENSION: ICD-10-CM

## 2024-08-22 DIAGNOSIS — R94.31 ABNORMAL EKG: ICD-10-CM

## 2024-08-22 DIAGNOSIS — E78.2 MIXED HYPERLIPIDEMIA: ICD-10-CM

## 2024-08-22 DIAGNOSIS — R07.89 CHEST TIGHTNESS: Primary | ICD-10-CM

## 2024-08-22 DIAGNOSIS — R06.09 DYSPNEA ON EXERTION: ICD-10-CM

## 2024-08-22 PROCEDURE — 1036F TOBACCO NON-USER: CPT | Performed by: INTERNAL MEDICINE

## 2024-08-22 PROCEDURE — G8399 PT W/DXA RESULTS DOCUMENT: HCPCS | Performed by: INTERNAL MEDICINE

## 2024-08-22 PROCEDURE — G8428 CUR MEDS NOT DOCUMENT: HCPCS | Performed by: INTERNAL MEDICINE

## 2024-08-22 PROCEDURE — 99214 OFFICE O/P EST MOD 30 MIN: CPT | Performed by: INTERNAL MEDICINE

## 2024-08-22 PROCEDURE — 1090F PRES/ABSN URINE INCON ASSESS: CPT | Performed by: INTERNAL MEDICINE

## 2024-08-22 PROCEDURE — G8417 CALC BMI ABV UP PARAM F/U: HCPCS | Performed by: INTERNAL MEDICINE

## 2024-08-22 PROCEDURE — 3017F COLORECTAL CA SCREEN DOC REV: CPT | Performed by: INTERNAL MEDICINE

## 2024-08-22 PROCEDURE — 3079F DIAST BP 80-89 MM HG: CPT | Performed by: INTERNAL MEDICINE

## 2024-08-22 PROCEDURE — 3075F SYST BP GE 130 - 139MM HG: CPT | Performed by: INTERNAL MEDICINE

## 2024-08-22 PROCEDURE — 1123F ACP DISCUSS/DSCN MKR DOCD: CPT | Performed by: INTERNAL MEDICINE

## 2024-08-22 ASSESSMENT — ENCOUNTER SYMPTOMS
HOARSE VOICE: 0
HEMOPTYSIS: 0
HEMATOCHEZIA: 0
ABDOMINAL PAIN: 0
DOUBLE VISION: 0
STRIDOR: 0
EYE REDNESS: 0
WHEEZING: 0
HEMATEMESIS: 0

## 2024-08-22 NOTE — PROGRESS NOTES
normocephalic, atraumatic, pupils are equal bilaterally, throat appears to be clear  Neck: Large Neck, no significant jugular venous distention no cervical bruits  Cardiovascular: S1 and S2 heard, regular rate and rhythm, no significant murmurs rubs or gallops.   Respiratory: Clear to auscultation bilaterally with no adventitious sounds, respirations are normal  Abdomen: Soft, nontender, nondistended, bowel sounds present.  Extremities: No cyanosis clubbing or edema  Peripheral pulses: Bilateral radial artery pulses are palpated.  Bilateral pedal pulses are well felt.  Neuro: No facial droop and no gross focal motor deficits  Lymphatic: No significant cervical lymphadenopathy noted.  Musculoskeletal: No significant redness or swelling noted in all exposed joints.  Skin: No significant rashes noted the of the exposed regions.       Medical problems and test results were reviewed with the patient today.       No results found for: \"CHOL\", \"CHOLPOCT\", \"CHLST\", \"CHOLV\", \"HDL\", \"HDLPOC\", \"HDLC\", \"LDL\", \"VLDLC\", \"VLDL\",hemoglobin, basic metabolic panel, No results found for: \"TSH\", \"TSH2\", \"TSH3\" ,  Lab Results   Component Value Date/Time     12/28/2023 03:27 PM    K 4.1 12/28/2023 03:27 PM     12/28/2023 03:27 PM    CO2 28 12/28/2023 03:27 PM    BUN 24 12/28/2023 03:27 PM    GFRAA >60 08/23/2022 09:13 AM      No results found for: \"LDLDIRECT\"   Lab Results   Component Value Date    CREATININE 1.00 12/28/2023      Lab Results   Component Value Date    HGB 14.8 12/28/2023      Lab Results   Component Value Date     12/28/2023        Outside blood work from 10/10/2023 showed a total cholesterol 191, HDL 50, triglycerides 192, , glucose 179, BUN 16, creatinine 0.79, potassium 4.4, AST 28, ALT 32, albumin 4.4, hemoglobin 14.5, platelets 329-personally reviewed with her.    Blood work from 5/22/2024-hemoglobin 15.3, platelets 258, creatinine 1.02, glucose 124, BUN 17, potassium 4.6, albumin 4.0, A1c

## 2024-08-29 ENCOUNTER — OFFICE VISIT (OUTPATIENT)
Dept: ORTHOPEDIC SURGERY | Age: 69
End: 2024-08-29
Payer: MEDICARE

## 2024-08-29 DIAGNOSIS — M54.50 LUMBAR BACK PAIN: Primary | ICD-10-CM

## 2024-08-29 DIAGNOSIS — M48.061 SPINAL STENOSIS OF LUMBAR REGION WITHOUT NEUROGENIC CLAUDICATION: ICD-10-CM

## 2024-08-29 DIAGNOSIS — M79.10 MYALGIA, UNSPECIFIED SITE: ICD-10-CM

## 2024-08-29 DIAGNOSIS — M54.16 LUMBAR RADICULOPATHY: ICD-10-CM

## 2024-08-29 PROCEDURE — 1036F TOBACCO NON-USER: CPT | Performed by: PHYSICAL MEDICINE & REHABILITATION

## 2024-08-29 PROCEDURE — 99214 OFFICE O/P EST MOD 30 MIN: CPT | Performed by: PHYSICAL MEDICINE & REHABILITATION

## 2024-08-29 PROCEDURE — 3017F COLORECTAL CA SCREEN DOC REV: CPT | Performed by: PHYSICAL MEDICINE & REHABILITATION

## 2024-08-29 PROCEDURE — G8428 CUR MEDS NOT DOCUMENT: HCPCS | Performed by: PHYSICAL MEDICINE & REHABILITATION

## 2024-08-29 PROCEDURE — G8417 CALC BMI ABV UP PARAM F/U: HCPCS | Performed by: PHYSICAL MEDICINE & REHABILITATION

## 2024-08-29 PROCEDURE — G8399 PT W/DXA RESULTS DOCUMENT: HCPCS | Performed by: PHYSICAL MEDICINE & REHABILITATION

## 2024-08-29 PROCEDURE — 1090F PRES/ABSN URINE INCON ASSESS: CPT | Performed by: PHYSICAL MEDICINE & REHABILITATION

## 2024-08-29 PROCEDURE — G2211 COMPLEX E/M VISIT ADD ON: HCPCS | Performed by: PHYSICAL MEDICINE & REHABILITATION

## 2024-08-29 PROCEDURE — 1123F ACP DISCUSS/DSCN MKR DOCD: CPT | Performed by: PHYSICAL MEDICINE & REHABILITATION

## 2024-08-29 NOTE — PROGRESS NOTES
Date:  08/29/24     HPI:  I am seeing Anjana Luke in evalution/folowup.  I am seeing her at the request of Zhao BILL.  She has had difficulties for several years, no accident or injury.  Had several knee procedures for which the lumbar spine may have been aggravated during the rehabilitative process.  She complains of pain in the lower lumbar paraspinal areas may gravitate to the buttocks, little more so left than right.  Dull and sharp pain.  Worse with sitting, standing, bending.  Better with lying down or reclining.  Pain gravitates posteriorly and laterally to the knees, again more so left than right.  No weakness numbness or tingling with this.    Her pain scale is 8+/10 when she needs injections.  She has trouble walking, standing, getting into out of a car, doing things around the house, bathing, cleaning, dressing.  The pain is more noted affects her gait and she has to use a cane.  She has had no recent formal physical therapy.  From review of prior notes she has been attempting some lumbar spine exercises.  We will rediscuss those today and confirm she is on a good regimen.  She is on meloxicam, also gabapentin for some lower extremity complaints.  Tylenol.  Cyclobenzaprine produced AMS.  No spine surgical intervention.    She has had left L5 and S1 selective nerve root blocks in December as well as bilateral L5 selective nerve root blocks in April and August of this year.  These injections typically offered 90% pain reduction for almost 3 months.    She was evaluated by Zhao BILL is currently felt that spine surgical options are not required at this time.  She has had the appropriate MR imaging of your significant degenerative changes, central and neuroforaminal stenosis at several more levels.    Past Medical History:   Diagnosis Date    Allergic rhinitis     Anxiety     Arthritis     Autoimmune hepatitis (HCC)     Pt states diagnosis not accurate- was thought to be  tablets by mouth every 6 hours as needed (breakthrough pain)      linaclotide (LINZESS) 145 MCG capsule Take 1 capsule by mouth every morning (before breakfast)      albuterol sulfate  (90 Base) MCG/ACT inhaler Inhale 1 puff into the lungs every 4 hours as needed for Wheezing      amLODIPine (NORVASC) 10 MG tablet Take 1 tablet by mouth daily      DULoxetine (CYMBALTA) 60 MG extended release capsule Take 1 capsule by mouth 2 times daily      fluticasone (FLONASE) 50 MCG/ACT nasal spray 1 to 2 sprays each nostril daily      gabapentin (NEURONTIN) 100 MG capsule Take 2 capsules by mouth at bedtime.      hydroCHLOROthiazide (HYDRODIURIL) 25 MG tablet Take 1 tablet by mouth daily      potassium chloride (KLOR-CON) 10 MEQ extended release tablet Take 1 tablet by mouth 2 times daily      QUEtiapine (SEROQUEL) 25 MG tablet Take 2 tablets by mouth at bedtime      rosuvastatin (CRESTOR) 20 MG tablet Take 0.5 tablets by mouth at bedtime       Current Facility-Administered Medications   Medication Dose Route Frequency Provider Last Rate Last Admin    regadenoson (LEXISCAN) injection 0.4 mg  0.4 mg IntraVENous ONCE PRN Kareem Don MD            Allergies   Allergen Reactions    Benazepril Other (See Comments)    Cholestyramine Light      Other Reaction(s): Not available    Diphth-Acell Pertussis-Tetanus      Other Reaction(s): Not available    Erythromycin Other (See Comments)     Other reaction(s): Abdominal pain-I    GI upset    Other Reaction(s): Not available         PHYSICAL EXAM    General: Alert and cooperative    HEENT: Clear speech    Motor Exam: Good strength    Sensory Exam: No lateralizing findings    Deep Tendon Reflexes: Decreased reflexes in LE's    Cerebellar Exam: No ataxia in the Ue's    Extremities: Deferred    Gait / Station: Ambulates without assistance    Lumbar Spine: Good range of motion of the hips.  She has tenderness in lower lumbar paraspinal areas.  Somewhat situated by

## 2024-10-08 ENCOUNTER — TELEPHONE (OUTPATIENT)
Dept: ORTHOPEDIC SURGERY | Age: 69
End: 2024-10-08

## 2024-10-10 ENCOUNTER — OFFICE VISIT (OUTPATIENT)
Dept: ORTHOPEDIC SURGERY | Age: 69
End: 2024-10-10

## 2024-10-10 DIAGNOSIS — M54.50 LUMBAR BACK PAIN: ICD-10-CM

## 2024-10-10 DIAGNOSIS — M79.10 MYALGIA, UNSPECIFIED SITE: Primary | ICD-10-CM

## 2024-10-10 RX ORDER — TRIAMCINOLONE ACETONIDE 40 MG/ML
120 INJECTION, SUSPENSION INTRA-ARTICULAR; INTRAMUSCULAR ONCE
Status: COMPLETED | OUTPATIENT
Start: 2024-10-10 | End: 2024-10-10

## 2024-10-10 RX ORDER — TRIAMCINOLONE ACETONIDE 40 MG/ML
160 INJECTION, SUSPENSION INTRA-ARTICULAR; INTRAMUSCULAR ONCE
Status: SHIPPED | OUTPATIENT
Start: 2024-10-10

## 2024-10-10 RX ADMIN — TRIAMCINOLONE ACETONIDE 120 MG: 40 INJECTION, SUSPENSION INTRA-ARTICULAR; INTRAMUSCULAR at 16:01

## 2024-10-10 NOTE — PROGRESS NOTES
Date: 10/10/24   Name: Anjana Luke    Pre-Procedural Diagnosis:    Diagnosis Orders   1. Myalgia, unspecified site  triamcinolone acetonide (KENALOG-40) injection 160 mg    INJECT TRIGGER POINT, 1 OR 2    triamcinolone acetonide (KENALOG-40) injection 120 mg      2. Lumbar back pain  triamcinolone acetonide (KENALOG-40) injection 160 mg    INJECT TRIGGER POINT, 1 OR 2    triamcinolone acetonide (KENALOG-40) injection 120 mg          LUMBAR TRIGGER POINT INJECTION(S)      Trigger Point Injection(s):  After informed oral consent, patient was prepped per routine. The bilateral lumbar paraspinal area(s) were injected. 60 mg of Kenalog with 1 cc of 0.25% Marcaine were injected at each site. Patient tolerated well. Band aid(s) applied.     A total of two muscles/sites injected today for trigger point charge.    A total of 3 cc of Kenalog were administered during this procedure.    Patient will let us know if she needs fluoroscopically guided injections.  Patient's primary care physician is aware of the fact that she gets corticosteroid injections and this could affect her hemoglobin A1c levels.  It was requested that if any these injections are felt to be a medical detriment the patient, they are to get in touch with us to further discuss.    VLADIMIR CASANOVA JR, MD

## 2024-11-12 ENCOUNTER — TELEPHONE (OUTPATIENT)
Dept: ORTHOPEDIC SURGERY | Age: 69
End: 2024-11-12

## 2024-11-12 DIAGNOSIS — M54.16 LUMBAR RADICULOPATHY: Primary | ICD-10-CM

## 2024-11-12 NOTE — TELEPHONE ENCOUNTER
Called and scheduled patient for repeat injections with [facility]  11/22  10:30  GR

## 2024-11-12 NOTE — TELEPHONE ENCOUNTER
l    Name: Anjana Luke  YOB: 1955  Gender: female  MRN: [id number]      This patient has requested repeat SNRBS.    The most recent injection provided 80% pain reduction and improvement in functioning for at least 3 months.    The patient's current pain scale is 8/10..    As a result of the current recurrence of pain, the patient is having the following difficulties:  Difficulties with bathing and/or dressing  Difficulty sleeping at night  Difficulty transferring into or out of a car  Difficulty performing housework  Difficulty with sitting or performing work related activities    The patient has severe pain limiting function despite on-going, conservative, physician-guided treatment.    The patient is currently regularly engaging in physician or PT directed lumbar spine exercies.      Kiara Browning MD

## 2024-11-22 ENCOUNTER — OFFICE VISIT (OUTPATIENT)
Dept: ORTHOPEDIC SURGERY | Age: 69
End: 2024-11-22

## 2024-11-22 DIAGNOSIS — M54.16 LUMBAR RADICULOPATHY: Primary | ICD-10-CM

## 2024-11-22 RX ORDER — TRIAMCINOLONE ACETONIDE 40 MG/ML
160 INJECTION, SUSPENSION INTRA-ARTICULAR; INTRAMUSCULAR ONCE
Status: COMPLETED | OUTPATIENT
Start: 2024-11-22 | End: 2024-11-22

## 2024-11-22 RX ADMIN — TRIAMCINOLONE ACETONIDE 160 MG: 40 INJECTION, SUSPENSION INTRA-ARTICULAR; INTRAMUSCULAR at 10:39

## 2024-11-22 NOTE — PROGRESS NOTES
Date: 11/22/24   Name: Anjana Luke    Pre-Procedural Diagnosis:    Diagnosis Orders   1. Lumbar radiculopathy  FL NERVE BLOCK LUMBOSACRAL 1ST          Procedure: Bilateral Selective Nerve Root Blocks (Transforaminal) - Single Level    I have reviewed prior lumbar spine radiographs that reveal 5 non rib-bearing lumbar vertebrae. and I have personally reviewed with patient the informed consent for operation/procedure per [facility] protocol.  This involves risks and benefits of procedure, potential for success/improvement of injections,qualifications of physician performing procedure.  Consent form addressed appropriate local anesthesia, emergent blood transfusion, clarification of DNR status.  This form was signed by all appropriate parties and scanned into the medical record. Note that is not appropriate for me to discuss spine surgical issues or other treatment options if I am not the primary clinician.  If I am the ordering clinician, those issues would have been discussed at the appropriate office visit or at upcoming encounter.     Precautions: [facility] Precautions spine injections: None.  Patient denies any prior sensitivity to steroid, local anesthetic, contrast dye, iodine or shellfish.    The procedure was discussed at length with the patient and informed consent was signed. The patient was placed in a prone position on the fluoroscopy table and the skin was prepped and draped in a routine sterile fashion. The areas to be injected was/were anesthetized with approximately 5 cc of 1% Lidocaine. A 22-gauge 3.5 inch spinal needle was carefully advanced under fluoroscopic guidance to the bilateral L5 transforaminal spaces.  At this time 0.25 cc of omnipaque administered.  Once proper placement was confirmed, 2 cc of 0.25% Marcaine and 80 mg of Kenalog were injected through the spinal needle at each site.     Fluoroscopic guidance was used intermittently over a 10-minute period to insure proper needle

## 2025-01-16 ENCOUNTER — TELEPHONE (OUTPATIENT)
Dept: ORTHOPEDIC SURGERY | Age: 70
End: 2025-01-16

## 2025-02-06 ENCOUNTER — HOSPITAL ENCOUNTER (OUTPATIENT)
Dept: GENERAL RADIOLOGY | Age: 70
Discharge: HOME | End: 2025-02-09
Payer: MEDICARE

## 2025-02-06 DIAGNOSIS — M47.812 CERVICAL SPONDYLOSIS: ICD-10-CM

## 2025-02-06 DIAGNOSIS — M79.631 RIGHT FOREARM PAIN: ICD-10-CM

## 2025-02-06 PROCEDURE — 72040 X-RAY EXAM NECK SPINE 2-3 VW: CPT

## 2025-02-06 PROCEDURE — 73110 X-RAY EXAM OF WRIST: CPT

## 2025-02-06 PROCEDURE — 73090 X-RAY EXAM OF FOREARM: CPT

## 2025-02-11 ENCOUNTER — TRANSCRIBE ORDERS (OUTPATIENT)
Dept: SCHEDULING | Age: 70
End: 2025-02-11

## 2025-02-11 ENCOUNTER — OFFICE VISIT (OUTPATIENT)
Dept: ORTHOPEDIC SURGERY | Age: 70
End: 2025-02-11
Payer: MEDICARE

## 2025-02-11 DIAGNOSIS — M54.16 LUMBAR RADICULOPATHY: Primary | ICD-10-CM

## 2025-02-11 DIAGNOSIS — R20.9 DISTURBANCE OF SKIN SENSATION: ICD-10-CM

## 2025-02-11 DIAGNOSIS — M47.22 OTHER SPONDYLOSIS WITH RADICULOPATHY, CERVICAL REGION: Primary | ICD-10-CM

## 2025-02-11 DIAGNOSIS — M48.061 SPINAL STENOSIS OF LUMBAR REGION WITHOUT NEUROGENIC CLAUDICATION: ICD-10-CM

## 2025-02-11 PROCEDURE — G8417 CALC BMI ABV UP PARAM F/U: HCPCS | Performed by: PHYSICAL MEDICINE & REHABILITATION

## 2025-02-11 PROCEDURE — G8399 PT W/DXA RESULTS DOCUMENT: HCPCS | Performed by: PHYSICAL MEDICINE & REHABILITATION

## 2025-02-11 PROCEDURE — 1036F TOBACCO NON-USER: CPT | Performed by: PHYSICAL MEDICINE & REHABILITATION

## 2025-02-11 PROCEDURE — 99214 OFFICE O/P EST MOD 30 MIN: CPT | Performed by: PHYSICAL MEDICINE & REHABILITATION

## 2025-02-11 PROCEDURE — 1090F PRES/ABSN URINE INCON ASSESS: CPT | Performed by: PHYSICAL MEDICINE & REHABILITATION

## 2025-02-11 PROCEDURE — 3017F COLORECTAL CA SCREEN DOC REV: CPT | Performed by: PHYSICAL MEDICINE & REHABILITATION

## 2025-02-11 PROCEDURE — G2211 COMPLEX E/M VISIT ADD ON: HCPCS | Performed by: PHYSICAL MEDICINE & REHABILITATION

## 2025-02-11 PROCEDURE — 1123F ACP DISCUSS/DSCN MKR DOCD: CPT | Performed by: PHYSICAL MEDICINE & REHABILITATION

## 2025-02-11 PROCEDURE — G8428 CUR MEDS NOT DOCUMENT: HCPCS | Performed by: PHYSICAL MEDICINE & REHABILITATION

## 2025-02-11 NOTE — PROGRESS NOTES
Date:  02/11/25  HPI:  I am seeing Anjana Luke in evalution/folowup.  Office visit 30 minutes for full discussion of symptomatology, brief discussion of new issues and also extra time spent to provide paperwork for insurance compliance with injections, further interventions.  I saw her most recently in August.  She has pain in the lower lumbar paraspinal areas but is a gravitates laterally more so than posteriorly to around the knees.  This is in an L5 dermatomal distribution.  Worse with activities and better with rest.    She typically has 80+ percent pain reduction from bilateral L5 selective nerve root blocks, this usually over 2 months.  Last set of injections were performed 2 and half months ago the pain is starting to come back in a similar fashion.    Current pain scale is 8/10.  This causes trouble walking, standing, getting into out of a car, doing things around the house, bathing, cleaning, dressing.  She has had no recent formal physical therapy but has been doing lumbar spine exercises, provider directed.  She was evaluated by Zhao Webber APRN is currently felt that spine surgical options not required, though MR imaging lumbar spine around 2 years ago revealed significant stenosis centrally as well as neuroforaminally.    She also has had some upper extremity complaints either numbness or trouble with function in the hands.  She has been evaluated by neurologist, is had electrophysiologic testing.  Sounds like she is scheduled for an MRI of the cervical spine.    ROS: As above    PE: Alert and cooperative.  Good strength lower extremities.  No lateralizing findings.  Good range of motion of the hips.  Depressed reflexes lower extremities she has tenderness in the upper buttocks bilaterally.  Straight leg raising exacerbates her symptoms.  I did not examine her upper extremities today.    Assessment/Plan:       PREDOMINANTLY MUSCULOSKELETAL LUMBOSACRAL  SPINE PAIN.  Bilateral L5

## 2025-02-19 ENCOUNTER — HOSPITAL ENCOUNTER (OUTPATIENT)
Age: 70
Discharge: HOME | End: 2025-02-21
Payer: MEDICARE

## 2025-02-19 ENCOUNTER — CLINICAL DOCUMENTATION (OUTPATIENT)
Dept: ORTHOPEDIC SURGERY | Age: 70
End: 2025-02-19

## 2025-02-19 DIAGNOSIS — M47.22 OTHER SPONDYLOSIS WITH RADICULOPATHY, CERVICAL REGION: ICD-10-CM

## 2025-02-19 PROCEDURE — 72141 MRI NECK SPINE W/O DYE: CPT

## 2025-02-19 NOTE — PROGRESS NOTES
Updated clinicals on patient:    Please refer to my office note from 2-.  She received bilateral L5 selective nerve root blocks on 11- these injections offer 80+ percent pain reduction for over 2 months.  She had a similarly nice response last set of injections from November and the pain is returning.  She still has benefit from the last injection; however, the pain has returned to the point where we need to reschedule repeat injections.  Her current pain scale may get up to 7/10.  The pain affects her quality of life as noted in the last office note from earlier this month.  She continues with the provider directed lumbar spine exercises.  Again the injections order would be bilateral L5 selective nerve root blocks.  On 2- she will be 3 months out from last set of injections, per insurance provider requirement, and I would like to order those injections after that date.  There is nothing going on clinically with the patient that would suggest that we need to wait longer than that to get them scheduled.

## 2025-02-26 ENCOUNTER — TELEPHONE (OUTPATIENT)
Dept: ORTHOPEDIC SURGERY | Age: 70
End: 2025-02-26

## 2025-02-27 ENCOUNTER — CLINICAL DOCUMENTATION (OUTPATIENT)
Dept: ORTHOPEDIC SURGERY | Age: 70
End: 2025-02-27

## 2025-02-27 NOTE — PROGRESS NOTES
Discussed situation with patient.  I am requesting repeat bilateral L5 transforaminal epidural steroid injections.  Full documentation is in the note from February 11.  She tells me that with regards to the injection that was performed 3 months ago, she still has 50% pain reduction.

## 2025-03-03 ENCOUNTER — OFFICE VISIT (OUTPATIENT)
Dept: ORTHOPEDIC SURGERY | Age: 70
End: 2025-03-03
Payer: MEDICARE

## 2025-03-03 DIAGNOSIS — M54.16 LUMBAR RADICULOPATHY: Primary | ICD-10-CM

## 2025-03-03 PROCEDURE — 64483 NJX AA&/STRD TFRM EPI L/S 1: CPT | Performed by: PHYSICAL MEDICINE & REHABILITATION

## 2025-03-03 RX ORDER — TRIAMCINOLONE ACETONIDE 40 MG/ML
40 INJECTION, SUSPENSION INTRA-ARTICULAR; INTRAMUSCULAR ONCE
Status: COMPLETED | OUTPATIENT
Start: 2025-03-03 | End: 2025-03-03

## 2025-03-03 RX ADMIN — TRIAMCINOLONE ACETONIDE 40 MG: 40 INJECTION, SUSPENSION INTRA-ARTICULAR; INTRAMUSCULAR at 14:42

## 2025-03-03 NOTE — PROGRESS NOTES
placement and patient safety. A hard copy of the fluoroscopic  images has been placed in the patient's chart. The patient was monitored after the procedure and discharged home in stable fashion.     A total of 4 cc of Kenalog were administered during this procedure.    Resume Meds:   N/A    VLADIMIR CASANOVA JR, MD  03/03/25

## 2025-04-03 ENCOUNTER — HOSPITAL ENCOUNTER (OUTPATIENT)
Dept: PHYSICAL THERAPY | Age: 70
Setting detail: RECURRING SERIES
Discharge: HOME | End: 2025-04-06
Payer: MEDICARE

## 2025-04-03 DIAGNOSIS — M62.81 MUSCLE WEAKNESS (GENERALIZED): Primary | ICD-10-CM

## 2025-04-03 DIAGNOSIS — M53.9 CERVICAL DYSFUNCTION: ICD-10-CM

## 2025-04-03 DIAGNOSIS — M53.82 DECREASED ROM OF INTERVERTEBRAL DISCS OF CERVICAL SPINE: ICD-10-CM

## 2025-04-03 PROCEDURE — 97140 MANUAL THERAPY 1/> REGIONS: CPT

## 2025-04-03 PROCEDURE — 97161 PT EVAL LOW COMPLEX 20 MIN: CPT

## 2025-04-03 PROCEDURE — 97110 THERAPEUTIC EXERCISES: CPT

## 2025-04-03 ASSESSMENT — PAIN SCALES - GENERAL: PAINLEVEL_OUTOF10: 6

## 2025-04-03 NOTE — PROGRESS NOTES
visual, verbal, manual, and tactile cues to promote proper body alignment, promote proper body posture, promote proper body mechanics, and promote proper body breathing techniques.  Progressed resistance, range, repetitions, and complexity of movement as indicated.   Date:  4/3/2025 Date:   Date:     Activity/Exercise Parameters Parameters Parameters   Cervical AROM Seated Head Supported Flexion/Extension 3 x 10     Seated Head Supported Rotation 3 x 10     Thoracic Mobilization Chair with Towel Roll 3 x 10                                   emids Access Code: [email or web address]    Time spent with patient reviewing proper muscle recruitment and technique with exercises.     MANUAL THERAPY: (23 minutes):   Joint mobilization and Soft tissue mobilization was utilized and necessary because of the patient's restricted joint motion, painful spasm, loss of articular motion, and restricted motion of soft tissue.   Manual Cervical Traction  Soft Tissue Mobilization Upper Trapezius, Levator Scalenes.    MODALITIES: (0 minutes):        None Today      HEP: As above; handouts given to patient for all exercises.     Treatment/Session Summary:    Treatment Assessment:   Patient  strength improved from 12 kgs right and left to 18 kgs right and 16 kgs left with manual interventions. Patient instructed regarding HEP  Communication/Consultation:   HEP  Equipment provided today:  HEP  Recommendations/Intent for next treatment session: Next visit will focus on decreasing pain and heaviness and improving mobility, flexibility, ROM, strength, and function.    >Total Treatment Billable Duration:  58 minutes (20 minutes evaluation, 23 minutes manual, 15 minutes therapeutic exercise)  Time In: 0846  Time Out: 0956     Matt Kovacs PT         Charge Capture  Events  [employer] Portal  Appt Desk  Attendance Report     No future appointments.

## 2025-04-03 NOTE — THERAPY EVALUATION
Anjana Luke  : 1955  Primary: [employer] (Medicare Managed)  Secondary:  [facility]  [address] [facility] [address]  Phone: [phone]  Fax: [phone] Plan Frequency: 2 times per week for 60 days    Plan of Care/Certification Expiration Date: 25 (Plan of Care Start Date )        Plan of Care/Certification Expiration Date:  Plan of Care/Certification Expiration Date: 25 (Plan of Care Start Date )    Frequency/Duration: Plan Frequency: 2 times per week for 60 days      Time In/Out:   Time In: 846  Time Out: 956      PT Visit Info:    Progress Note Counter: 1      Visit Count:  1                OUTPATIENT PHYSICAL THERAPY:             Initial Assessment 4/3/2025               Episode (Weakness, Cervical Dysfunction )         Treatment Diagnosis:     Muscle weakness (generalized)  Cervical dysfunction  Decreased ROM of intervertebral discs of cervical spine  Medical/Referring Diagnosis:    Weakness    Referring Physician:  Rigo Muñoz MD MD Orders:  PT Eval and Treat   Return MD Appt:  2025  Date of Onset:  Onset Date: 24     Allergies:  Benazepril, Cholestyramine light, Diphth-acell pertussis-tetanus, and Erythromycin  Restrictions/Precautions:    Past Medical History that includes  Diabetes, High Blood Pressure, Fracture, Numbness, Osteoporosis, Left TKA, Right TKA, Right Shoulder Fracture.      Medications Last Reviewed: 4/3/2025     SUBJECTIVE   History of Injury/Illness (Reason for Referral):  Ms. Luke is a 69 y.o. female presenting to physical therapy with complaints of right wrist pain/swelling, left hand pain, bilateral hand and upper extremity weakness. She reports also having a heaviness in both hands, right greater then left. She reports trouble  things and holding on to them. She reports trouble twisting, for example a door knob, pulling, wrapping, a

## 2025-04-07 ENCOUNTER — APPOINTMENT (OUTPATIENT)
Dept: PHYSICAL THERAPY | Age: 70
End: 2025-04-07
Payer: MEDICARE

## 2025-04-16 ENCOUNTER — HOSPITAL ENCOUNTER (OUTPATIENT)
Dept: PHYSICAL THERAPY | Age: 70
Setting detail: RECURRING SERIES
Discharge: HOME | End: 2025-04-19
Payer: MEDICARE

## 2025-04-16 DIAGNOSIS — M62.81 MUSCLE WEAKNESS (GENERALIZED): Primary | ICD-10-CM

## 2025-04-16 DIAGNOSIS — M53.9 CERVICAL DYSFUNCTION: ICD-10-CM

## 2025-04-16 DIAGNOSIS — M53.82 DECREASED ROM OF INTERVERTEBRAL DISCS OF CERVICAL SPINE: ICD-10-CM

## 2025-04-16 PROCEDURE — 97110 THERAPEUTIC EXERCISES: CPT

## 2025-04-16 PROCEDURE — 97140 MANUAL THERAPY 1/> REGIONS: CPT

## 2025-04-16 ASSESSMENT — PAIN SCALES - GENERAL: PAINLEVEL_OUTOF10: 6

## 2025-04-16 NOTE — PROGRESS NOTES
Anjana Luke  : 1955  Primary: [employer] (Medicare Managed)  Secondary:  [facility]  [address]  [facility] [address]  Phone: [phone]  Fax: [phone] Plan Frequency: 2 times per week for 60 days  Plan of Care/Certification Expiration Date: 25 (Plan of Care Start Date )        Plan of Care/Certification Expiration Date:  Plan of Care/Certification Expiration Date: 25 (Plan of Care Start Date )    Frequency/Duration: Plan Frequency: 2 times per week for 60 days      Time In/Out:   Time In: 657  Time Out: 758      PT Visit Info:    Progress Note Counter: 2      Visit Count:  2    OUTPATIENT PHYSICAL THERAPY:   Treatment Note 2025       Episode  (Weakness, Cervical Dysfunction )               Treatment Diagnosis:    Muscle weakness (generalized)  Cervical dysfunction  Decreased ROM of intervertebral discs of cervical spine  Medical/Referring Diagnosis:    Weakness    Referring Physician:  Rigo Muñoz MD MD Orders:  PT Eval and Treat   Return MD Appt:  2025   Date of Onset:  Onset Date: 24     Allergies:   Benazepril, Cholestyramine light, Diphth-acell pertussis-tetanus, and Erythromycin  Restrictions/Precautions:   Past Medical History that includes  Diabetes, High Blood Pressure, Fracture, Numbness, Osteoporosis, Left TKA, Right TKA, Right Shoulder Fracture.      Interventions Planned (Treatment may consist of any combination of the following):     See Assessment Note    Subjective Comments:   Patient reports some pain in her arms and a little bit of neck pain. She reports she has recovered from recent illness. She reports also has some heaviness in her arms and pain in a couple of fingers.  Initial Pain Level:     6/10  Post Session Pain Level:      0/10  Medications Last Reviewed: 2025  Updated Objective Findings:   Palpation: Bilateral Upper Trapezius, Levator

## 2025-04-23 ENCOUNTER — APPOINTMENT (OUTPATIENT)
Dept: PHYSICAL THERAPY | Age: 70
End: 2025-04-23
Payer: MEDICARE

## 2025-04-29 ENCOUNTER — APPOINTMENT (OUTPATIENT)
Dept: PHYSICAL THERAPY | Age: 70
End: 2025-04-29
Payer: MEDICARE

## 2025-06-17 ENCOUNTER — HOSPITAL ENCOUNTER (OUTPATIENT)
Dept: GENERAL RADIOLOGY | Age: 70
Discharge: HOME | End: 2025-06-19
Payer: MEDICARE

## 2025-06-17 DIAGNOSIS — R52 PAIN: ICD-10-CM

## 2025-06-17 PROCEDURE — 73030 X-RAY EXAM OF SHOULDER: CPT

## 2025-06-30 ENCOUNTER — TELEPHONE (OUTPATIENT)
Dept: ORTHOPEDIC SURGERY | Age: 70
End: 2025-06-30

## 2025-06-30 DIAGNOSIS — M54.16 LUMBAR RADICULOPATHY: Primary | ICD-10-CM

## 2025-06-30 NOTE — TELEPHONE ENCOUNTER
l    Name: Anjana Luke  YOB: 1955  Gender: female  MRN: [id number]      This patient has requested repeat selective nerve root block.    The most recent injection provided 80% pain reduction and improvement in functioning for at least 4 months.    The patient's current pain scale is 8/10..    As a result of the current recurrence of pain, the patient is having the following difficulties:  Difficulties with bathing and/or dressing  Difficulty sleeping at night  Difficulty transferring into or out of a car  Difficulty performing housework  Difficulty with sitting or performing work related activities    The patient has severe pain limiting function despite on-going, conservative, physician-guided treatment.    The patient is currently regularly engaging in physician or PT directed lumbar spine exercies.      Kiara Browning MD

## 2025-06-30 NOTE — TELEPHONE ENCOUNTER
[facility] Clinical Staff  Specialty Message to Provider    Relationship to Patient: Self    Patient Message  pt  ask for Marielle to  call her   she  didn't  know  whether it is time to schedule an injection or not   or if he wanted to see her  first ....  --------------------------------------------------------------------------------------------------------------------------    Call Back Information: OK to leave message on voicemail  Preferred Call Back Number: Phone  [phone]

## 2025-07-08 ENCOUNTER — CLINICAL DOCUMENTATION (OUTPATIENT)
Dept: ORTHOPEDIC SURGERY | Age: 70
End: 2025-07-08

## 2025-07-08 NOTE — PROGRESS NOTES
The following is updated clinical information to facilitate approval for spine injections.  The injections requested are bilateral L5 selective nerve root blocks.    Please note the following narrative addresses the additional information required by the insurance carrier in order to facilitate approval for these injections.  Also included other appropriate information to be complete.    These injections (bilateral L5 injections) were most recently performed on 3-3-2025 and offered 80+ percent pain reduction for over 3 months.  The patient tells me that about 3 months out from the injection she still was 50% improved.  The pain has come back in a similar fashion.  Her pain scale is 8/10.  When the pain is more noted this produces trouble with walking, standing, getting into or out of a car, household duties, bathing, cleaning, dressing.  She has attempted lumbar spine exercises but does have some difficulty with them.  As result, do not believe that physical therapy is a meaningful treatment option at this time.  She has not responded to anti-inflammatory medications.  She is on gabapentin for a nonspine related condition and she does not feel it has any benefit for her lumbar spine pain.    Her primary care physician has been notified of ongoing corticosteroid injections and this was documented in my office note from 2-.    The patient is receiving ongoing injection therapy because it has been efficacious and also states she does not want to have spine surgery except as a last resort.  Note that within the last year she has been evaluated by the spine surgical department at [facility] and was not felt that spine surgery was indicated at that time.

## 2025-07-14 ENCOUNTER — OFFICE VISIT (OUTPATIENT)
Dept: ORTHOPEDIC SURGERY | Age: 70
End: 2025-07-14
Payer: MEDICARE

## 2025-07-14 DIAGNOSIS — M54.16 LUMBAR RADICULOPATHY: Primary | ICD-10-CM

## 2025-07-14 PROCEDURE — 64483 NJX AA&/STRD TFRM EPI L/S 1: CPT | Performed by: PHYSICAL MEDICINE & REHABILITATION

## 2025-07-14 RX ORDER — TRIAMCINOLONE ACETONIDE 40 MG/ML
40 INJECTION, SUSPENSION INTRA-ARTICULAR; INTRAMUSCULAR ONCE
Status: COMPLETED | OUTPATIENT
Start: 2025-07-14 | End: 2025-07-14

## 2025-07-14 RX ADMIN — TRIAMCINOLONE ACETONIDE 40 MG: 40 INJECTION, SUSPENSION INTRA-ARTICULAR; INTRAMUSCULAR at 14:19

## 2025-07-14 NOTE — PROGRESS NOTES
Date: 07/14/25   Name: Anjana Luke    Pre-Procedural Diagnosis:    Diagnosis Orders   1. Lumbar radiculopathy  FL NERVE BLOCK LUMBOSACRAL 1ST    triamcinolone acetonide (KENALOG-40) injection 40 mg          Procedure: Bilateral Selective Nerve Root Blocks (Transforaminal) - Single Level    I have reviewed prior lumbar spine radiographs that reveal 5 non rib-bearing lumbar vertebrae. and I have personally reviewed with patient the informed consent for operation/procedure per [facility] protocol.  This involves risks and benefits of procedure, potential for success/improvement of injections,qualifications of physician performing procedure.  Consent form addressed appropriate local anesthesia.  This form was signed by all appropriate parties and scanned into the medical record. Note that is not appropriate for me to discuss spine surgical issues or other treatment options if I am not the primary clinician.  If I am the ordering clinician, those issues would have been discussed at the appropriate office visit or at upcoming encounter.     Precautions: [facility] Precautions spine injections: None.  Patient denies any prior sensitivity to steroid, local anesthetic, contrast dye, iodine or shellfish.    The procedure was discussed at length with the patient and informed consent was signed. The patient was placed in a prone position on the fluoroscopy table and the skin was prepped and draped in a routine sterile fashion. The areas to be injected was/were anesthetized with approximately 5 cc of 1% Lidocaine. A 22-gauge 3.5 inch spinal needle was carefully advanced under fluoroscopic guidance to the bilateral L5 transforaminal spaces.  At this time 0.25 cc of omnipaque administered.  Once proper placement was confirmed, 2 cc of 0.25% Marcaine and 80 mg of Kenalog were injected through the spinal needle at each site.     Fluoroscopic guidance was used intermittently over a 10-minute period to insure proper needle

## (undated) DEVICE — TOTAL KNEE DR RIDGEWAY: Brand: MEDLINE INDUSTRIES, INC.

## (undated) DEVICE — STERILE PVP: Brand: MEDLINE INDUSTRIES, INC.

## (undated) DEVICE — Device

## (undated) DEVICE — KIT DRP FOR RIO ROBOTIC ARM ASST SYS

## (undated) DEVICE — SUTURE VCRL SZ 1 L27IN ABSRB UD L36MM CP-1 1/2 CIR REV CUT J268H

## (undated) DEVICE — BLADE SAW RECIPROCAING 67.9X12.5X1X1.2MM 2 SIDE STRYKR NS

## (undated) DEVICE — PIN DRL 4X125 MM ROBOTIC-ASSISTED SOLUTION ARRY VELYS

## (undated) DEVICE — SYRINGE MED 50ML LUERLOCK TIP

## (undated) DEVICE — PIN BNE FIX TEMP L110MM DIA4MM MAKO

## (undated) DEVICE — DRAPE EQUIP SATELLITE STN STRL VELYS

## (undated) DEVICE — BIPOLAR SEALER 23-112-1 AQM 6.0: Brand: AQUAMANTYS ®

## (undated) DEVICE — RADIFOCUS OPTITORQUE ANGIOGRAPHIC CATHETER: Brand: OPTITORQUE

## (undated) DEVICE — SUTURE STRATAFIX SPRL SZ 1 L14IN ABSRB VLT L48CM CTX 1/2 SXPD2B405

## (undated) DEVICE — 450 ML BOTTLE OF 0.05% CHLORHEXIDINE GLUCONATE IN 99.95% STERILE WATER FOR IRRIGATION, USP AND APPLICATOR.: Brand: IRRISEPT ANTIMICROBIAL WOUND LAVAGE

## (undated) DEVICE — SOLUTION IRRIG 1000ML 0.9% SOD CHL USP POUR PLAS BTL

## (undated) DEVICE — DRESSING SHIELD SURG

## (undated) DEVICE — SOLUTION IRRIG 3000ML 0.9% SOD CHL USP UROMATIC PLAS CONT

## (undated) DEVICE — PIN BNE FIX TEMP L140MM DIA4MM MAKO

## (undated) DEVICE — DRAPE EQUIP ROBOT STRL VELYS 20/PK ORDER IN MULTIIPLES OF 20 EACH

## (undated) DEVICE — KIT TRK KNEE PROC VIZADISC

## (undated) DEVICE — PIN DRL 4X175 MM ROBOTIC-ASSISTED SOLUTION ARRY VELYS

## (undated) DEVICE — CLOSURE SKIN FACILITATES COMPATIBILITY W/ CERTAIN IS DSG

## (undated) DEVICE — CLOSURE SKIN FLX NONINVASIVE PRELOC TECHNOLOGY FOR 24IN

## (undated) DEVICE — YANKAUER,FLEXIBLE HANDLE,REGLR CAPACITY: Brand: MEDLINE INDUSTRIES, INC.

## (undated) DEVICE — PIN DISPNS IV ADD FOR RUB STOPPERED MD VI REUSE MINI-SPIKE

## (undated) DEVICE — BLADE SURG SAW STD S STL OSC W/ SERR EDGE DISP

## (undated) DEVICE — GUIDEWIRE 035IN 210CM PTFE COAT FIX COR J TIP 15MM FIRM BODY

## (undated) DEVICE — DUAL CUT SAGITTAL BLADE

## (undated) DEVICE — KIT CHECKPOINT MARKER SURGICAL TIBIAL KNEE STERILE LATEX IMPLANTABLE DISPOSABLE MAKO ROBOT

## (undated) DEVICE — RECIPROCATING BLADE DOUBLE SIDE (74.0 X 0.77MM)

## (undated) DEVICE — GLIDESHEATH SLENDER STAINLESS STEEL KIT: Brand: GLIDESHEATH SLENDER

## (undated) DEVICE — SOLUTION IV 250ML 0.9% SOD CHL PH 5 INJ USP VIAFLX PLAS

## (undated) DEVICE — SUTURE VCRL SZ 2-0 L27IN ABSRB UD L36MM CP-1 1/2 CIR REV J266H

## (undated) DEVICE — BAND COMPR L24CM REG CLR PLAS HEMSTAT EXT HK AND LOOP RETEN

## (undated) DEVICE — CATHETER DIAG AD 5FR L110CM 145DEG COR GRY HYDRPHLC NYL

## (undated) DEVICE — STERILE PRESSURE PROTECTOR PAD® FOR DE MAYO UNIVERSAL DISTRACTOR® (10/CASE): Brand: DE MAYO UNIVERSAL DISTRACTOR®